# Patient Record
Sex: FEMALE | Race: WHITE | Employment: UNEMPLOYED | URBAN - METROPOLITAN AREA
[De-identification: names, ages, dates, MRNs, and addresses within clinical notes are randomized per-mention and may not be internally consistent; named-entity substitution may affect disease eponyms.]

---

## 2017-02-28 DIAGNOSIS — M79.7 FIBROMYALGIA: ICD-10-CM

## 2017-03-02 RX ORDER — PREGABALIN 75 MG/1
CAPSULE ORAL
Qty: 60 CAP | Refills: 3 | OUTPATIENT
Start: 2017-03-02

## 2017-03-02 RX ORDER — TIZANIDINE 4 MG/1
TABLET ORAL
Qty: 30 TAB | Refills: 2 | Status: SHIPPED | OUTPATIENT
Start: 2017-03-02 | End: 2017-06-02 | Stop reason: SDUPTHER

## 2017-03-03 ENCOUNTER — TELEPHONE (OUTPATIENT)
Dept: FAMILY MEDICINE CLINIC | Age: 62
End: 2017-03-03

## 2017-03-03 RX ORDER — PREGABALIN 75 MG/1
75 CAPSULE ORAL 2 TIMES DAILY
Qty: 60 CAP | Refills: 0 | Status: SHIPPED | OUTPATIENT
Start: 2017-03-03 | End: 2017-03-08 | Stop reason: SDUPTHER

## 2017-03-03 NOTE — TELEPHONE ENCOUNTER
Refilling Lyrica for 1 month. Needs to have an appointment in the next few days but has run out and needs her medication for the weekend.

## 2017-03-08 ENCOUNTER — OFFICE VISIT (OUTPATIENT)
Dept: FAMILY MEDICINE CLINIC | Age: 62
End: 2017-03-08

## 2017-03-08 VITALS
BODY MASS INDEX: 23.7 KG/M2 | HEART RATE: 85 BPM | HEIGHT: 62 IN | SYSTOLIC BLOOD PRESSURE: 129 MMHG | OXYGEN SATURATION: 97 % | DIASTOLIC BLOOD PRESSURE: 75 MMHG | TEMPERATURE: 98 F | RESPIRATION RATE: 18 BRPM | WEIGHT: 128.8 LBS

## 2017-03-08 DIAGNOSIS — M54.50 CHRONIC BILATERAL LOW BACK PAIN WITHOUT SCIATICA: ICD-10-CM

## 2017-03-08 DIAGNOSIS — Z23 ENCOUNTER FOR IMMUNIZATION: ICD-10-CM

## 2017-03-08 DIAGNOSIS — M79.7 FIBROMYALGIA: Primary | ICD-10-CM

## 2017-03-08 DIAGNOSIS — F41.9 ANXIETY: ICD-10-CM

## 2017-03-08 DIAGNOSIS — G89.29 CHRONIC BILATERAL LOW BACK PAIN WITHOUT SCIATICA: ICD-10-CM

## 2017-03-08 DIAGNOSIS — M50.90 CERVICAL NECK PAIN WITH EVIDENCE OF DISC DISEASE: ICD-10-CM

## 2017-03-08 RX ORDER — PREGABALIN 75 MG/1
75 CAPSULE ORAL 2 TIMES DAILY
Qty: 60 CAP | Refills: 2 | Status: SHIPPED | OUTPATIENT
Start: 2017-03-08 | End: 2017-06-28 | Stop reason: SDUPTHER

## 2017-03-08 NOTE — PROGRESS NOTES
.  Chief Complaint   Patient presents with    Fibromyalgia     . [facility]

## 2017-03-08 NOTE — PROGRESS NOTES
GINI  Vargas Marquez is a 64 y.o. female who presents to follow-up on fibromyalgia. Since I saw her last she has seen GI and been diagnosed with H pylori. Had triple therapy and will need to go back to test for clearance. She is following up for 3 month visit for Lyrica. She finds that Lyrica works well for her. It helps her pain which she describes as \"everywhere\". Today complaining of back, shoulders, insteps, thighs. I had referred her to both physical therapy and a counselor at the last visit but things just piled up and she never got around to it. She got a fit bit for Neema from her . Her record so far is 9000 steps but she was laid up for the next 2 days. Her normal is around 2-3000    PMHx:  Past Medical History:   Diagnosis Date    Gastrointestinal disorder     proctitis    Other ill-defined conditions(799.89)     hereniated disc neck & lower spine    Other ill-defined conditions(799.89)     fibromyalgia    Other ill-defined conditions(799.89)     pleurisy       Meds:   Current Outpatient Prescriptions   Medication Sig Dispense Refill    pregabalin (LYRICA) 75 mg capsule Take 1 Cap by mouth two (2) times a day. Max Daily Amount: 150 mg. 60 Cap 2    tiZANidine (ZANAFLEX) 4 mg tablet TAKE 1 TABLET BY MOUTH THREE TIMES DAILY AS NEEDED FOR FIBROMYALGLA SYMPTOMS 30 Tab 2    metoprolol succinate (TOPROL-XL) 25 mg XL tablet Take 1 Tab by mouth nightly. 30 Tab 6    ACETAMINOPHEN/DIPHENHYDRAMINE (TYLENOL PM PO) Take  by mouth.  acetaminophen (TYLENOL EXTRA STRENGTH) 500 mg tablet Take  by mouth every six (6) hours as needed for Pain.  traZODone (DESYREL) 100 mg tablet Take 1 Tab by mouth nightly. 30 Tab 5       Allergies:    Allergies   Allergen Reactions    Ciprofloxacin Other (comments)    Naproxen Unknown (comments)     Bleeding in stomach         Smoker:  History   Smoking Status    Current Every Day Smoker    Packs/day: 1.00    Types: Cigarettes    Last attempt to quit: 9/10/2010   Smokeless Tobacco    Never Used       ETOH:   History   Alcohol Use    1.5 oz/week    3 Glasses of wine per week       FH:   Family History   Problem Relation Age of Onset    Heart Disease Mother     Heart Surgery Mother     Liver Disease Father     Other Sister      neuroma    Other Other      muscular dystrophy    Coronary Artery Disease Maternal Grandfather     Heart Attack Maternal Grandfather        ROS:  As listed in HPI. In addition:  Constitutional:   No headache, fever, fatigue, weight loss or weight gain        Cardiac:    No chest pain      Resp:   No cough or shortness of breath      Neuro   No loss of consciousness, dizziness, seizures      Physical Exam:  Blood pressure 129/75, pulse 85, temperature 98 °F (36.7 °C), resp. rate 18, height 5' 2\" (1.575 m), weight 128 lb 12.8 oz (58.4 kg), SpO2 97 %. GEN: No apparent distress. Alert and oriented and responds to all questions appropriately. NECK:  Supple; no masses; thyroid normal           LUNGS: Respirations unlabored; clear to auscultation bilaterally  CARDIOVASCULAR: Regular rate and rhythm without murmurs   ABDOMEN: Soft; nontender; nondistended; normoactive bowel sounds; no masses or organomegaly  NEUROLOGIC:  No focal neurologic deficits. Strength and sensation grossly intact. Coordination and gait grossly intact. EXT: Well perfused. No edema. SKIN: No obvious rashes. Assessment and Plan     Fibromyalgia  Doing a little better since last time we talked because she is getting up more. As evidence by her Fit Bit she is not really moving around that much, 2-3000 steps is typical for her even on days where she is trying to move around more    Has some anxiety and actually asked me if she was depressed at the last visit. See that note for more social history. Has not gotten involved counselor yet. I strongly encourage the physical therapy. I think that [facility]   While there get involved with a counselor for her fibromyalgia. Refer to [facility] physical therapy for fibromyalgia, chronic low back pain, cervical pain      ICD-10-CM ICD-9-CM    1. Fibromyalgia M79.7 729.1 pregabalin (LYRICA) 75 mg capsule      REFERRAL TO PHYSICAL THERAPY   2. Anxiety F41.9 300.00    3. Chronic bilateral low back pain without sciatica M54.5 724.2 REFERRAL TO PHYSICAL THERAPY    G89.29 338.29    4. Cervical neck pain with evidence of disc disease M50.90 722.91 REFERRAL TO PHYSICAL THERAPY       AVS given.  Pt expressed understanding of instructions

## 2017-03-08 NOTE — PATIENT INSTRUCTIONS
Vaccine Information Statement     Tdap (Tetanus, Diphtheria, Pertussis) Vaccine: What You Need to Know    Many Vaccine Information Statements are available in Azeri and other languages. See www.immunize.org/vis. Hojas de Información Sobre Vacunas están disponibles en español y en muchos otros idiomas. Visite Kobe[employer].si    1. Why get vaccinated? Tetanus, diphtheria, and pertussis are very serious diseases. Tdap vaccine can protect us from these diseases. And, Tdap vaccine given to pregnant women can protect  babies against pertussis. TETANUS (Lockjaw) is rare in the [facility] today. It causes painful muscle tightening and stiffness, usually all over the body.  It can lead to tightening of muscles in the head and neck so you cant open your mouth, swallow, or sometimes even breathe. Tetanus kills about 1 out of 10 people who are infected even after receiving the best medical care. DIPHTHERIA is also rare in the [facility] today. It can cause a thick coating to form in the back of the throat.  It can lead to breathing problems, heart failure, paralysis, and death. PERTUSSIS (Whooping Cough) causes severe coughing spells, which can cause difficulty breathing, vomiting, and disturbed sleep.  It can also lead to weight loss, incontinence, and rib fractures. Up to 2 in 100 adolescents and 5 in 100 adults with pertussis are hospitalized or have complications, which could include pneumonia or death. These diseases are caused by bacteria. Diphtheria and pertussis are spread from person to person through secretions from coughing or sneezing. Tetanus enters the body through cuts, scratches, or wounds. Before vaccines, as many as 200,000 cases of diphtheria, 200,000 cases of pertussis, and hundreds of cases of tetanus, were reported in the [place] each year.  Since vaccination began, reports of cases for tetanus and diphtheria have dropped by about 99% and for pertussis by about 80%. 2. Tdap vaccine    Tdap vaccine can protect adolescents and adults from tetanus, diphtheria, and pertussis. One dose of Tdap is routinely given at age 6 or 15. People who did not get Tdap at that age should get it as soon as possible. Tdap is especially important for health care professionals and anyone having close contact with a baby younger than 12 months. Pregnant women should get a dose of Tdap during every pregnancy, to protect the  from pertussis. Infants are most at risk for severe, life-threatening complications from pertussis. Another vaccine, called Td, protects against tetanus and diphtheria, but not pertussis. A Td booster should be given every 10 years. Tdap may be given as one of these boosters if you have never gotten Tdap before. Tdap may also be given after a severe cut or burn to prevent tetanus infection. Your doctor or the person giving you the vaccine can give you more information. Tdap may safely be given at the same time as other vaccines. 3. Some people should not get this vaccine     A person who has ever had a life-threatening allergic reaction after a previous dose of any diphtheria, tetanus or pertussis containing vaccine, OR has a severe allergy to any part of this vaccine, should not get Tdap vaccine. Tell the person giving the vaccine about any severe allergies.  Anyone who had coma or long repeated seizures within 7 days after a childhood dose of DTP or DTaP, or a previous dose of Tdap, should not get Tdap, unless a cause other than the vaccine was found. They can still get Td.  Talk to your doctor if you:  - have seizures or another nervous system problem,  - had severe pain or swelling after any vaccine containing diphtheria, tetanus or pertussis,   - ever had a condition called Guillain Barré Syndrome (GBS),  - arent feeling well on the day the shot is scheduled.     4. Risks    With any medicine, including vaccines, there is a chance of side effects. These are usually mild and go away on their own. Serious reactions are also possible but are rare. Most people who get Tdap vaccine do not have any problems with it. Mild Problems following Tdap  (Did not interfere with activities)   Pain where the shot was given (about 3 in 4 adolescents or 2 in 3 adults)   Redness or swelling where the shot was given (about 1 person in 5)   Mild fever of at least 100.4°F (up to about 1 in 25 adolescents or 1 in 100 adults)   Headache (about 3 or 4 people in 10)   Tiredness (about 1 person in 3 or 4)   Nausea, vomiting, diarrhea, stomach ache (up to 1 in 4 adolescents or 1 in 10 adults)   Chills,  sore joints (about 1 person in 10)   Body aches (about 1 person in 3 or 4)    Rash, swollen glands (uncommon)    Moderate Problems following Tdap  (Interfered with activities, but did not require medical attention)   Pain where the shot was given (up to 1 in 5 or 6)    Redness or swelling where the shot was given (up to about 1 in 16 adolescents or 1 in 12 adults)   Fever over 102°F (about 1 in 100 adolescents or 1 in 250 adults)   Headache (about 1 in 7 adolescents or 1 in 10 adults)   Nausea, vomiting, diarrhea, stomach ache (up to 1 or 3 people in 100)   Swelling of the entire arm where the shot was given (up to about 1 in 500). Severe Problems following Tdap  (Unable to perform usual activities; required medical attention)   Swelling, severe pain, bleeding, and redness in the arm where the shot was given (rare). Problems that could happen after any vaccine:     People sometimes faint after a medical procedure, including vaccination. Sitting or lying down for about 15 minutes can help prevent fainting, and injuries caused by a fall. Tell your doctor if you feel dizzy, or have vision changes or ringing in the ears.      Some people get severe pain in the shoulder and have difficulty moving the arm where a shot was given. This happens very rarely.  Any medication can cause a severe allergic reaction. Such reactions from a vaccine are very rare, estimated at fewer than 1 in a million doses, and would happen within a few minutes to a few hours after the vaccination. As with any medicine, there is a very remote chance of a vaccine causing a serious injury or death. The safety of vaccines is always being monitored. For more information, visit: www.cdc.gov/vaccinesafety/    5. What if there is a serious problem? What should I look for?  Look for anything that concerns you, such as signs of a severe allergic reaction, very high fever, or unusual behavior.  Signs of a severe allergic reaction can include hives, swelling of the face and throat, difficulty breathing, a fast heartbeat, dizziness, and weakness. These would usually start a few minutes to a few hours after the vaccination. What should I do?  If you think it is a severe allergic reaction or other emergency that cant wait, call 9-1-1 or get the person to the nearest hospital. Otherwise, call your doctor.  Afterward, the reaction should be reported to the Vaccine Adverse Event Reporting System (VAERS). Your doctor might file this report, or you can do it yourself through the VAERS web site at www.vaers. [facility].gov, or by calling 6-107.892.6918. [employer] does not give medical advice. 6. The National Vaccine Injury Compensation Program    The [facility] Vaccine Injury Compensation Program (VICP) is a federal program that was created to compensate people who may have been injured by certain vaccines. Persons who believe they may have been injured by a vaccine can learn about the program and about filing a claim by calling 0-949.679.4505 or visiting the [employer] website at www.[facility].gov/vaccinecompensation. There is a time limit to file a claim for compensation. 7. How can I learn more?  Ask your doctor.  He or she can give you the vaccine package insert or suggest other sources of information.  Call your local or state health department.  Contact the Centers for Disease Control and Prevention (CDC):  - Call 4-492.326.7086 (1-800-CDC-INFO) or  - Visit CDCs website at www.cdc.gov/vaccines      Vaccine Information Statement   Tdap Vaccine  (2/24/2015)  [address]ANA Roberson [id number]    Department of Health and Human Services  Centers for Disease Control and Prevention    Office Use Only

## 2017-03-08 NOTE — PROGRESS NOTES
Garett Salazar is a 64 y.o. female who presents for Tdap immunization. She denies any symptoms , reactions or allergies that would exclude them from being immunized today. Risks and adverse reactions were discussed and the VIS was given to them. All questions were addressed. She was observed for 10 min post injection. There were no reactions observed.     Patrice Noble

## 2017-03-21 ENCOUNTER — OFFICE VISIT (OUTPATIENT)
Dept: CARDIOLOGY CLINIC | Age: 62
End: 2017-03-21

## 2017-03-21 ENCOUNTER — DOCUMENTATION ONLY (OUTPATIENT)
Dept: CARDIOLOGY CLINIC | Age: 62
End: 2017-03-21

## 2017-03-21 VITALS
WEIGHT: 129.9 LBS | BODY MASS INDEX: 23.9 KG/M2 | DIASTOLIC BLOOD PRESSURE: 70 MMHG | RESPIRATION RATE: 16 BRPM | SYSTOLIC BLOOD PRESSURE: 120 MMHG | OXYGEN SATURATION: 97 % | HEIGHT: 62 IN | HEART RATE: 74 BPM

## 2017-03-21 DIAGNOSIS — I83.813 VARICOSE VEINS OF BOTH LOWER EXTREMITIES WITH PAIN: Primary | ICD-10-CM

## 2017-03-21 DIAGNOSIS — I83.893 VARICOSE VEINS OF BOTH LEGS WITH EDEMA: ICD-10-CM

## 2017-03-21 DIAGNOSIS — I87.2 VENOUS INSUFFICIENCY OF BOTH LOWER EXTREMITIES: ICD-10-CM

## 2017-03-21 NOTE — PROGRESS NOTES
Chief Complaint   Patient presents with    Other     vascular consult-3 month-tried to use compression stockings but it hurt more,trying to exercise more, legs still hurt

## 2017-03-21 NOTE — PROGRESS NOTES
3/21/2017 1:56 PM      Subjective:     Shivam Millan is here for f/u visit. Continues to c/o b/l LE discomfort, swelling despite conservative management. Visit Vitals    /70 (BP 1 Location: Right arm, BP Patient Position: Sitting)    Pulse 74    Resp 16    Ht 5' 2\" (1.575 m)    Wt 129 lb 14.4 oz (58.9 kg)    SpO2 97%    BMI 23.76 kg/m2       Current Outpatient Prescriptions   Medication Sig    pregabalin (LYRICA) 75 mg capsule Take 1 Cap by mouth two (2) times a day. Max Daily Amount: 150 mg.  tiZANidine (ZANAFLEX) 4 mg tablet TAKE 1 TABLET BY MOUTH THREE TIMES DAILY AS NEEDED FOR FIBROMYALGLA SYMPTOMS    metoprolol succinate (TOPROL-XL) 25 mg XL tablet Take 1 Tab by mouth nightly.  ACETAMINOPHEN/DIPHENHYDRAMINE (TYLENOL PM PO) Take  by mouth.  acetaminophen (TYLENOL EXTRA STRENGTH) 500 mg tablet Take  by mouth every six (6) hours as needed for Pain.  traZODone (DESYREL) 100 mg tablet Take 1 Tab by mouth nightly. No current facility-administered medications for this visit.           Objective:      Visit Vitals    /70 (BP 1 Location: Right arm, BP Patient Position: Sitting)    Pulse 74    Resp 16    Ht 5' 2\" (1.575 m)    Wt 129 lb 14.4 oz (58.9 kg)    SpO2 97%    BMI 23.76 kg/m2       Data Review:     Reviewed and/or ordered active problem list, medication list tests    Past Medical History:   Diagnosis Date    Gastrointestinal disorder     proctitis    Other ill-defined conditions(799.89)     hereniated disc neck & lower spine    Other ill-defined conditions(799.89)     fibromyalgia    Other ill-defined conditions(799.89)     pleurisy      Past Surgical History:   Procedure Laterality Date    BREAST SURGERY PROCEDURE UNLISTED      breast cancer    HX GYN  [id number],6575    c sections    HX HYSTERECTOMY      HX ORTHOPAEDIC      left foot sx    HX OTHER SURGICAL       Allergies   Allergen Reactions    Ciprofloxacin Other (comments)  Naproxen Unknown (comments)     Bleeding in stomach        Family History   Problem Relation Age of Onset    Heart Disease Mother     Heart Surgery Mother     Liver Disease Father     Other Sister      neuroma    Other Other      muscular dystrophy    Coronary Artery Disease Maternal Grandfather     Heart Attack Maternal Grandfather       Social History     Social History    Marital status:      Spouse name: N/A    Number of children: N/A    Years of education: N/A     Occupational History    Not on file. Social History Main Topics    Smoking status: Current Every Day Smoker     Packs/day: 1.00     Types: Cigarettes     Last attempt to quit: 9/10/2010    Smokeless tobacco: Never Used    Alcohol use 1.5 oz/week     3 Glasses of wine per week    Drug use: No    Sexual activity: Not on file     Other Topics Concern    Not on file     Social History Narrative          Review of Systems     General: Not Present- Anorexia, Chills, Dietary Changes, Fatigue, Fever, Medication Changes, Night Sweats, Weight Gain > 10lbs. and Weight Loss > 10lbs. .  Skin: Not Present- Bruising and Excessive Sweating. HEENT: Not Present- Headache, Visual Loss and Vertigo. Respiratory: Not Present- Cough, Decreased Exercise Tolerance, Difficulty Breathing, Snoring and Wheezing. Cardiovascular: Not Present- Abnormal Blood Pressure, Chest Pain, Difficulty Breathing On Exertion, Fainting / Blacking Out, Irregular Heart Beat, Orthopnea, Palpitations, Paroxysmal Nocturnal Dyspnea, Rapid Heart Rate, Shortness of Breath. Gastrointestinal: Not Present- Black, Tarry Stool, Bloody Stool, Diarrhea, Hematemesis, Rectal Bleeding and Vomiting. Musculoskeletal: Not Present- Muscle Pain and Muscle Weakness. Neurological: Not Present- Dizziness. Psychiatric: Not Present- Depression. Endocrine: Not Present- Cold Intolerance, Heat Intolerance and Thyroid Problems.   Hematology: Not Present- Abnormal Bleeding, Anemia, Blood Clots and Easy Bruising. Physical Exam   The physical exam findings are as follows:     General   Mental Status - Alert. General Appearance - Cooperative and Well groomed. Not in acute distress. Orientation - Oriented to time, Oriented to place and Oriented to person. Build & Nutrition - Well developed. Skin   General: - Normal.      HEENT  Head - Normal.  Eye - Normal.  Mouth & Throat - Normal.      Neck   Carotid Arteries - normal upstroke. No Bruits. Thyroid: Gland - Normal size and consistency. Chest and Lung Exam   Inspection:   Chest Wall: - Normal. Accessory muscles - No use of accessory muscles in breathing. Auscultation:   Breath sounds: - Normal.      Cardiovascular   Inspection: Jugular vein - Bilateral - Inspection Normal.  Palpation/Percussion:   Apical Impulse: - Normal.  Auscultation: Rhythm - Regular. Heart Sounds - S1 WNL and S2 WNL. No S3 or S4. Murmurs & Other Heart Sounds: Auscultation of the heart reveals - No Murmurs. Abdomen   Palpation/Percussion: Palpation and Percussion of the abdomen reveal - No Palpable abdominal masses. Liver: - Normal.  Spleen: - Normal.  Auscultation: Auscultation of the abdomen reveals - Bowel sounds normal.      Neurologic   Mental Status: Affect - normal.  Motor: - Normal. Gait - Normal.      PHYSICIAN TO COMPLETE    Date of Physician Reevaluation:__________12/6/2016_____________  (To review results of trial of conservative therapy-lasting at least 3-6 months):  Patient is symptomatic with varicosities despite compliance with conservative therapy. Has failed conservative treatment. Check all that apply:  [x] Other causes of patients leg(s) symptoms have been ruled out  [x] Completed conservative treatment to include: compression stockings, medication, leg elevation, mild exercise & weight         reduction (as appropriate).  Time length of conservative treatment[de-identified] ______3 months________    Patient is symptomatic with varicosities causing the following: (check all that apply):  [x] Has persistent aching, cramping, burning, pain, itching, and/or swelling during activity or after prolonged standing. [] Significant, recurrent superficial phlebitis  [] Hemorrhage from a ruptured varix  [] Non-healing skin ulceration of the leg  [] Other complications associated:  ___________________      Duplex or Doppler Ultrasound of the venous system demonstrate:  [x] Absence of deep venous thrombosis  [x] Greater and/or lesser saphenous vein or  valvular incompetence/reflux that correlates with        patients symptoms  []   valvular incompetence/reflux that correlates with patients symptoms         Assessment:       ICD-10-CM ICD-9-CM    1. Varicose veins of both lower extremities with pain I83.813 454.8    2. Varicose veins of both legs with edema I83.893 454.8    3. Venous insufficiency of both lower extremities I87.2 459.81        Plan:     1. B/l LE varicose veins, swelling and edema: remains symptomatic despite 3 months trial of compression stocking 20-30 mmHg, thigh high, daily leg elevation, mild exercise. Symptoms worse than before. Recommend RF ablation of right GSV, left GSV and left SSV. Will get preauth. D/w pt.       Belinda Colby MD  3/21/2017  1:56 PM

## 2017-03-27 ENCOUNTER — TELEPHONE (OUTPATIENT)
Dept: FAMILY MEDICINE CLINIC | Age: 62
End: 2017-03-27

## 2017-06-02 DIAGNOSIS — M79.7 FIBROMYALGIA: ICD-10-CM

## 2017-06-05 RX ORDER — TIZANIDINE 4 MG/1
TABLET ORAL
Qty: 30 TAB | Refills: 2 | Status: SHIPPED | OUTPATIENT
Start: 2017-06-05 | End: 2017-09-05 | Stop reason: SDUPTHER

## 2017-06-28 ENCOUNTER — OFFICE VISIT (OUTPATIENT)
Dept: FAMILY MEDICINE CLINIC | Age: 62
End: 2017-06-28

## 2017-06-28 VITALS
SYSTOLIC BLOOD PRESSURE: 116 MMHG | RESPIRATION RATE: 14 BRPM | HEART RATE: 74 BPM | TEMPERATURE: 98 F | WEIGHT: 130 LBS | OXYGEN SATURATION: 96 % | BODY MASS INDEX: 23.92 KG/M2 | DIASTOLIC BLOOD PRESSURE: 73 MMHG | HEIGHT: 62 IN

## 2017-06-28 DIAGNOSIS — G47.00 INSOMNIA, UNSPECIFIED TYPE: Primary | ICD-10-CM

## 2017-06-28 DIAGNOSIS — M79.7 FIBROMYALGIA: ICD-10-CM

## 2017-06-28 RX ORDER — PREGABALIN 75 MG/1
75 CAPSULE ORAL 2 TIMES DAILY
Qty: 60 CAP | Refills: 2 | Status: SHIPPED | OUTPATIENT
Start: 2017-06-28 | End: 2017-09-08 | Stop reason: SDUPTHER

## 2017-06-28 NOTE — PROGRESS NOTES
GINI  Charles Sharpe is a 58 y.o. female who presents to follow-up on fibromyalgia. It has been 3 months since I have seen her. She is following every 3 months for Lyrica refill. She says the Lyrica is working well for her. Helps her pain. On days that she does not take it she really notices a difference. She also has Zanaflex which she only takes at night. Was taking it up to 3 times a day at one point but was getting tired. [facility]. She is going and seeing a therapist once a week. She was also doing physical therapy once or twice a week for about a month but stopped doing this for a number of reasons. 1 of the reasons is that she kind of lost [place] in the physical therapy because she was not seeing much improvement. The other reason is that her daughter had complications related to pregnancy. The baby is fine but daughter ended up back in the hospital with an infection after . This was back in April. Everything is fine now. She has continued this excuse for a month longer than she needed to. [place] noting that she handled this stressful time pretty well from an emotional standpoint    Still using her fit bit, still averaging about 2500 steps. Has done 8000 steps the last few days but is really sore. Achieve this much because she was chasing after the grandkids    PMHx:  Past Medical History:   Diagnosis Date    Gastrointestinal disorder     proctitis    Other ill-defined conditions     hereniated disc neck & lower spine    Other ill-defined conditions     fibromyalgia    Other ill-defined conditions     pleurisy       Meds:   Current Outpatient Prescriptions   Medication Sig Dispense Refill    pregabalin (LYRICA) 75 mg capsule Take 1 Cap by mouth two (2) times a day.  Max Daily Amount: 150 mg. 60 Cap 2    tiZANidine (ZANAFLEX) 4 mg tablet TAKE 1 TABLET BY MOUTH THREE TIMES DAILY AS NEEDED FOR FIBROMYALGIA SYMPTOMS 30 Tab 2    traZODone (DESYREL) 100 mg tablet TAKE 1 TABLET BY MOUTH NIGHTLY 30 Tab 11    metoprolol succinate (TOPROL-XL) 25 mg XL tablet Take 1 Tab by mouth nightly. 30 Tab 6    ACETAMINOPHEN/DIPHENHYDRAMINE (TYLENOL PM PO) Take  by mouth.  acetaminophen (TYLENOL EXTRA STRENGTH) 500 mg tablet Take  by mouth every six (6) hours as needed for Pain. Allergies: Allergies   Allergen Reactions    Ciprofloxacin Other (comments)    Naproxen Unknown (comments)     Bleeding in stomach         Smoker:  History   Smoking Status    Current Every Day Smoker    Packs/day: 1.00    Types: Cigarettes    Last attempt to quit: 9/10/2010   Smokeless Tobacco    Never Used       ETOH:   History   Alcohol Use    1.5 oz/week    3 Glasses of wine per week       FH:   Family History   Problem Relation Age of Onset    Heart Disease Mother     Heart Surgery Mother     Liver Disease Father     Other Sister      neuroma    Other Other      muscular dystrophy    Coronary Artery Disease Maternal Grandfather     Heart Attack Maternal Grandfather        ROS:   As listed in HPI. In addition:  Constitutional:   No headache, fever, fatigue, weight loss or weight gain      Cardiac:    No chest pain      Resp:   No cough or shortness of breath      Neuro   No loss of consciousness, dizziness, seizures      Physical Exam:  Blood pressure 116/73, pulse 74, temperature 98 °F (36.7 °C), resp. rate 14, height 5' 2\" (1.575 m), weight 130 lb (59 kg), SpO2 96 %. GEN: No apparent distress. Alert and oriented and responds to all questions appropriately. NEUROLOGIC:  No focal neurologic deficits. Strength and sensation grossly intact. Coordination and gait grossly intact. EXT: Well perfused. No edema. SKIN: No obvious rashes.          Assessment and Plan     Fibromyalgia  Continue Zanaflex, Lyrica  Get back in with physical therapy, expect recovery to be a slow process  Continue with your psychologist  Spent most of the visit today giving her a pep talk    Trazodone 100 mg working well for sleep    Has plans to get varicose veins stripped soon    She quit smoking 3 weeks ago! Using the patch to quit.  is starting to have some breathing trouble which has worried her. Has a couple health maintenance points. It has been a while since she has had a physical.  I drew labs in October for a sick visit that looked fine. Is going to need cholesterol at some point. If we have time that her follow-up in 3 months we will go ahead and do a physical      ICD-10-CM ICD-9-CM    1. Fibromyalgia M79.7 729.1 pregabalin (LYRICA) 75 mg capsule       AVS given.  Pt expressed understanding of instructions

## 2017-09-05 DIAGNOSIS — M79.7 FIBROMYALGIA: ICD-10-CM

## 2017-09-05 RX ORDER — TIZANIDINE 4 MG/1
TABLET ORAL
Qty: 30 TAB | Refills: 2 | Status: SHIPPED | OUTPATIENT
Start: 2017-09-05 | End: 2017-11-27 | Stop reason: SDUPTHER

## 2017-09-08 ENCOUNTER — OFFICE VISIT (OUTPATIENT)
Dept: FAMILY MEDICINE CLINIC | Age: 62
End: 2017-09-08

## 2017-09-08 VITALS
TEMPERATURE: 98.2 F | HEART RATE: 73 BPM | SYSTOLIC BLOOD PRESSURE: 142 MMHG | BODY MASS INDEX: 24.11 KG/M2 | DIASTOLIC BLOOD PRESSURE: 80 MMHG | RESPIRATION RATE: 12 BRPM | WEIGHT: 131 LBS | OXYGEN SATURATION: 96 % | HEIGHT: 62 IN

## 2017-09-08 DIAGNOSIS — Z23 ENCOUNTER FOR IMMUNIZATION: ICD-10-CM

## 2017-09-08 DIAGNOSIS — G89.29 CHRONIC BILATERAL LOW BACK PAIN WITHOUT SCIATICA: Primary | ICD-10-CM

## 2017-09-08 DIAGNOSIS — M54.50 CHRONIC BILATERAL LOW BACK PAIN WITHOUT SCIATICA: Primary | ICD-10-CM

## 2017-09-08 DIAGNOSIS — F41.9 ANXIETY: ICD-10-CM

## 2017-09-08 DIAGNOSIS — M79.7 FIBROMYALGIA: ICD-10-CM

## 2017-09-08 RX ORDER — PREGABALIN 75 MG/1
75 CAPSULE ORAL 2 TIMES DAILY
Qty: 60 CAP | Refills: 2 | Status: SHIPPED | OUTPATIENT
Start: 2017-09-08 | End: 2017-12-29 | Stop reason: SDUPTHER

## 2017-09-08 NOTE — PROGRESS NOTES
Reg Cesar is a 58 y.o. female who presents for Influenza immunizations. She denies any symptoms , reactions or allergies that would exclude them from being immunized today. Risks and adverse reactions were discussed and the VIS was given to them. All questions were addressed. She was observed for 10 min post injection. There were no reactions observed.     Pacheco Yañez

## 2017-09-08 NOTE — MR AVS SNAPSHOT
Visit Information Date & Time Provider Department Dept. Phone Encounter #  
 9/8/2017  3:00 PM Bry Guerrero MD [address] [facility] [phone] [id number] Upcoming Health Maintenance Date Due Hepatitis C Screening 1955 Pneumococcal 19-64 Medium Risk (1 of 1 - PPSV23) 6/3/1974 PAP AKA CERVICAL CYTOLOGY 6/3/1976 BREAST CANCER SCRN MAMMOGRAM 6/3/2005 ZOSTER VACCINE AGE 60> 4/3/2015 INFLUENZA AGE 9 TO ADULT 8/1/2017 COLONOSCOPY 1/11/2021 DTaP/Tdap/Td series (2 - Td) 3/8/2027 Allergies as of 9/8/2017  Review Complete On: 6/28/2017 By: Bry Guerrero MD  
  
 Severity Noted Reaction Type Reactions Ciprofloxacin  07/23/2010    Other (comments) Naproxen  07/23/2010    Unknown (comments) Bleeding in stomach Current Immunizations  Reviewed on 9/8/2017 Name Date Influenza Vaccine Geralddemetris Gonzalez) 12/8/2014 Influenza Vaccine (Quad) PF 9/8/2017 Influenza Vaccine Split 12/13/2011 Tdap 3/8/2017 Reviewed by Simeon Mccarthy on 9/8/2017 at  3:55 PM  
 Reviewed by Simeon Mccarthy on 9/8/2017 at  3:55 PM  
You Were Diagnosed With   
  
 Codes Comments Chronic bilateral low back pain without sciatica    -  Primary ICD-10-CM: M54.5, G89.29 ICD-9-CM: 724.2, 338.29 Fibromyalgia     ICD-10-CM: M79.7 ICD-9-CM: 729.1 Anxiety     ICD-10-CM: F41.9 ICD-9-CM: 300.00 Encounter for immunization     ICD-10-CM: F93 ICD-9-CM: V03.89 Vitals BP Pulse Temp Resp Height(growth percentile) Weight(growth percentile) 142/80 (BP 1 Location: Left arm, BP Patient Position: Sitting) 73 98.2 °F (36.8 °C) 12 5' 2\" (1.575 m) 131 lb (59.4 kg) SpO2 BMI OB Status Smoking Status 96% 23.96 kg/m2 Hysterectomy Current Every Day Smoker BMI and BSA Data Body Mass Index Body Surface Area  
 23.96 kg/m 2 1.61 m 2 Preferred Pharmacy Pharmacy Name Phone [address] [phone] Your Updated Medication List  
  
   
This list is accurate as of: 9/8/17  3:55 PM.  Always use your most recent med list.  
  
  
  
  
 metoprolol succinate 25 mg XL tablet Commonly known as:  TOPROL-XL Take 1 Tab by mouth nightly. pregabalin 75 mg capsule Commonly known as:  Stanislav Echevaria Take 1 Cap by mouth two (2) times a day. Max Daily Amount: 150 mg.  
  
 tiZANidine 4 mg tablet Commonly known as:  Ashlyn Stairs TAKE 1 TABLET BY MOUTH THREE TIMES DAILY AS NEEDED FOR FIBROMYALGIA SYMPTOMS  
  
 traZODone 100 mg tablet Commonly known as:  DESYREL  
TAKE 1 TABLET BY MOUTH NIGHTLY  
  
 TYLENOL EXTRA STRENGTH 500 mg tablet Generic drug:  acetaminophen Take  by mouth every six (6) hours as needed for Pain. TYLENOL PM PO Take  by mouth. Prescriptions Printed Refills  
 pregabalin (LYRICA) 75 mg capsule 2 Sig: Take 1 Cap by mouth two (2) times a day. Max Daily Amount: 150 mg.  
 Class: Print Route: Oral  
  
We Performed the Following INFLUENZA VIRUS VAC QUAD,SPLIT,PRESV FREE SYRINGE IM [id number] CPT(R)] TX IMMUNIZ ADMIN,1 SINGLE/COMB VAC/TOXOID [id number] CPT(R)] Patient Instructions Vaccine Information Statement Influenza (Flu) Vaccine (Inactivated or Recombinant): What you need to know Many Vaccine Information Statements are available in Tam[place] and other languages. See www.immunize.org/vis Hojas de Información Sobre Vacunas están disponibles en Español y en muchos otros idiomas. Visite www.immunize.org/vis 1. Why get vaccinated? Influenza (flu) is a contagious disease that spreads around the [place] every year, usually between October and May. Flu is caused by influenza viruses, and is spread mainly by coughing, sneezing, and close contact. Anyone can get flu. Flu strikes suddenly and can last several days. Symptoms vary by age, but can include: 
 fever/chills  sore throat  muscle aches  fatigue  cough  headache  runny or stuffy nose Flu can also lead to pneumonia and blood infections, and cause diarrhea and seizures in children. If you have a medical condition, such as heart or lung disease, flu can make it worse. Flu is more dangerous for some people. Infants and young children, people 72years of age and older, pregnant women, and people with certain health conditions or a weakened immune system are at greatest risk. Each year thousands of people in the [facility] die from flu, and many more are hospitalized. Flu vaccine can: 
 keep you from getting flu, 
 make flu less severe if you do get it, and 
 keep you from spreading flu to your family and other people. 2. Inactivated and recombinant flu vaccines A dose of flu vaccine is recommended every flu season. Children 6 months through 6years of age may need two doses during the same flu season. Everyone else needs only one dose each flu season. Some inactivated flu vaccines contain a very small amount of a mercury-based preservative called thimerosal. Studies have not shown thimerosal in vaccines to be harmful, but flu vaccines that do not contain thimerosal are available. There is no live flu virus in flu shots. They cannot cause the flu. There are many flu viruses, and they are always changing. Each year a new flu vaccine is made to protect against three or four viruses that are likely to cause disease in the upcoming flu season. But even when the vaccine doesnt exactly match these viruses, it may still provide some protection Flu vaccine cannot prevent: 
 flu that is caused by a virus not covered by the vaccine, or 
 illnesses that look like flu but are not. It takes about 2 weeks for protection to develop after vaccination, and protection lasts through the flu season. 3. Some people should not get this vaccine Tell the person who is giving you the vaccine:  If you have any severe, life-threatening allergies. If you ever had a life-threatening allergic reaction after a dose of flu vaccine, or have a severe allergy to any part of this vaccine, you may be advised not to get vaccinated. Most, but not all, types of flu vaccine contain a small amount of egg protein.  If you ever had Guillain-Barré Syndrome (also called GBS). Some people with a history of GBS should not get this vaccine. This should be discussed with your doctor.  If you are not feeling well. It is usually okay to get flu vaccine when you have a mild illness, but you might be asked to come back when you feel better. 4. Risks of a vaccine reaction With any medicine, including vaccines, there is a chance of reactions. These are usually mild and go away on their own, but serious reactions are also possible. Most people who get a flu shot do not have any problems with it. Minor problems following a flu shot include:  
 soreness, redness, or swelling where the shot was given  hoarseness  sore, red or itchy eyes  cough  fever  aches  headache  itching  fatigue If these problems occur, they usually begin soon after the shot and last 1 or 2 days. More serious problems following a flu shot can include the following:  There may be a small increased risk of Guillain-Barré Syndrome (GBS) after inactivated flu vaccine. This risk has been estimated at 1 or 2 additional cases per million people vaccinated. This is much lower than the risk of severe complications from flu, which can be prevented by flu vaccine.  Young children who get the flu shot along with pneumococcal vaccine (PCV13) and/or DTaP vaccine at the same time might be slightly more likely to have a seizure caused by fever. Ask your doctor for more information. Tell your doctor if a child who is getting flu vaccine has ever had a seizure. Problems that could happen after any injected vaccine:  People sometimes faint after a medical procedure, including vaccination. Sitting or lying down for about 15 minutes can help prevent fainting, and injuries caused by a fall. Tell your doctor if you feel dizzy, or have vision changes or ringing in the ears.  Some people get severe pain in the shoulder and have difficulty moving the arm where a shot was given. This happens very rarely.  Any medication can cause a severe allergic reaction. Such reactions from a vaccine are very rare, estimated at about 1 in a million doses, and would happen within a few minutes to a few hours after the vaccination. As with any medicine, there is a very remote chance of a vaccine causing a serious injury or death. The safety of vaccines is always being monitored. For more information, visit: www.cdc.gov/vaccinesafety/ 
 
 
6. The National Vaccine Injury [place] 
 
 The [employer] Injury Compensation Program (VICP) is a federal program that was created to compensate people who may have been injured by certain vaccines. Persons who believe they may have been injured by a vaccine can learn about the program and about filing a claim by calling 5-683.516.2633 or visiting the [employer] website at www.[facility].gov/vaccinecompensation. There is a time limit to file a claim for compensation. 7. How can I learn more?  Ask your healthcare provider. He or she can give you the vaccine package insert or suggest other sources of information.  Call your local or state health department.  Contact the Centers for Disease Control and Prevention (CDC): 
- Call 9-863.557.9122 (8-331-XUA-INFO) or 
- Visit CDCs website at www.cdc.gov/flu Vaccine Information Statement Inactivated Influenza Vaccine 8/7/2015 
[address] [id number] Department of [facility] and [email or web address] Centers for Disease Control and Prevention Office Use Only [facility]! Dear Ham Bravo: Thank you for requesting a [employer]op[employer] account. Our records indicate that you have previously registered for a Greenopedia account but its currently inactive. Please call our Greenopedia support line at [phone]. Additional Information If you have questions, please visit the Frequently Asked Questions section of the [employer] website at [email or web address] Remember, MyChart is NOT to be used for urgent needs. For medical emergencies, dial 911. Now available from your iPhone and Android! Please provide this summary of care documentation to your next provider. Your primary care clinician is listed as Suzie Bull. If you have any questions after today's visit, please call [phone].

## 2017-09-08 NOTE — PATIENT INSTRUCTIONS
Vaccine Information Statement    Influenza (Flu) Vaccine (Inactivated or Recombinant): What you need to know    Many Vaccine Information Statements are available in [place] and other languages. See www.immunize.org/vis  Hojas de Información Sobre Vacunas están disponibles en Español y en muchos otros idiomas. Visite www.immunize.org/vis    1. Why get vaccinated? Influenza (flu) is a contagious disease that spreads around the [place] every year, usually between October and May. Flu is caused by influenza viruses, and is spread mainly by coughing, sneezing, and close contact. Anyone can get flu. Flu strikes suddenly and can last several days. Symptoms vary by age, but can include:   fever/chills   sore throat   muscle aches   fatigue   cough   headache    runny or stuffy nose    Flu can also lead to pneumonia and blood infections, and cause diarrhea and seizures in children. If you have a medical condition, such as heart or lung disease, flu can make it worse. Flu is more dangerous for some people. Infants and young children, people 72years of age and older, pregnant women, and people with certain health conditions or a weakened immune system are at greatest risk. Each year thousands of people in the [facility] die from flu, and many more are hospitalized. Flu vaccine can:   keep you from getting flu,   make flu less severe if you do get it, and   keep you from spreading flu to your family and other people. 2. Inactivated and recombinant flu vaccines    A dose of flu vaccine is recommended every flu season. Children 6 months through 6years of age may need two doses during the same flu season. Everyone else needs only one dose each flu season.        Some inactivated flu vaccines contain a very small amount of a mercury-based preservative called thimerosal. Studies have not shown thimerosal in vaccines to be harmful, but flu vaccines that do not contain thimerosal are available. There is no live flu virus in flu shots. They cannot cause the flu. There are many flu viruses, and they are always changing. Each year a new flu vaccine is made to protect against three or four viruses that are likely to cause disease in the upcoming flu season. But even when the vaccine doesnt exactly match these viruses, it may still provide some protection    Flu vaccine cannot prevent:   flu that is caused by a virus not covered by the vaccine, or   illnesses that look like flu but are not. It takes about 2 weeks for protection to develop after vaccination, and protection lasts through the flu season. 3. Some people should not get this vaccine    Tell the person who is giving you the vaccine:     If you have any severe, life-threatening allergies. If you ever had a life-threatening allergic reaction after a dose of flu vaccine, or have a severe allergy to any part of this vaccine, you may be advised not to get vaccinated. Most, but not all, types of flu vaccine contain a small amount of egg protein.  If you ever had Guillain-Barré Syndrome (also called GBS). Some people with a history of GBS should not get this vaccine. This should be discussed with your doctor.  If you are not feeling well. It is usually okay to get flu vaccine when you have a mild illness, but you might be asked to come back when you feel better. 4. Risks of a vaccine reaction    With any medicine, including vaccines, there is a chance of reactions. These are usually mild and go away on their own, but serious reactions are also possible. Most people who get a flu shot do not have any problems with it.      Minor problems following a flu shot include:    soreness, redness, or swelling where the shot was given     hoarseness   sore, red or itchy eyes   cough   fever   aches   headache   itching   fatigue  If these problems occur, they usually begin soon after the shot and last 1 or 2 days. More serious problems following a flu shot can include the following:     There may be a small increased risk of Guillain-Barré Syndrome (GBS) after inactivated flu vaccine. This risk has been estimated at 1 or 2 additional cases per million people vaccinated. This is much lower than the risk of severe complications from flu, which can be prevented by flu vaccine.  Young children who get the flu shot along with pneumococcal vaccine (PCV13) and/or DTaP vaccine at the same time might be slightly more likely to have a seizure caused by fever. Ask your doctor for more information. Tell your doctor if a child who is getting flu vaccine has ever had a seizure. Problems that could happen after any injected vaccine:      People sometimes faint after a medical procedure, including vaccination. Sitting or lying down for about 15 minutes can help prevent fainting, and injuries caused by a fall. Tell your doctor if you feel dizzy, or have vision changes or ringing in the ears.  Some people get severe pain in the shoulder and have difficulty moving the arm where a shot was given. This happens very rarely.  Any medication can cause a severe allergic reaction. Such reactions from a vaccine are very rare, estimated at about 1 in a million doses, and would happen within a few minutes to a few hours after the vaccination. As with any medicine, there is a very remote chance of a vaccine causing a serious injury or death. The safety of vaccines is always being monitored. For more information, visit: www.cdc.gov/vaccinesafety/    5. What if there is a serious reaction? What should I look for?  Look for anything that concerns you, such as signs of a severe allergic reaction, very high fever, or unusual behavior.     Signs of a severe allergic reaction can include hives, swelling of the face and throat, difficulty breathing, a fast heartbeat, dizziness, and weakness - usually within a few minutes to a few hours after the vaccination. What should I do?  If you think it is a severe allergic reaction or other emergency that cant wait, call 9-1-1 and get the person to the nearest hospital. Otherwise, call your doctor.  Reactions should be reported to the Vaccine Adverse Event Reporting System (VAERS). Your doctor should file this report, or you can do it yourself through  the VAERS web site at www.vaers. [facility].gov, or by calling 1-279.718.9538. VAERS does not give medical advice. 6. The National Vaccine Injury Compensation Program    The [facility] Vaccine Injury Compensation Program (VICP) is a federal program that was created to compensate people who may have been injured by certain vaccines. Persons who believe they may have been injured by a vaccine can learn about the program and about filing a claim by calling 6-664.474.7416 or visiting the [employer] website at www.[facility].gov/vaccinecompensation. There is a time limit to file a claim for compensation. 7. How can I learn more?  Ask your healthcare provider. He or she can give you the vaccine package insert or suggest other sources of information.  Call your local or state health department.  Contact the Centers for Disease Control and Prevention (CDC):  - Call 4-786.384.3536 (1-800-CDC-INFO) or  - Visit CDCs website at www.cdc.gov/flu    Vaccine Information Statement   Inactivated Influenza Vaccine   8/7/2015  42 UMaddy Staton Pop [id number]    Department of Health and Human Services  Centers for Disease Control and Prevention    Office Use Only

## 2017-09-08 NOTE — PROGRESS NOTES
.  Chief Complaint   Patient presents with    Medication Evaluation    Immunization/Injection     flu     . Starlette Bolkyraese

## 2017-11-27 DIAGNOSIS — M79.7 FIBROMYALGIA: ICD-10-CM

## 2017-11-27 RX ORDER — TIZANIDINE 4 MG/1
TABLET ORAL
Qty: 30 TAB | Refills: 2 | Status: SHIPPED | OUTPATIENT
Start: 2017-11-27 | End: 2018-03-26 | Stop reason: SDUPTHER

## 2017-12-29 ENCOUNTER — OFFICE VISIT (OUTPATIENT)
Dept: FAMILY MEDICINE CLINIC | Age: 62
End: 2017-12-29

## 2017-12-29 VITALS
SYSTOLIC BLOOD PRESSURE: 112 MMHG | TEMPERATURE: 97.8 F | OXYGEN SATURATION: 98 % | BODY MASS INDEX: 25.17 KG/M2 | RESPIRATION RATE: 16 BRPM | DIASTOLIC BLOOD PRESSURE: 72 MMHG | HEART RATE: 68 BPM | WEIGHT: 136.8 LBS | HEIGHT: 62 IN

## 2017-12-29 DIAGNOSIS — E55.9 VITAMIN D DEFICIENCY: ICD-10-CM

## 2017-12-29 DIAGNOSIS — Z01.89 ENCOUNTER FOR ROUTINE LABORATORY TESTING: ICD-10-CM

## 2017-12-29 DIAGNOSIS — Z11.59 ENCOUNTER FOR HEPATITIS C SCREENING TEST FOR LOW RISK PATIENT: ICD-10-CM

## 2017-12-29 DIAGNOSIS — Z23 ENCOUNTER FOR IMMUNIZATION: ICD-10-CM

## 2017-12-29 DIAGNOSIS — Z13.220 SCREENING FOR LIPID DISORDERS: ICD-10-CM

## 2017-12-29 DIAGNOSIS — M79.7 FIBROMYALGIA: Primary | ICD-10-CM

## 2017-12-29 DIAGNOSIS — Z13.29 SCREENING FOR THYROID DISORDER: ICD-10-CM

## 2017-12-29 RX ORDER — PREGABALIN 75 MG/1
75 CAPSULE ORAL 2 TIMES DAILY
Qty: 60 CAP | Refills: 2 | Status: CANCELLED | OUTPATIENT
Start: 2017-12-29

## 2017-12-29 RX ORDER — PREGABALIN 75 MG/1
75 CAPSULE ORAL 2 TIMES DAILY
Qty: 60 CAP | Refills: 3 | Status: SHIPPED | OUTPATIENT
Start: 2017-12-29 | End: 2018-04-26 | Stop reason: SDUPTHER

## 2017-12-29 NOTE — PROGRESS NOTES
Chief Complaint   Patient presents with    Medication Refill     Visit Vitals    /72 (BP 1 Location: Left arm, BP Patient Position: Sitting)    Pulse 68    Temp 97.8 °F (36.6 °C) (Oral)    Resp 16    Ht 5' 2\" (1.575 m)    Wt 136 lb 12.8 oz (62.1 kg)    SpO2 98%    BMI 25.02 kg/m2     1. Have you been to the ER, urgent care clinic since your last visit? Hospitalized since your last visit? No    2. Have you seen or consulted any other health care providers outside of the [address] [place] [address] since your last visit? Include any pap smears or colon screening. Saniya Kaye Alma is a 58 y.o. female who presents for routine immunizations for pneumovax 23. She denies any symptoms , reactions or allergies that would exclude them from being immunized today. Risks and adverse reactions were discussed and the VIS was given to them. All questions were addressed. She was observed for10 min post injection. There were no reactions observed.     Goldy Boykin LPN

## 2017-12-29 NOTE — PATIENT INSTRUCTIONS
Fibromyalgia: Care Instructions  Your Care Instructions    Fibromyalgia is a painful condition that is not completely understood by medical experts. The cause of fibromyalgia is not known. It can make you feel tired and ache all over. It causes tender spots at specific points of the body that hurt only when you press on them. You may have trouble sleeping, as well as other symptoms. These problems can upset your work and home life. Symptoms tend to come and go, although they may never go away completely. Fibromyalgia does not harm your muscles, joints, or organs. Follow-up care is a key part of your treatment and safety. Be sure to make and go to all appointments, and call your doctor if you are having problems. It's also a good idea to know your test results and keep a list of the medicines you take. How can you care for yourself at home? · Exercise often. Walk, swim, or bike to help with pain and sleep problems and to make you feel better. · Try to get a good night's sleep. Go to bed and get up at the same time each day, whether you feel rested or not. Make sure you have a good mattress and pillow. · Reduce stress. Avoid things that cause you stress, if you can. If not, work at making them less stressful. Learn to use biofeedback, guided imagery, meditation, or other methods to relax. · Make healthy changes. Eat a balanced diet, quit smoking, and limit alcohol and caffeine. · Use a heating pad set on low or take warm baths or showers for pain. Using cold packs for up to 20 minutes at a time can also relieve pain. Put a thin cloth between the cold pack and your skin. A gentle massage might help too. · Be safe with medicines. Take your medicines exactly as prescribed. Call your doctor if you think you are having a problem with your medicine. Your doctor may talk to you about taking antidepressant medicines. These medicines may improve sleep, relieve pain, and in some cases treat depression.   · Learn about fibromyalgia. This makes coping easier. Then, take an active role in your treatment. · Think about joining a support group with others who have fibromyalgia to learn more and get support. When should you call for help? Watch closely for changes in your health, and be sure to contact your doctor if:  ? · You feel sad, helpless, or hopeless; lose interest in things you used to enjoy; or have other symptoms of depression. ? · Your fibromyalgia symptoms get worse. Where can you learn more? Go to http://yadira-shira.info/. Enter V003 in the search box to learn more about \"Fibromyalgia: Care Instructions. \"  Current as of: October 14, 2016  Content Version: 11.4  © 4789-7190 [employer]. Care instructions adapted under license by [employer] (which disclaims liability or warranty for this information). If you have questions about a medical condition or this instruction, always ask your healthcare professional. [address] any warranty or liability for your use of this information. Learning About Benefits From Quitting Smoking  How does quitting smoking make you healthier? If you're thinking about quitting smoking, you may have a few reasons to be smoke-free. Your health may be one of them. · When you quit smoking, you lower your risks for cancer, lung disease, heart attack, stroke, blood vessel disease, and blindness from macular degeneration. · When you're smoke-free, you get sick less often, and you heal faster. You are less likely to get colds, flu, bronchitis, and pneumonia. · As a nonsmoker, you may find that your mood is better and you are less stressed. When and how will you feel healthier? Quitting has real health benefits that start from day 1 of being smoke-free. And the longer you stay smoke-free, the healthier you get and the better you feel.   The first hours  · After just 20 minutes, your blood pressure and heart rate go down. That means there's less stress on your heart and blood vessels. · Within 12 hours, the level of carbon monoxide in your blood drops back to normal. That makes room for more oxygen. With more oxygen in your body, you may notice that you have more energy than when you smoked. After 2 weeks  · Your lungs start to work better. · Your risk of heart attack starts to drop. After 1 month  · When your lungs are clear, you cough less and breathe deeper, so it's easier to be active. · Your sense of taste and smell return. That means you can enjoy food more than you have since you started smoking. Over the years  · After 1 year, your risk of heart disease is half what it would be if you kept smoking. · After 5 years, your risk of stroke starts to shrink. Within a few years after that, it's about the same as if you'd never smoked. · After 10 years, your risk of dying from lung cancer is cut by about half. And your risk for many other types of cancer is lower too. How would quitting help others in your life? When you quit smoking, you improve the health of everyone who now breathes in your smoke. · Their heart, lung, and cancer risks drop, much like yours. · They are sick less. For babies and small children, living smoke-free means they're less likely to have ear infections, pneumonia, and bronchitis. · If you're a woman who is or will be pregnant someday, quitting smoking means a healthier . · Children who are close to you are less likely to become adult smokers. Where can you learn more? Go to http://aydira-shira.info/. Enter [id number] [id number] [id number] in the search box to learn more about \"Learning About Benefits From Quitting Smoking. \"  Current as of: 2017  Content Version: 11.4  © 7297-9323 [employer]. Care instructions adapted under license by [employer] (which disclaims liability or warranty for this information).  If you have questions about a medical condition or this instruction, always ask your healthcare professional. [address] any warranty or liability for your use of this information. Vaccine Information Statement    Pneumococcal Polysaccharide Vaccine: What You Need to Know    Many Vaccine Information Statements are available in [place] and other languages. See www.immunize.org/vis. Hojas de información Sobre Vacunas están disponibles en español y en muchos otros idiomas. Visite KobeScjackie.si. 1. Why get vaccinated? Vaccination can protect older adults (and some children and younger adults) from pneumococcal disease. Pneumococcal disease is caused by bacteria that can spread from person to person through close contact. It can cause ear infections, and it can also lead to more serious infections of the:   Lungs (pneumonia),   Blood (bacteremia), and   Covering of the brain and spinal cord (meningitis). Meningitis can cause deafness and brain damage, and it can be fatal.      Anyone can get pneumococcal disease, but children under 3years of age, people with certain medical conditions, adults over 72years of age, and cigarette smokers are at the highest risk. About 18,000 older adults die each year from pneumococcal disease in the [place]. Treatment of pneumococcal infections with penicillin and other drugs used to be more effective. But some strains of the disease have become resistant to these drugs. This makes prevention of the disease, through vaccination, even more important. 2. Pneumococcal polysaccharide vaccine (PPSV23)    Pneumococcal polysaccharide vaccine (PPSV23) protects against 23 types of pneumococcal bacteria. It will not prevent all pneumococcal disease.     PPSV23 is recommended for:   All adults 72years of age and older,   Anyone 2 through 59years of age with certain long-term health problems,   Anyone 2 through 59years of age with a weakened immune system,   Adults 23 through 59years of age who smoke cigarettes or have asthma. Most people need only one dose of PPSV. A second dose is recommended for certain high-risk groups. People 72 and older should get a dose even if they have gotten one or more doses of the vaccine before they turned 65. Your healthcare provider can give you more information about these recommendations. Most healthy adults develop protection within 2 to 3 weeks of getting the shot. 3. Some people should not get this vaccine     Anyone who has had a life-threatening allergic reaction to PPSV should not get another dose.  Anyone who has a severe allergy to any component of PPSV should not receive it. Tell your provider if you have any severe allergies.  Anyone who is moderately or severely ill when the shot is scheduled may be asked to wait until they recover before getting the vaccine. Someone with a mild illness can usually be vaccinated.  Children less than 3years of age should not receive this vaccine.  There is no evidence that PPSV is harmful to either a pregnant woman or to her fetus. However, as a precaution, women who need the vaccine should be vaccinated before becoming pregnant, if possible. 4. Risks of a vaccine reaction    With any medicine, including vaccines, there is a chance of side effects. These are usually mild and go away on their own, but serious reactions are also possible. About half of people who get PPSV have mild side effects, such as redness or pain where the shot is given, which go away within about two days. Less than 1 out of 100 people develop a fever, muscle aches, or more severe local reactions. Problems that could happen after any vaccine:     People sometimes faint after a medical procedure, including vaccination. Sitting or lying down for about 15 minutes can help prevent fainting, and injuries caused by a fall.  Tell your doctor if you feel dizzy, or have vision changes or ringing in the ears.  Some people get severe pain in the shoulder and have difficulty moving the arm where a shot was given. This happens very rarely.  Any medication can cause a severe allergic reaction. Such reactions from a vaccine are very rare, estimated at about 1 in a million doses, and would happen within a few minutes to a few hours after the vaccination. As with any medicine, there is a very remote chance of a vaccine causing a serious injury or death. The safety of vaccines is always being monitored. For more information, visit: www.cdc.gov/vaccinesafety/     5. What if there is a serious reaction? What should I look for? Look for anything that concerns you, such as signs of a severe allergic reaction, very high fever, or unusual behavior. Signs of a severe allergic reaction can include hives, swelling of the face and throat, difficulty breathing, a fast heartbeat, dizziness, and weakness. These would usually start a few minutes to a few hours after the vaccination. What should I do? If you think it is a severe allergic reaction or other emergency that cant wait, call 9-1-1 or get to the nearest hospital. Otherwise, call your doctor. Afterward, the reaction should be reported to the Vaccine Adverse Event Reporting System (VAERS). Your doctor might file this report, or you can do it yourself through the VAERS web site at www.vaers. hhs.gov, or by calling 4-719.185.3181. VAERS does not give medical advice. 6. How can I learn more?  Ask your doctor. He or she can give you the vaccine package insert or suggest other sources of information.  Call your local or state health department.    Contact the Centers for Disease Control and Prevention (CDC):  - Call 4-238.793.3455 (1-800-CDC-INFO) or  - Visit CDCs website at [employer] 18 04/24/2015    [facility] for Disease Control and Prevention    Office Use Only

## 2017-12-29 NOTE — MR AVS SNAPSHOT
Visit Information Date & Time Provider Department Dept. Phone Encounter #  
 12/29/2017  9:30 AM Brittnee Howard NP Kip Caldwell [facility] 66 [phone] [id number] Follow-up Instructions Return in about 3 months (around 3/29/2018), or if symptoms worsen or fail to improve. Upcoming Health Maintenance Date Due Hepatitis C Screening 1955 Pneumococcal 19-64 Medium Risk (1 of 1 - PPSV23) 6/3/1974 ZOSTER VACCINE AGE 60> 4/3/2015 PAP AKA CERVICAL CYTOLOGY 12/29/2020 COLONOSCOPY 1/11/2021 DTaP/Tdap/Td series (2 - Td) 3/8/2027 Allergies as of 12/29/2017  Review Complete On: 12/29/2017 By: [id number], NP Severity Noted Reaction Type Reactions Ciprofloxacin  07/23/2010    Other (comments) Naproxen  07/23/2010    Unknown (comments) Bleeding in stomach Current Immunizations  Reviewed on 9/8/2017 Name Date Influenza Vaccine [place]) 12/8/2014 Influenza Vaccine (Quad) PF 9/8/2017 Influenza Vaccine Split 12/13/2011 Pneumococcal Polysaccharide (PPSV-23)  Incomplete Tdap 3/8/2017 Not reviewed this visit You Were Diagnosed With   
  
 Codes Comments Fibromyalgia    -  Primary ICD-10-CM: M79.7 ICD-9-CM: 729.1 Vitamin D deficiency     ICD-10-CM: E55.9 ICD-9-CM: 268.9 Encounter for routine laboratory testing     ICD-10-CM: Z00.00 ICD-9-CM: V72.60 Screening for lipid disorders     ICD-10-CM: Z13.220 ICD-9-CM: V77.91 Screening for thyroid disorder     ICD-10-CM: Z13.29 ICD-9-CM: V77.0 Encounter for hepatitis C screening test for low risk patient     ICD-10-CM: Z11.59 
ICD-9-CM: V73.89 Encounter for immunization     ICD-10-CM: Z77 ICD-9-CM: V03.89 Vitals BP Pulse Temp Resp Height(growth percentile) Weight(growth percentile) 112/72 (BP 1 Location: Left arm, BP Patient Position: Sitting) 68 97.8 °F (36.6 °C) (Oral) 16 5' 2\" (1.575 m) 136 lb 12.8 oz (62.1 kg) SpO2 BMI OB Status Smoking Status 98% 25.02 kg/m2 Hysterectomy Current Every Day Smoker Vitals History BMI and BSA Data Body Mass Index Body Surface Area 25.02 kg/m 2 1.65 m 2 Preferred Pharmacy Pharmacy Name Phone [facility], [address] [phone] Your Updated Medication List  
  
   
This list is accurate as of: 12/29/17 10:09 AM.  Always use your most recent med list.  
  
  
  
  
 metoprolol succinate 25 mg XL tablet Commonly known as:  TOPROL-XL Take 1 Tab by mouth nightly. pregabalin 75 mg capsule Commonly known as:  Margarie Nate Take 1 Cap by mouth two (2) times a day. Max Daily Amount: 150 mg. Indications: FIBROMYALGIA  
  
 tiZANidine 4 mg tablet Commonly known as:  Laurence Manual TAKE 1 TABLET BY MOUTH THREE TIMES DAILY AS NEEDED FOR FIBROMYALGIA SYMPTOMS  
  
 traZODone 100 mg tablet Commonly known as:  DESYREL  
TAKE 1 TABLET BY MOUTH NIGHTLY  
  
 TYLENOL EXTRA STRENGTH 500 mg tablet Generic drug:  acetaminophen Take  by mouth every six (6) hours as needed for Pain. TYLENOL PM PO Take  by mouth. Prescriptions Printed Refills  
 pregabalin (LYRICA) 75 mg capsule 3 Sig: Take 1 Cap by mouth two (2) times a day. Max Daily Amount: 150 mg. Indications: FIBROMYALGIA Class: Print Route: Oral  
  
We Performed the Following CBC WITH AUTOMATED DIFF [23808 CPT(R)] HEPATITIS C AB [60717 CPT(R)] LIPID PANEL [61267 CPT(R)] METABOLIC PANEL, COMPREHENSIVE [17047 CPT(R)] PNEUMOCOCCAL POLYSACCHARIDE VACCINE, 23-VALENT, ADULT OR IMMUNOSUPPRESSED PT DOSE, [60565 CPT(R)] NM IMMUNIZ ADMIN,1 SINGLE/COMB VAC/TOXOID [id number] CPT(R)] TSH 3RD GENERATION [72549 CPT(R)] VITAMIN D, 25 HYDROXY [id number] CPT(R)] Follow-up Instructions Return in about 3 months (around 3/29/2018), or if symptoms worsen or fail to improve. Patient Instructions Fibromyalgia: Care Instructions Your Care Instructions Fibromyalgia is a painful condition that is not completely understood by medical experts. The cause of fibromyalgia is not known. It can make you feel tired and ache all over. It causes tender spots at specific points of the body that hurt only when you press on them. You may have trouble sleeping, as well as other symptoms. These problems can upset your work and home life. Symptoms tend to come and go, although they may never go away completely. Fibromyalgia does not harm your muscles, joints, or organs. Follow-up care is a key part of your treatment and safety. Be sure to make and go to all appointments, and call your doctor if you are having problems. It's also a good idea to know your test results and keep a list of the medicines you take. How can you care for yourself at home? · Exercise often. Walk, swim, or bike to help with pain and sleep problems and to make you feel better. · Try to get a good night's sleep. Go to bed and get up at the same time each day, whether you feel rested or not. Make sure you have a good mattress and pillow. · Reduce stress. Avoid things that cause you stress, if you can. If not, work at making them less stressful. Learn to use biofeedback, guided imagery, meditation, or other methods to relax. · Make healthy changes. Eat a balanced diet, quit smoking, and limit alcohol and caffeine. · Use a heating pad set on low or take warm baths or showers for pain. Using cold packs for up to 20 minutes at a time can also relieve pain. Put a thin cloth between the cold pack and your skin. A gentle massage might help too. · Be safe with medicines. Take your medicines exactly as prescribed. Call your doctor if you think you are having a problem with your medicine. Your doctor may talk to you about taking antidepressant medicines.  These medicines may improve sleep, relieve pain, and in some cases treat depression. · Learn about fibromyalgia. This makes coping easier. Then, take an active role in your treatment. · Think about joining a support group with others who have fibromyalgia to learn more and get support. When should you call for help? Watch closely for changes in your health, and be sure to contact your doctor if: 
? · You feel sad, helpless, or hopeless; lose interest in things you used to enjoy; or have other symptoms of depression. ? · Your fibromyalgia symptoms get worse. Where can you learn more? Go to http://yadira-shira.info/. Enter V003 in the search box to learn more about \"Fibromyalgia: Care Instructions. \" Current as of: October 14, 2016 Content Version: 11.4 © 4328-4797 [employer]. Care instructions adapted under license by [employer] (which disclaims liability or warranty for this information). If you have questions about a medical condition or this instruction, always ask your healthcare professional. [place] [address] any warranty or liability for your use of this information. Learning About Benefits From Quitting Smoking How does quitting smoking make you healthier? If you're thinking about quitting smoking, you may have a few reasons to be smoke-free. Your health may be one of them. · When you quit smoking, you lower your risks for cancer, lung disease, heart attack, stroke, blood vessel disease, and blindness from macular degeneration. · When you're smoke-free, you get sick less often, and you heal faster. You are less likely to get colds, flu, bronchitis, and pneumonia. · As a nonsmoker, you may find that your mood is better and you are less stressed. When and how will you feel healthier? Quitting has real health benefits that start from day 1 of being smoke-free. And the longer you stay smoke-free, the healthier you get and the better you feel. The first hours · After just 20 minutes, your blood pressure and heart rate go down. That means there's less stress on your heart and blood vessels. · Within 12 hours, the level of carbon monoxide in your blood drops back to normal. That makes room for more oxygen. With more oxygen in your body, you may notice that you have more energy than when you smoked. After 2 weeks · Your lungs start to work better. · Your risk of heart attack starts to drop. After 1 month · When your lungs are clear, you cough less and breathe deeper, so it's easier to be active. · Your sense of taste and smell return. That means you can enjoy food more than you have since you started smoking. Over the years · After 1 year, your risk of heart disease is half what it would be if you kept smoking. · After 5 years, your risk of stroke starts to shrink. Within a few years after that, it's about the same as if you'd never smoked. · After 10 years, your risk of dying from lung cancer is cut by about half. And your risk for many other types of cancer is lower too. How would quitting help others in your life? When you quit smoking, you improve the health of everyone who now breathes in your smoke. · Their heart, lung, and cancer risks drop, much like yours. · They are sick less. For babies and small children, living smoke-free means they're less likely to have ear infections, pneumonia, and bronchitis. · If you're a woman who is or will be pregnant someday, quitting smoking means a healthier . · Children who are close to you are less likely to become adult smokers. Where can you learn more? Go to http://yadira-shira.info/. Enter [id number] [id number] [id number] in the search box to learn more about \"Learning About Benefits From Quitting Smoking. \" Current as of: 2017 Content Version: 11.4 © 5786-4037 [employer].  Care instructions adapted under license by [employer] (which disclaims liability or warranty for this information). If you have questions about a medical condition or this instruction, always ask your healthcare professional. [address] any warranty or liability for your use of this information. Vaccine Information Statement Pneumococcal Polysaccharide Vaccine: What You Need to Know Many Vaccine Information Statements are available in [place]ian and other languages. See www.immunize.org/vis. Hojas de información Sobre Vacunas están disponibles en español y en muchos otros idiomas. Visite Aayush.si. 1. Why get vaccinated? Vaccination can protect older adults (and some children and younger adults) from pneumococcal disease. Pneumococcal disease is caused by bacteria that can spread from person to person through close contact. It can cause ear infections, and it can also lead to more serious infections of the: 
 Lungs (pneumonia),  Blood (bacteremia), and 
 Covering of the brain and spinal cord (meningitis). Meningitis can cause deafness and brain damage, and it can be fatal.   
 
Anyone can get pneumococcal disease, but children under 3years of age, people with certain medical conditions, adults over 72years of age, and cigarette smokers are at the highest risk. About 18,000 older adults die each year from pneumococcal disease in the [place]. Treatment of pneumococcal infections with penicillin and other drugs used to be more effective. But some strains of the disease have become resistant to these drugs. This makes prevention of the disease, through vaccination, even more important. 2. Pneumococcal polysaccharide vaccine (PPSV23) Pneumococcal polysaccharide vaccine (PPSV23) protects against 23 types of pneumococcal bacteria. It will not prevent all pneumococcal disease. PPSV23 is recommended for:  All adults 72years of age and older,  Anyone 2 through 59years of age with certain long-term health problems, 
  Anyone 2 through 59years of age with a weakened immune system, 
 Adults 23 through 59years of age who smoke cigarettes or have asthma. Most people need only one dose of PPSV. A second dose is recommended for certain high-risk groups. People 72 and older should get a dose even if they have gotten one or more doses of the vaccine before they turned 65. Your healthcare provider can give you more information about these recommendations. Most healthy adults develop protection within 2 to 3 weeks of getting the shot. 3. Some people should not get this vaccine  Anyone who has had a life-threatening allergic reaction to PPSV should not get another dose.  Anyone who has a severe allergy to any component of PPSV should not receive it. Tell your provider if you have any severe allergies.  Anyone who is moderately or severely ill when the shot is scheduled may be asked to wait until they recover before getting the vaccine. Someone with a mild illness can usually be vaccinated.  Children less than 3years of age should not receive this vaccine.  There is no evidence that PPSV is harmful to either a pregnant woman or to her fetus. However, as a precaution, women who need the vaccine should be vaccinated before becoming pregnant, if possible. 4. Risks of a vaccine reaction With any medicine, including vaccines, there is a chance of side effects. These are usually mild and go away on their own, but serious reactions are also possible. About half of people who get PPSV have mild side effects, such as redness or pain where the shot is given, which go away within about two days. Less than 1 out of 100 people develop a fever, muscle aches, or more severe local reactions. Problems that could happen after any vaccine:  People sometimes faint after a medical procedure, including vaccination.  Sitting or lying down for about 15 minutes can help prevent fainting, and injuries caused by a fall. Tell your doctor if you feel dizzy, or have vision changes or ringing in the ears.  Some people get severe pain in the shoulder and have difficulty moving the arm where a shot was given. This happens very rarely.  Any medication can cause a severe allergic reaction. Such reactions from a vaccine are very rare, estimated at about 1 in a million doses, and would happen within a few minutes to a few hours after the vaccination. As with any medicine, there is a very remote chance of a vaccine causing a serious injury or death. The safety of vaccines is always being monitored. For more information, visit: www.cdc.gov/vaccinesafety/  
 
5. What if there is a serious reaction? What should I look for? Look for anything that concerns you, such as signs of a severe allergic reaction, very high fever, or unusual behavior. Signs of a severe allergic reaction can include hives, swelling of the face and throat, difficulty breathing, a fast heartbeat, dizziness, and weakness. These would usually start a few minutes to a few hours after the vaccination. What should I do? If you think it is a severe allergic reaction or other emergency that cant wait, call 9-1-1 or get to the nearest hospital. Otherwise, call your doctor. Afterward, the reaction should be reported to the Vaccine Adverse Event Reporting System (VAERS). Your doctor might file this report, or you can do it yourself through the VAERS web site at www.vaers. hhs.gov, or by calling 4-990.718.5829. VAERS does not give medical advice. 6. How can I learn more?  Ask your doctor. He or she can give you the vaccine package insert or suggest other sources of information.  Call your local or state health department.  Contact the Centers for Disease Control and Prevention (CDC): 
- Call 4-504.459.9821 (1-800-CDC-INFO) or 
- Visit CDCs website at www.cdc.gov/vaccines Vaccine Information Statement PPSV  
04/24/2015 Department of Health and [employer] Centers for Disease Control and Prevention Office Use Only Introducing [facility] SERVICES! Dear Juan Francisco Roche: Thank you for requesting a [employer] account. Our records indicate that you have previously registered for a [employer] account but its currently inactive. Please call our [employer] support line at [phone]. Additional Information If you have questions, please visit the Frequently Asked Questions section of the [employer] website at [email or web address] Remember, [employer] is NOT to be used for urgent needs. For medical emergencies, dial 911. Now available from your iPhone and Android! Please provide this summary of care documentation to your next provider. Your primary care clinician is listed as Earnestine Kc. If you have any questions after today's visit, please call [phone].

## 2017-12-29 NOTE — PROGRESS NOTES
Subjective:     Chief Complaint   Patient presents with    Medication Refill        HPI:  Rosmery Odom is a 58 y.o. female here for routine follow-up on fibromyalgia. Taking lyrica 75mg BID \"for several years\", works well. Also takes Zanaflex \"usually just at night but if I have a bad day sometimes I will take it once during the day\". Says that she was on gabapentin in the past and \",nessed my memory up, I was confused about all kinds of things\". Does daily stretches. Has not been to PT for \"long time, never works out with my schedule\". Followed by GYN for routine women health  Followed by Dr Philippe Neal for Ulcerative Colitis, no flare ups in long time    No hospital, ER or specialist visits since last primary care visit except as noted above. Past Medical History:   Diagnosis Date    Gastrointestinal disorder     proctitis    Other ill-defined conditions(799.89)     hereniated disc neck & lower spine    Other ill-defined conditions(799.89)     fibromyalgia    Other ill-defined conditions(799.89)     pleurisy       Social History   Substance Use Topics    Smoking status: Current Every Day Smoker     Packs/day: 1.00     Types: Cigarettes     Last attempt to quit: 9/10/2010    Smokeless tobacco: Never Used    Alcohol use 1.5 oz/week     3 Glasses of wine per week       Outpatient Prescriptions Marked as Taking for the 12/29/17 encounter (Office Visit) with Trixie Riedel, NP   Medication Sig Dispense Refill    tiZANidine (ZANAFLEX) 4 mg tablet TAKE 1 TABLET BY MOUTH THREE TIMES DAILY AS NEEDED FOR FIBROMYALGIA SYMPTOMS 30 Tab 2    pregabalin (LYRICA) 75 mg capsule Take 1 Cap by mouth two (2) times a day. Max Daily Amount: 150 mg. 60 Cap 2    traZODone (DESYREL) 100 mg tablet TAKE 1 TABLET BY MOUTH NIGHTLY 30 Tab 11    metoprolol succinate (TOPROL-XL) 25 mg XL tablet Take 1 Tab by mouth nightly. 30 Tab 6    ACETAMINOPHEN/DIPHENHYDRAMINE (TYLENOL PM PO) Take  by mouth.       acetaminophen (TYLENOL EXTRA STRENGTH) 500 mg tablet Take  by mouth every six (6) hours as needed for Pain. Allergies   Allergen Reactions    Ciprofloxacin Other (comments)    Naproxen Unknown (comments)     Bleeding in stomach         Health Maintenance reviewed       ROS:  Gen: no fatigue, no fever, no chills, no unexplained weight loss or weight gain  Eyes: no excessive tearing, itching, or discharge  Nose: no rhinorrhea, no sinus pain  Mouth: no oral lesions, no sore throat, no difficulty swallowing  Resp: no shortness of breath, no wheezing, no cough  CV: no chest pain, no orthopnea, no paroxysmal nocturnal dyspnea, no lower extremity edema, no palpitations  Abd: no nausea, no heartburn, no diarrhea, no constipation, no abdominal pain  Neuro: no headaches, no syncope or presyncopal episodes  Endo: no polyuria, no polydipsia. : no hematuria, no dysuria, no frequency, no incontinence  Heme: no lymphadenopathy, no easy bruising or bleeding, no night sweats  MSK: no joint pain or swelling    PE:  Visit Vitals    /72 (BP 1 Location: Left arm, BP Patient Position: Sitting)    Pulse 68    Temp 97.8 °F (36.6 °C) (Oral)    Resp 16    Ht 5' 2\" (1.575 m)    Wt 136 lb 12.8 oz (62.1 kg)    SpO2 98%    BMI 25.02 kg/m2     Gen: alert, oriented, no acute distress  Head: normocephalic, atraumatic  Ears: external auditory canals clear, TMs without erythema or effusion  Eyes: pupils equal round reactive to light, sclera clear, conjunctiva clear  Oral: moist mucus membranes, no oral lesions, no pharyngeal inflammation or exudate  Neck: symmetric normal sized thyroid, no carotid bruits, no jugular vein distention  Resp: no increase work of breathing, lungs clear to ausculation bilaterally, no wheezing, rales or rhonchi  CV: S1, S2 normal.  No murmurs, rubs, or gallops. Abd: soft, not tender, not distended. No hepatosplenomegaly. Normal bowel sounds. No hernias. No abdominal or renal bruits.   Neuro: cranial nerves intact, normal strength and movement in all extremities, reflexes and sensation intact and symmetric. Skin: no lesion or rash  Extremities: no cyanosis or edema      Assessment/Plan:  Differential diagnosis and treatment options reviewed with patient who is in agreement with treatment plan as outlined below. ICD-10-CM ICD-9-CM    1. Fibromyalgia M79.7 729.1 pregabalin (LYRICA) 75 mg capsule   2. Vitamin D deficiency E55.9 268.9 VITAMIN D, 25 HYDROXY   3. Encounter for routine laboratory testing G65.21 T17.23 METABOLIC PANEL, COMPREHENSIVE      LIPID PANEL      TSH 3RD GENERATION      CBC WITH AUTOMATED DIFF      VITAMIN D, 25 HYDROXY   4. Screening for lipid disorders Z13.220 V77.91 LIPID PANEL   5. Screening for thyroid disorder Z13.29 V77.0 TSH 3RD GENERATION   6. Encounter for hepatitis C screening test for low risk patient Z11.59 V73.89 HEPATITIS C AB   7. Encounter for immunization Z23 V03.89 PNEUMOCOCCAL POLYSACCHARIDE VACCINE, 23-VALENT, ADULT OR IMMUNOSUPPRESSED PT DOSE,      SC IMMUNIZ ADMIN,1 SINGLE/COMB VAC/TOXOID     Routine labs today. She is fasting but had coffee with a little sugar. [facility]  reviewded and compliance noted. Refill provided  Pneumonia vaccine given, VIS discussed and copy given in AVS  Discussed BMI and healthy weight. Encouraged patient to work to implement changes including diet high in raw fruits and vegetables, lean protein and good fats. Limit refined, processed carbohydrates and sugar. Encouraged regular exercise. Recommended regular cardiovascular exercise 3-6 times per week for 30-60 minutes daily. I have discussed the diagnosis with the patient and the intended plan as seen in the above orders. The patient has received an after-visit summary and questions were answered concerning future plans. I have discussed medication side effects and warnings with the patient as well. The patient verbalizes understanding and agreement with the plan.     Follow up 3 months or sooner if needed

## 2017-12-30 LAB
25(OH)D3+25(OH)D2 SERPL-MCNC: 19.5 NG/ML (ref 30–100)
ALBUMIN SERPL-MCNC: 4.6 G/DL (ref 3.6–4.8)
ALBUMIN/GLOB SERPL: 1.9 {RATIO} (ref 1.2–2.2)
ALP SERPL-CCNC: 53 IU/L (ref 39–117)
ALT SERPL-CCNC: 15 IU/L (ref 0–32)
AST SERPL-CCNC: 20 IU/L (ref 0–40)
BASOPHILS # BLD AUTO: 0 X10E3/UL (ref 0–0.2)
BASOPHILS NFR BLD AUTO: 1 %
BILIRUB SERPL-MCNC: 0.4 MG/DL (ref 0–1.2)
BUN SERPL-MCNC: 16 MG/DL (ref 8–27)
BUN/CREAT SERPL: 25 (ref 12–28)
CALCIUM SERPL-MCNC: 9.6 MG/DL (ref 8.7–10.3)
CHLORIDE SERPL-SCNC: 102 MMOL/L (ref 96–106)
CHOLEST SERPL-MCNC: 187 MG/DL (ref 100–199)
CO2 SERPL-SCNC: 28 MMOL/L (ref 18–29)
CREAT SERPL-MCNC: 0.63 MG/DL (ref 0.57–1)
EOSINOPHIL # BLD AUTO: 0.1 X10E3/UL (ref 0–0.4)
EOSINOPHIL NFR BLD AUTO: 1 %
ERYTHROCYTE [DISTWIDTH] IN BLOOD BY AUTOMATED COUNT: 13.2 % (ref 12.3–15.4)
GLOBULIN SER CALC-MCNC: 2.4 G/DL (ref 1.5–4.5)
GLUCOSE SERPL-MCNC: 90 MG/DL (ref 65–99)
HCT VFR BLD AUTO: 37.3 % (ref 34–46.6)
HCV AB S/CO SERPL IA: 0.1 S/CO RATIO (ref 0–0.9)
HDLC SERPL-MCNC: 50 MG/DL
HGB BLD-MCNC: 12.5 G/DL (ref 11.1–15.9)
IMM GRANULOCYTES # BLD: 0 X10E3/UL (ref 0–0.1)
IMM GRANULOCYTES NFR BLD: 0 %
INTERPRETATION, 910389: NORMAL
LDLC SERPL CALC-MCNC: 113 MG/DL (ref 0–99)
LYMPHOCYTES # BLD AUTO: 2.2 X10E3/UL (ref 0.7–3.1)
LYMPHOCYTES NFR BLD AUTO: 40 %
MCH RBC QN AUTO: 31.6 PG (ref 26.6–33)
MCHC RBC AUTO-ENTMCNC: 33.5 G/DL (ref 31.5–35.7)
MCV RBC AUTO: 94 FL (ref 79–97)
MONOCYTES # BLD AUTO: 0.5 X10E3/UL (ref 0.1–0.9)
MONOCYTES NFR BLD AUTO: 9 %
NEUTROPHILS # BLD AUTO: 2.7 X10E3/UL (ref 1.4–7)
NEUTROPHILS NFR BLD AUTO: 49 %
PLATELET # BLD AUTO: 162 X10E3/UL (ref 150–379)
POTASSIUM SERPL-SCNC: 4.3 MMOL/L (ref 3.5–5.2)
PROT SERPL-MCNC: 7 G/DL (ref 6–8.5)
RBC # BLD AUTO: 3.95 X10E6/UL (ref 3.77–5.28)
SODIUM SERPL-SCNC: 142 MMOL/L (ref 134–144)
TRIGL SERPL-MCNC: 121 MG/DL (ref 0–149)
TSH SERPL DL<=0.005 MIU/L-ACNC: 1.77 UIU/ML (ref 0.45–4.5)
VLDLC SERPL CALC-MCNC: 24 MG/DL (ref 5–40)
WBC # BLD AUTO: 5.5 X10E3/UL (ref 3.4–10.8)

## 2018-01-02 DIAGNOSIS — E55.9 VITAMIN D DEFICIENCY: Primary | ICD-10-CM

## 2018-01-02 RX ORDER — ERGOCALCIFEROL 1.25 MG/1
50000 CAPSULE ORAL
Qty: 12 CAP | Refills: 0 | Status: SHIPPED | OUTPATIENT
Start: 2018-01-02 | End: 2019-05-13 | Stop reason: ALTCHOICE

## 2018-01-02 NOTE — PROGRESS NOTES
Labs are back. Vitamin d is low. She will need a prescription strength Vitamin D that she will take once per week for 12 weeks. When she completes these 12 weeks, she will then need to take a daily OTC vitamin d supplement of 2000 units per day. Rest of her labs look good, her total cholesterol is normal (improved since last year!) but her LDL is slightly elevated at 113. Continue to work on diet and exercise. Let me know if she has any questions.   Thanks   Avinash SUH

## 2018-02-09 RX ORDER — METOPROLOL SUCCINATE 25 MG/1
TABLET, EXTENDED RELEASE ORAL
Qty: 30 TAB | Refills: 6 | Status: SHIPPED | OUTPATIENT
Start: 2018-02-09 | End: 2019-04-02 | Stop reason: SDUPTHER

## 2018-03-02 NOTE — PROGRESS NOTES
Enid Cisneros is a 37 year old here today for a pregnancy test.  LMP: Patient's last menstrual period was 2017 (exact date).  Wt: 196 lbs 4 oz.    Symptoms include weight gain, breast tenderness, absence of menses, nausea &/or vomiting and fatigue.    Enid informed of positive pregnancy test results. BEVERLY: 18    Educational advice given: nutrition, smoking and drugs & alcohol.    Current medications reviewed: Yes    Previous pregnancy history remarkable for: none.    Plan: schedule appointment with OB Educator and/or OB class, follow-up appointment with JONO Alatorre for pre-amarjit care, take multivitamin or pre-amarjit vitamins and OB Education packet given.    She is to call back if she has any questions or concerns.  She is advised to notify a provider immediately if she experiences any severe cramping or abdominal pain or any vaginal bleeding.  Doretha Corbin, MILESN, RN     HPI  Leny Nelson is a 58 y.o. female who presents for follow-up on fibromyalgia. She has not been doing physical therapy over the summer because she has had a lot of other things going on. Feels like she has been getting up and doing a lot of exercise. Found a lot of benefit of the physical therapy and plans to get back in. Notices the barometric pressure changes and cold seem to bother her back and neck so is thinking about doing physical therapy going into the winter. Lyrica is working well    PMHx:  Past Medical History:   Diagnosis Date    Gastrointestinal disorder     proctitis    Other ill-defined conditions     hereniated disc neck & lower spine    Other ill-defined conditions     fibromyalgia    Other ill-defined conditions     pleurisy       Meds:   Current Outpatient Prescriptions   Medication Sig Dispense Refill    pregabalin (LYRICA) 75 mg capsule Take 1 Cap by mouth two (2) times a day. Max Daily Amount: 150 mg. 60 Cap 2    tiZANidine (ZANAFLEX) 4 mg tablet TAKE 1 TABLET BY MOUTH THREE TIMES DAILY AS NEEDED FOR FIBROMYALGIA SYMPTOMS 30 Tab 2    traZODone (DESYREL) 100 mg tablet TAKE 1 TABLET BY MOUTH NIGHTLY 30 Tab 11    metoprolol succinate (TOPROL-XL) 25 mg XL tablet Take 1 Tab by mouth nightly. 30 Tab 6    ACETAMINOPHEN/DIPHENHYDRAMINE (TYLENOL PM PO) Take  by mouth.  acetaminophen (TYLENOL EXTRA STRENGTH) 500 mg tablet Take  by mouth every six (6) hours as needed for Pain. Allergies:    Allergies   Allergen Reactions    Ciprofloxacin Other (comments)    Naproxen Unknown (comments)     Bleeding in stomach         Smoker:  History   Smoking Status    Current Every Day Smoker    Packs/day: 1.00    Types: Cigarettes    Last attempt to quit: 9/10/2010   Smokeless Tobacco    Never Used       ETOH:   History   Alcohol Use    1.5 oz/week    3 Glasses of wine per week       FH:   Family History   Problem Relation Age of Onset    Heart Disease Mother     Heart Surgery Mother     Liver Disease Father     Other Sister      neuroma    Other Other      muscular dystrophy    Coronary Artery Disease Maternal Grandfather     Heart Attack Maternal Grandfather        ROS:   As listed in HPI. In addition:  Constitutional:   No headache, fever, fatigue, weight loss or weight gain      Cardiac:    No chest pain      Resp:   No cough or shortness of breath      Neuro   No loss of consciousness, dizziness, seizures      Physical Exam:  Blood pressure 142/80, pulse 73, temperature 98.2 °F (36.8 °C), resp. rate 12, height 5' 2\" (1.575 m), weight 131 lb (59.4 kg), SpO2 96 %. GEN: No apparent distress. Alert and oriented and responds to all questions appropriately. NEUROLOGIC:  No focal neurologic deficits. Strength and sensation grossly intact. Coordination and gait grossly intact. EXT: Well perfused. No edema. SKIN: No obvious rashes. Assessment and Plan     Fibromyalgia  Having good days and bad days, today's a good day. Still having some low energy and achiness. Lyrica works well for her, takes 75 mg twice daily. Has been taking this for at least 6 years. We talked about adding Cymbalta today, she would like to think about it. Has been on Prozac in the past and tolerated this well when she had a death in the family. Follow-up 3 months on Lyrica  Reestablish physical therapy  Follow-up sooner if you would like to start Cymbalta    Has a number of health maintenance, gynecologist for women's health. Make appointment to see them. Wants to shelve the discussion of other health maintenance for now. Will plan to draw labs at 3 month follow-up      ICD-10-CM ICD-9-CM    1. Chronic bilateral low back pain without sciatica M54.5 724.2     G89.29 338.29    2. Fibromyalgia M79.7 729.1 pregabalin (LYRICA) 75 mg capsule   3. Anxiety F41.9 300.00    4.  Encounter for immunization Z23 V03.89 INFLUENZA VIRUS VAC QUAD,SPLIT,PRESV FREE SYRINGE IM      CO IMMUNIZ ADMIN,1 SINGLE/COMB VAC/TOXOID       AVS given.  Pt expressed understanding of instructions

## 2018-03-26 DIAGNOSIS — M79.7 FIBROMYALGIA: ICD-10-CM

## 2018-03-27 RX ORDER — TIZANIDINE 4 MG/1
TABLET ORAL
Qty: 30 TAB | Refills: 2 | Status: SHIPPED | OUTPATIENT
Start: 2018-03-27 | End: 2018-06-11 | Stop reason: SDUPTHER

## 2018-04-26 DIAGNOSIS — M79.7 FIBROMYALGIA: ICD-10-CM

## 2018-04-26 NOTE — TELEPHONE ENCOUNTER
Patient calling in refill. She says she needs it by Monday.  She has an appt on Monday with Dr Jeevan Jalloh at 1:20pm

## 2018-04-27 RX ORDER — PREGABALIN 75 MG/1
75 CAPSULE ORAL 2 TIMES DAILY
Qty: 60 CAP | Refills: 3 | Status: SHIPPED | OUTPATIENT
Start: 2018-04-27 | End: 2018-04-30 | Stop reason: SDUPTHER

## 2018-04-30 ENCOUNTER — OFFICE VISIT (OUTPATIENT)
Dept: FAMILY MEDICINE CLINIC | Age: 63
End: 2018-04-30

## 2018-04-30 VITALS
WEIGHT: 142 LBS | RESPIRATION RATE: 12 BRPM | OXYGEN SATURATION: 96 % | SYSTOLIC BLOOD PRESSURE: 129 MMHG | BODY MASS INDEX: 26.13 KG/M2 | HEART RATE: 79 BPM | HEIGHT: 62 IN | TEMPERATURE: 98.4 F | DIASTOLIC BLOOD PRESSURE: 78 MMHG

## 2018-04-30 DIAGNOSIS — Z12.39 BREAST CANCER SCREENING, HIGH RISK PATIENT: ICD-10-CM

## 2018-04-30 DIAGNOSIS — D22.9 CHANGING NEVUS: ICD-10-CM

## 2018-04-30 DIAGNOSIS — M24.852 SNAPPING HIP SYNDROME, LEFT: ICD-10-CM

## 2018-04-30 DIAGNOSIS — M79.7 FIBROMYALGIA: Primary | ICD-10-CM

## 2018-04-30 RX ORDER — PREGABALIN 75 MG/1
75 CAPSULE ORAL 2 TIMES DAILY
Qty: 60 CAP | Refills: 3 | Status: SHIPPED | OUTPATIENT
Start: 2018-04-30 | End: 2018-10-24 | Stop reason: SDUPTHER

## 2018-04-30 NOTE — PATIENT INSTRUCTIONS
Snapping Hip Syndrome: Exercises  Your Care Instructions  Here are some examples of typical rehabilitation exercises for your condition. Start each exercise slowly. Ease off the exercise if you start to have pain. Your doctor or physical therapist will tell you when you can start these exercises and which ones will work best for you. How to do the exercises  Iliotibial band stretch    1. Lean sideways against a wall. If you are not steady on your feet, hold on to a chair or counter. 2. Stand on the leg with the affected hip, with that leg close to the wall. Then cross your other leg in front of it. 3. Let your affected hip drop out to the side of your body and against the wall. Then lean away from your affected hip until you feel a stretch. 4. Hold the stretch for 15 to 30 seconds. 5. Repeat 2 to 4 times. Hip flexor stretch (kneeling)    1. Kneel on your affected leg, and bend your good leg out in front of you, with that foot flat on the floor. If you feel discomfort in the front of your knee, place a towel under your knee. 2. Keeping your back straight, slowly push your hips forward until you feel a stretch in the upper thigh of your back leg and hip. 3. Hold the stretch for at least 15 to 30 seconds. 4. Repeat 2 to 4 times. Piriformis stretch    1. Lie on your back with both knees bent and your feet flat on the floor. 2. Put the ankle of your affected leg on your opposite thigh near your knee. 3. Use your hands to gently lift the knee of your good leg off the floor. Gently pull that knee toward your chest until you feel a stretch in the buttock and hip of your affected leg. 4. Hold the stretch for at least 15 to 30 seconds. 5. Repeat 2 to 4 times. Hamstring stretch (lying down)    1. Lie flat on your back with your legs straight. If you feel discomfort in your back, place a small towel roll under your lower back.   2. Holding the back of your affected leg for support, lift that leg straight up and toward your body until you feel a stretch at the back of your thigh. 3. Hold the stretch for at least 30 seconds. 4. Repeat 2 to 4 times. Bridging    1. Lie on your back with both knees bent. Your knees should be bent about 90 degrees. 2. Then push your feet into the floor, squeeze your buttocks, and lift your hips off the floor until your shoulders, hips, and knees are all in a straight line. 3. Hold for about 6 seconds as you continue to breathe normally, and then slowly lower your hips back down to the floor and rest for up to 10 seconds. 4. Repeat 8 to 12 times. Clamshell    1. Lie on your side, with your affected leg on top and your head propped on a pillow. Keep your feet and knees together and your knees bent. 2. Raise your top knee, but keep your feet together. Do not let your hips roll back. Your legs should open up like a clamshell. 3. Hold for 6 seconds. 4. Slowly lower your knee back down. Rest for 10 seconds. 5. Repeat 8 to 12 times. Alternate arm and leg (bird dog) exercise    Do this exercise slowly. Try to keep your body straight at all times. 1. Start on the floor, on your hands and knees. 2. Tighten your belly muscles by pulling your belly button in toward your spine. Be sure you continue to breathe normally and do not hold your breath. 3. Raise one leg off the floor, and hold it straight out behind you. Be careful not to let your hip drop down, because that will twist your trunk. 4. Hold for about 6 seconds, then lower your leg and switch to your other leg. 5. Repeat 8 to 12 times on each leg. 6. When you can do this exercise with ease and no pain, repeat steps 1 through 5 using your arms instead of your legs. Raise one arm off the floor, holding your arm straight out in front of you. Be careful not to let your shoulder drop down, because that will twist your trunk. Then switch to your other arm.   7. When holding your arm straight out becomes easier, try raising your opposite leg at the same time, and repeat steps 1 through 5. Lower abdominal strengthening    1. Lie on your back with your knees bent and your feet flat on the floor. 2. Tighten your belly muscles by pulling your belly button in toward your spine. 3. Lift one foot off the floor and bring your knee toward your chest, so that your knee is straight above your hip and your leg is bent like the letter \"L. \"  4. Lift the other knee up to the same position. 5. Lower one leg at a time to the starting position. 6. Keep alternating legs until you have lifted each leg 8 to 12 times. 7. Be sure to keep your belly muscles tight and your back still as you are moving your legs. Be sure to breathe normally. Follow-up care is a key part of your treatment and safety. Be sure to make and go to all appointments, and call your doctor if you are having problems. It's also a good idea to know your test results and keep a list of the medicines you take. Where can you learn more? Go to http://yadira-shira.info/. Enter V111 in the search box to learn more about \"Snapping Hip Syndrome: Exercises. \"  Current as of: March 21, 2017  Content Version: 11.4  © 7224-7498 Healthwise, Incorporated. Care instructions adapted under license by [employer] (which disclaims liability or warranty for this information). If you have questions about a medical condition or this instruction, always ask your healthcare professional. [address] any warranty or liability for your use of this information.

## 2018-04-30 NOTE — MR AVS SNAPSHOT
[facility] 
 
 
 [address] [place] [address] 
[phone] Patient: Author Nunez MRN: [id number] WPO:[birth year] Visit Information Date & Time Provider Department Dept. Phone Encounter #  
 4/30/2018  1:20 PM Pineda Woodward MD [facility] [phone] [id number] Upcoming Health Maintenance Date Due  
 BREAST CANCER SCRN MAMMOGRAM 6/3/2005 ZOSTER VACCINE AGE 60> 4/3/2015 Influenza Age 5 to Adult 8/1/2018 PAP AKA CERVICAL CYTOLOGY 12/29/2020 COLONOSCOPY 1/11/2021 DTaP/Tdap/Td series (2 - Td) 3/8/2027 Allergies as of 4/30/2018  Review Complete On: 4/30/2018 By: Pineda Woodward MD  
  
 Severity Noted Reaction Type Reactions Ciprofloxacin  07/23/2010    Other (comments) Naproxen  07/23/2010    Unknown (comments) Bleeding in stomach Current Immunizations  Reviewed on 9/8/2017 Name Date Influenza Vaccine Alyne Sashally) 12/8/2014 Influenza Vaccine (Quad) PF 9/8/2017 Influenza Vaccine Split 12/13/2011 Pneumococcal Polysaccharide (PPSV-23) 12/29/2017 Tdap 3/8/2017 Not reviewed this visit You Were Diagnosed With   
  
 Codes Comments Fibromyalgia    -  Primary ICD-10-CM: M79.7 ICD-9-CM: 729.1 Snapping hip syndrome, left     ICD-10-CM: O74.620 ICD-9-CM: 719.65 Changing nevus     ICD-10-CM: D22.9 ICD-9-CM: 216.9 Breast cancer screening, high risk patient     ICD-10-CM: Z12.31 
ICD-9-CM: V76.11 Vitals BP Pulse Temp Resp Height(growth percentile) Weight(growth percentile) 129/78 (BP 1 Location: Left arm, BP Patient Position: Sitting) 79 98.4 °F (36.9 °C) 12 5' 2\" (1.575 m) 142 lb (64.4 kg) SpO2 BMI OB Status Smoking Status 96% 25.97 kg/m2 Hysterectomy Current Every Day Smoker BMI and BSA Data Body Mass Index Body Surface Area  
 25.97 kg/m 2 1.68 m 2 Preferred Pharmacy Pharmacy Name Phone [facility] [address] [phone] Your Updated Medication List  
  
   
This list is accurate as of 4/30/18  1:55 PM.  Always use your most recent med list.  
  
  
  
  
 ergocalciferol 50,000 unit capsule Commonly known as:  ERGOCALCIFEROL Take 1 Cap by mouth every seven (7) days. Indications: Vitamin D Deficiency  
  
 metoprolol succinate 25 mg XL tablet Commonly known as:  TOPROL-XL  
TAKE 1 TABLET BY MOUTH NIGHTLY.  
  
 pregabalin 75 mg capsule Commonly known as:  Evins Bita Take 1 Cap by mouth two (2) times a day. Max Daily Amount: 150 mg. Indications: FIBROMYALGIA  
  
 tiZANidine 4 mg tablet Commonly known as:  Cindi Nirav TAKE 1 TABLET BY MOUTH THREE TIMES DAILY AS NEEDED FOR FIBROMYALGIA SYMPTOMS  
  
 traZODone 100 mg tablet Commonly known as:  DESYREL  
TAKE 1 TABLET BY MOUTH NIGHTLY  
  
 TYLENOL EXTRA STRENGTH 500 mg tablet Generic drug:  acetaminophen Take  by mouth every six (6) hours as needed for Pain. TYLENOL PM PO Take  by mouth. Prescriptions Printed Refills  
 pregabalin (LYRICA) 75 mg capsule 3 Sig: Take 1 Cap by mouth two (2) times a day. Max Daily Amount: 150 mg. Indications: FIBROMYALGIA Class: Print Route: Oral  
  
To-Do List   
 04/30/2018 Imaging:  [facility] BI SCREENING INCL CAD Patient Instructions Snapping Hip Syndrome: Exercises Your Care Instructions Here are some examples of typical rehabilitation exercises for your condition. Start each exercise slowly. Ease off the exercise if you start to have pain. Your doctor or physical therapist will tell you when you can start these exercises and which ones will work best for you. How to do the exercises Iliotibial band stretch 1. Lean sideways against a wall. If you are not steady on your feet, hold on to a chair or counter.  
2. Stand on the leg with the affected hip, with that leg close to the wall. Then cross your other leg in front of it. 3. Let your affected hip drop out to the side of your body and against the wall. Then lean away from your affected hip until you feel a stretch. 4. Hold the stretch for 15 to 30 seconds. 5. Repeat 2 to 4 times. Hip flexor stretch (kneeling) 1. Kneel on your affected leg, and bend your good leg out in front of you, with that foot flat on the floor. If you feel discomfort in the front of your knee, place a towel under your knee. 2. Keeping your back straight, slowly push your hips forward until you feel a stretch in the upper thigh of your back leg and hip. 3. Hold the stretch for at least 15 to 30 seconds. 4. Repeat 2 to 4 times. Piriformis stretch 1. Lie on your back with both knees bent and your feet flat on the floor. 2. Put the ankle of your affected leg on your opposite thigh near your knee. 3. Use your hands to gently lift the knee of your good leg off the floor. Gently pull that knee toward your chest until you feel a stretch in the buttock and hip of your affected leg. 4. Hold the stretch for at least 15 to 30 seconds. 5. Repeat 2 to 4 times. Hamstring stretch (lying down) 1. Lie flat on your back with your legs straight. If you feel discomfort in your back, place a small towel roll under your lower back. 2. Holding the back of your affected leg for support, lift that leg straight up and toward your body until you feel a stretch at the back of your thigh. 3. Hold the stretch for at least 30 seconds. 4. Repeat 2 to 4 times. Bridging 1. Lie on your back with both knees bent. Your knees should be bent about 90 degrees. 2. Then push your feet into the floor, squeeze your buttocks, and lift your hips off the floor until your shoulders, hips, and knees are all in a straight line. 3. Hold for about 6 seconds as you continue to breathe normally, and then slowly lower your hips back down to the floor and rest for up to 10 seconds. 4. Repeat 8 to 12 times. Clamshell 1. Lie on your side, with your affected leg on top and your head propped on a pillow. Keep your feet and knees together and your knees bent. 2. Raise your top knee, but keep your feet together. Do not let your hips roll back. Your legs should open up like a clamshell. 3. Hold for 6 seconds. 4. Slowly lower your knee back down. Rest for 10 seconds. 5. Repeat 8 to 12 times. Alternate arm and leg (bird dog) exercise Do this exercise slowly. Try to keep your body straight at all times. 1. Start on the floor, on your hands and knees. 2. Tighten your belly muscles by pulling your belly button in toward your spine. Be sure you continue to breathe normally and do not hold your breath. 3. Raise one leg off the floor, and hold it straight out behind you. Be careful not to let your hip drop down, because that will twist your trunk. 4. Hold for about 6 seconds, then lower your leg and switch to your other leg. 5. Repeat 8 to 12 times on each leg. 6. When you can do this exercise with ease and no pain, repeat steps 1 through 5 using your arms instead of your legs. Raise one arm off the floor, holding your arm straight out in front of you. Be careful not to let your shoulder drop down, because that will twist your trunk. Then switch to your other arm. 7. When holding your arm straight out becomes easier, try raising your opposite leg at the same time, and repeat steps 1 through 5. Lower abdominal strengthening 1. Lie on your back with your knees bent and your feet flat on the floor. 2. Tighten your belly muscles by pulling your belly button in toward your spine. 3. Lift one foot off the floor and bring your knee toward your chest, so that your knee is straight above your hip and your leg is bent like the letter \"L. \" 
4. Lift the other knee up to the same position. 5. Lower one leg at a time to the starting position. 6. Keep alternating legs until you have lifted each leg 8 to 12 times. 7. Be sure to keep your belly muscles tight and your back still as you are moving your legs. Be sure to breathe normally. Follow-up care is a key part of your treatment and safety. Be sure to make and go to all appointments, and call your doctor if you are having problems. It's also a good idea to know your test results and keep a list of the medicines you take. Where can you learn more? Go to http://yadira-shira.info/. Enter V111 in the search box to learn more about \"Snapping Hip Syndrome: Exercises. \" Current as of: March 21, 2017 Content Version: 11.4 © 4757-9793 Healthwise, Incorporated. Care instructions adapted under license by [employer] (which disclaims liability or warranty for this information). If you have questions about a medical condition or this instruction, always ask your healthcare professional. [place] [address] any warranty or liability for your use of this information. Introducing [facility]! [facility] introduces Mega[employer] patient portal. Now you can access parts of your medical record, email your doctor's office, and request medication refills online. 1. In your internet browser, go to [email or web address] 2. Click on the First Time User? Click Here link in the Sign In box. You will see the New Member Sign Up page. 3. Enter your [employer] Access Code exactly as it appears below. You will not need to use this code after youve completed the sign-up process. If you do not sign up before the expiration date, you must request a new code. · Mega[employer] Access Code: [email or web address] Expires: 7/29/2018  1:50 PM 
 
4. Enter the last four digits of your Social Security Number (xxxx) and Date of Birth (mm/dd/yyyy) as indicated and click Submit. You will be taken to the next sign-up page. 5. Create a [facility] ID. This will be your [facility] login ID and cannot be changed, so think of one that is secure and easy to remember. 6. Create a [facility] password. You can change your password at any time. 7. Enter your Password Reset Question and Answer. This can be used at a later time if you forget your password. 8. Enter your e-mail address. You will receive e-mail notification when new information is available in [address] 9. Click Sign Up. You can now view and download portions of your medical record. 10. Click the Download Summary menu link to download a portable copy of your medical information. If you have questions, please visit the Frequently Asked Questions section of the [facility] website. Remember, [facility] is NOT to be used for urgent needs. For medical emergencies, dial 911. Now available from your iPhone and Android! Please provide this summary of care documentation to your next provider. Your primary care clinician is listed as Aliyah Bernstein. If you have any questions after today's visit, please call [phone].

## 2018-04-30 NOTE — PROGRESS NOTES
HPI  Henry Stroud is a 58 y.o. female who presents follow-up on fibromyalgia. She has been taking Lyrica with good relief. She does admit that she occasionally feels fatigued but feels like she has a good level of activity. Feels like her pain is manageable. we have done physical therapy for her in the past but she stopped going because she had a lot of other things going on and was able to keep active. She found a lot of benefit to physical therapy. I saw her last she was having \"good days and bad days\". now she seems to be having mostly good days. Has been on Prozac in the past and tolerated it well. We have talked about Cymbalta in the past and she would like to think about it. She does not feel the need for Cymbalta today. PMHx:  Past Medical History:   Diagnosis Date    Gastrointestinal disorder     proctitis    Other ill-defined conditions(799.89)     hereniated disc neck & lower spine    Other ill-defined conditions(799.89)     fibromyalgia    Other ill-defined conditions(799.89)     pleurisy       Meds:   Current Outpatient Prescriptions   Medication Sig Dispense Refill    pregabalin (LYRICA) 75 mg capsule Take 1 Cap by mouth two (2) times a day. Max Daily Amount: 150 mg. Indications: FIBROMYALGIA 60 Cap 3    tiZANidine (ZANAFLEX) 4 mg tablet TAKE 1 TABLET BY MOUTH THREE TIMES DAILY AS NEEDED FOR FIBROMYALGIA SYMPTOMS 30 Tab 2    metoprolol succinate (TOPROL-XL) 25 mg XL tablet TAKE 1 TABLET BY MOUTH NIGHTLY. 30 Tab 6    traZODone (DESYREL) 100 mg tablet TAKE 1 TABLET BY MOUTH NIGHTLY 30 Tab 11    ACETAMINOPHEN/DIPHENHYDRAMINE (TYLENOL PM PO) Take  by mouth.  acetaminophen (TYLENOL EXTRA STRENGTH) 500 mg tablet Take  by mouth every six (6) hours as needed for Pain.  ergocalciferol (ERGOCALCIFEROL) 50,000 unit capsule Take 1 Cap by mouth every seven (7) days. Indications: Vitamin D Deficiency 12 Cap 0       Allergies:    Allergies   Allergen Reactions    Ciprofloxacin Other (comments)    Naproxen Unknown (comments)     Bleeding in stomach         Smoker:  History   Smoking Status    Current Every Day Smoker    Packs/day: 1.00    Types: Cigarettes    Last attempt to quit: 9/10/2010   Smokeless Tobacco    Never Used       ETOH:   History   Alcohol Use    1.5 oz/week    3 Glasses of wine per week       FH:   Family History   Problem Relation Age of Onset    Heart Disease Mother     Heart Surgery Mother     Liver Disease Father     Other Sister      neuroma    Other Other      muscular dystrophy    Coronary Artery Disease Maternal Grandfather     Heart Attack Maternal Grandfather        ROS:   As listed in HPI. In addition:  Constitutional:   No headache, fever, fatigue, weight loss or weight gain      Cardiac:    No chest pain      Resp:   No cough or shortness of breath      Neuro   No loss of consciousness, dizziness, seizures      Physical Exam:  Blood pressure 129/78, pulse 79, temperature 98.4 °F (36.9 °C), resp. rate 12, height 5' 2\" (1.575 m), weight 142 lb (64.4 kg), SpO2 96 %. GEN: No apparent distress. Alert and oriented and responds to all questions appropriately. NEUROLOGIC:  No focal neurologic deficits. Strength and sensation grossly intact. Coordination and gait grossly intact. EXT: Well perfused. No edema. SKIN:  see picture below. She has a question about this sore spot on her nose. It is where her glasses rub. She has not noticed it before. Noticed because it was bleeding yesterday. She does not know how long it has been there. Asked to keep an eye on this. She has some actinic keratosis on her chest.  She has a nevus on her thigh that is also pictured. She has had it since she was 6years old but thinks it might be changing recently                   Assessment and Plan     Fibromyalgia  Continue Lyrica. Appears to be working well for her  Consider adding Cymbalta.   She does not feel the need for this medication at the moment  Predominate symptoms now appears to be fatigue    Snapping hip  She noticed that when she plants her left hip and externally rotate said she will get a snapping/popping sensation. Has been happening multiple times per day. Educated her on underlying cause and motions that will exacerbate this. Avoid these motions and do the exercises I provided. Might benefit from physical therapy. She is not thrilled about going back to physical therapy just the moment    Changing nevus, actinic keratosis  Refer to dermatology    She has not reestablish with her gynecologist as we discussed. We will go ahead and refer for mammogram to check this box    Gets labs in December      ICD-10-CM ICD-9-CM    1. Fibromyalgia M79.7 729.1 pregabalin (LYRICA) 75 mg capsule   2. 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 Changing nevus D22.9 216.9    4. Breast cancer screening, high risk patient Z12.31 V76.11 [facility] BI SCREENING INCL CAD       AVS given.  Pt expressed understanding of instructions

## 2018-05-09 ENCOUNTER — HOSPITAL ENCOUNTER (OUTPATIENT)
Dept: MAMMOGRAPHY | Age: 63
Discharge: HOME | End: 2018-05-09
Attending: FAMILY MEDICINE
Payer: COMMERCIAL

## 2018-05-09 DIAGNOSIS — Z12.39 BREAST CANCER SCREENING, HIGH RISK PATIENT: ICD-10-CM

## 2018-05-09 PROCEDURE — 77067 SCR MAMMO BI INCL CAD: CPT

## 2018-05-15 DIAGNOSIS — G47.00 INSOMNIA, UNSPECIFIED TYPE: ICD-10-CM

## 2018-05-15 DIAGNOSIS — M79.7 FIBROMYALGIA: ICD-10-CM

## 2018-05-15 RX ORDER — TRAZODONE HYDROCHLORIDE 100 MG/1
TABLET ORAL
Qty: 30 TAB | Refills: 11 | Status: SHIPPED | OUTPATIENT
Start: 2018-05-15 | End: 2018-08-31 | Stop reason: SDUPTHER

## 2018-06-11 DIAGNOSIS — M79.7 FIBROMYALGIA: ICD-10-CM

## 2018-06-12 RX ORDER — TIZANIDINE 4 MG/1
TABLET ORAL
Qty: 30 TAB | Refills: 2 | Status: SHIPPED | OUTPATIENT
Start: 2018-06-12 | End: 2018-08-13 | Stop reason: SDUPTHER

## 2018-07-26 ENCOUNTER — OFFICE VISIT (OUTPATIENT)
Dept: FAMILY MEDICINE CLINIC | Age: 63
End: 2018-07-26

## 2018-07-26 VITALS
OXYGEN SATURATION: 95 % | RESPIRATION RATE: 17 BRPM | TEMPERATURE: 98.4 F | DIASTOLIC BLOOD PRESSURE: 68 MMHG | WEIGHT: 139 LBS | HEART RATE: 75 BPM | HEIGHT: 62 IN | BODY MASS INDEX: 25.58 KG/M2 | SYSTOLIC BLOOD PRESSURE: 101 MMHG

## 2018-07-26 DIAGNOSIS — L03.031 PARONYCHIA OF GREAT TOE, RIGHT: Primary | ICD-10-CM

## 2018-07-26 RX ORDER — SULFAMETHOXAZOLE AND TRIMETHOPRIM 800; 160 MG/1; MG/1
1 TABLET ORAL 2 TIMES DAILY
Qty: 20 TAB | Refills: 0 | Status: SHIPPED | OUTPATIENT
Start: 2018-07-26 | End: 2018-08-21 | Stop reason: SDUPTHER

## 2018-07-26 NOTE — PROGRESS NOTES
Chief Complaint   Patient presents with    Nail Problem     possible infected ingrown toenail     Health Maintenance reviewed

## 2018-07-26 NOTE — PROGRESS NOTES
Subjective:   Joselo Mendez is a 61 y.o. female who complains of right first toe infection. REdness and drainage from under nail. No fever. Has soaked it a couple of times. Past Medical History:   Diagnosis Date    Breast cancer [facility]) 2002    right breast    Gastrointestinal disorder     proctitis    Other ill-defined conditions(799.89)     hereniated disc neck & lower spine    Other ill-defined conditions(799.89)     fibromyalgia    Other ill-defined conditions(799.89)     pleurisy     Social History   Substance Use Topics    Smoking status: Current Every Day Smoker     Packs/day: 1.00     Types: Cigarettes     Last attempt to quit: 9/10/2010    Smokeless tobacco: Never Used    Alcohol use 1.5 oz/week     3 Glasses of wine per week     Outpatient Prescriptions Marked as Taking for the 7/26/18 encounter (Office Visit) with Gabriel Dao MD   Medication Sig Dispense Refill    trimethoprim-sulfamethoxazole (BACTRIM DS, SEPTRA DS) 160-800 mg per tablet Take 1 Tab by mouth two (2) times a day for 10 days. 20 Tab 0    tiZANidine (ZANAFLEX) 4 mg tablet TAKE 1 TABLET BY MOUTH THREE TIMES DAILY AS NEEDED FOR FIBROMYALGIA SYMPTOMS 30 Tab 2    traZODone (DESYREL) 100 mg tablet TAKE 1 TABLET BY MOUTH NIGHTLY 30 Tab 11    pregabalin (LYRICA) 75 mg capsule Take 1 Cap by mouth two (2) times a day. Max Daily Amount: 150 mg. Indications: FIBROMYALGIA 60 Cap 3    metoprolol succinate (TOPROL-XL) 25 mg XL tablet TAKE 1 TABLET BY MOUTH NIGHTLY. 30 Tab 6    ergocalciferol (ERGOCALCIFEROL) 50,000 unit capsule Take 1 Cap by mouth every seven (7) days. Indications: Vitamin D Deficiency 12 Cap 0    ACETAMINOPHEN/DIPHENHYDRAMINE (TYLENOL PM PO) Take  by mouth.  acetaminophen (TYLENOL EXTRA STRENGTH) 500 mg tablet Take  by mouth every six (6) hours as needed for Pain.        Allergies   Allergen Reactions    Ciprofloxacin Other (comments)    Naproxen Unknown (comments)     Bleeding in stomach          Review of Systems  A comprehensive review of systems was negative except for that written in the HPI. Objective:     Visit Vitals    /68 (BP 1 Location: Left arm, BP Patient Position: Sitting)    Pulse 75    Temp 98.4 °F (36.9 °C) (Oral)    Resp 17    Ht 5' 2\" (1.575 m)    Wt 139 lb (63 kg)    LMP 05/09/2005    SpO2 95%    BMI 25.42 kg/m2     General:   alert, cooperative   Eyes: conjunctivae/scleras clear. PERRL, EOM's intact                   Heart: S1 and S2 normal,no murmurs noted    Lungs: Coarse to auscultation bilaterally, no increased work of breathing       Extremities: Right 1st toe with medial redness, purulent debris              Assessment/Plan:     1. Paronychia of great toe, right          Orders Placed This Encounter    trimethoprim-sulfamethoxazole (BACTRIM DS, SEPTRA DS) 160-800 mg per tablet     Sig: Take 1 Tab by mouth two (2) times a day for 10 days. Dispense:  20 Tab     Refill:  0         Verbal and written instructions (see AVS) provided. Patient expresses understanding of diagnosis and treatment plan.

## 2018-07-26 NOTE — PATIENT INSTRUCTIONS
Cellulitis: Care Instructions  Your Care Instructions    Cellulitis is a skin infection caused by bacteria, most often strep or staph. It often occurs after a break in the skin from a scrape, cut, bite, or puncture, or after a rash. Cellulitis may be treated without doing tests to find out what caused it. But your doctor may do tests, if needed, to look for a specific bacteria, like methicillin-resistant Staphylococcus aureus (MRSA). The doctor has checked you carefully, but problems can develop later. If you notice any problems or new symptoms, get medical treatment right away. Follow-up care is a key part of your treatment and safety. Be sure to make and go to all appointments, and call your doctor if you are having problems. It's also a good idea to know your test results and keep a list of the medicines you take. How can you care for yourself at home? · Take your antibiotics as directed. Do not stop taking them just because you feel better. You need to take the full course of antibiotics. · Prop up the infected area on pillows to reduce pain and swelling. Try to keep the area above the level of your heart as often as you can. · If your doctor told you how to care for your wound, follow your doctor's instructions. If you did not get instructions, follow this general advice:  ¨ Wash the wound with clean water 2 times a day. Don't use hydrogen peroxide or alcohol, which can slow healing. ¨ You may cover the wound with a thin layer of petroleum jelly, such as Vaseline, and a nonstick bandage. ¨ Apply more petroleum jelly and replace the bandage as needed. · Be safe with medicines. Take pain medicines exactly as directed. ¨ If the doctor gave you a prescription medicine for pain, take it as prescribed. ¨ If you are not taking a prescription pain medicine, ask your doctor if you can take an over-the-counter medicine.   To prevent cellulitis in the future  · Try to prevent cuts, scrapes, or other injuries to your skin. Cellulitis most often occurs where there is a break in the skin. · If you get a scrape, cut, mild burn, or bite, wash the wound with clean water as soon as you can to help avoid infection. Don't use hydrogen peroxide or alcohol, which can slow healing. · If you have swelling in your legs (edema), support stockings and good skin care may help prevent leg sores and cellulitis. · Take care of your feet, especially if you have diabetes or other conditions that increase the risk of infection. Wear shoes and socks. Do not go barefoot. If you have athlete's foot or other skin problems on your feet, talk to your doctor about how to treat them. When should you call for help? Call your doctor now or seek immediate medical care if:    · You have signs that your infection is getting worse, such as:  ¨ Increased pain, swelling, warmth, or redness. ¨ Red streaks leading from the area. ¨ Pus draining from the area. ¨ A fever.     · You get a rash.    Watch closely for changes in your health, and be sure to contact your doctor if:    · You do not get better as expected. Where can you learn more? Go to http://yadira-shira.info/. Jamie Collins in the search box to learn more about \"Cellulitis: Care Instructions. \"  Current as of: May 10, 2017  Content Version: 11.7  © 6512-8316 Healthwise, Incorporated. Care instructions adapted under license by [employer] (which disclaims liability or warranty for this information). If you have questions about a medical condition or this instruction, always ask your healthcare professional. [place] [address] any warranty or liability for your use of this information.

## 2018-07-26 NOTE — MR AVS SNAPSHOT
[facility] 
 
 
 [address] [place] [address] 
[phone] Patient: Koko Chacon MRN: [id number] ZPA:[birth year] Visit Information Date & Time Provider Department Dept. Phone Encounter #  
 7/26/2018  2:15 PM Mukul Pedersen [facility] [phone] [id number] Upcoming Health Maintenance Date Due ZOSTER VACCINE AGE 60> 4/3/2015 Influenza Age 5 to Adult 8/1/2018 BREAST CANCER SCRN MAMMOGRAM 5/9/2020 PAP AKA CERVICAL CYTOLOGY 12/29/2020 COLONOSCOPY 1/11/2021 DTaP/Tdap/Td series (2 - Td) 3/8/2027 Allergies as of 7/26/2018  Review Complete On: 7/26/2018 By: Jimmy Alberto Severity Noted Reaction Type Reactions Ciprofloxacin  07/23/2010    Other (comments) Naproxen  07/23/2010    Unknown (comments) Bleeding in stomach Current Immunizations  Reviewed on 9/8/2017 Name Date Influenza Vaccine Manuelita Forge) 12/8/2014 Influenza Vaccine (Quad) PF 9/8/2017 Influenza Vaccine Split 12/13/2011 Pneumococcal Polysaccharide (PPSV-23) 12/29/2017 Tdap 3/8/2017 Not reviewed this visit You Were Diagnosed With   
  
 Codes Comments Paronychia of great toe, right    -  Primary ICD-10-CM: L03.031 
ICD-9-CM: 681.11 Vitals BP Pulse Temp Resp Height(growth percentile) Weight(growth percentile) 101/68 (BP 1 Location: Left arm, BP Patient Position: Sitting) 75 98.4 °F (36.9 °C) (Oral) 17 5' 2\" (1.575 m) 139 lb (63 kg) LMP SpO2 BMI OB Status Smoking Status 05/09/2005 95% 25.42 kg/m2 Hysterectomy Current Every Day Smoker BMI and BSA Data Body Mass Index Body Surface Area  
 25.42 kg/m 2 1.66 m 2 Preferred Pharmacy Pharmacy Name Phone [facility] [address] [phone] Your Updated Medication List  
  
   
This list is accurate as of 7/26/18  3:01 PM.  Always use your most recent med list.  
  
  
  
  
 ergocalciferol 50,000 unit capsule Commonly known as:  ERGOCALCIFEROL Take 1 Cap by mouth every seven (7) days. Indications: Vitamin D Deficiency  
  
 metoprolol succinate 25 mg XL tablet Commonly known as:  TOPROL-XL  
TAKE 1 TABLET BY MOUTH NIGHTLY.  
  
 pregabalin 75 mg capsule Commonly known as:  Chacorta Kinney Take 1 Cap by mouth two (2) times a day. Max Daily Amount: 150 mg. Indications: FIBROMYALGIA  
  
 tiZANidine 4 mg tablet Commonly known as:  Anjelica Fulling TAKE 1 TABLET BY MOUTH THREE TIMES DAILY AS NEEDED FOR FIBROMYALGIA SYMPTOMS  
  
 traZODone 100 mg tablet Commonly known as:  DESYREL  
TAKE 1 TABLET BY MOUTH NIGHTLY  
  
 trimethoprim-sulfamethoxazole 160-800 mg per tablet Commonly known as:  BACTRIM DS, SEPTRA DS Take 1 Tab by mouth two (2) times a day for 10 days. TYLENOL EXTRA STRENGTH 500 mg tablet Generic drug:  acetaminophen Take  by mouth every six (6) hours as needed for Pain. TYLENOL PM PO Take  by mouth. Prescriptions Sent to Pharmacy Refills  
 trimethoprim-sulfamethoxazole (BACTRIM DS, SEPTRA DS) 160-800 mg per tablet 0 Sig: Take 1 Tab by mouth two (2) times a day for 10 days. Class: Normal  
 Pharmacy: [address] #: [phone] Route: Oral  
  
Patient Instructions Cellulitis: Care Instructions Your Care Instructions Cellulitis is a skin infection caused by bacteria, most often strep or staph. It often occurs after a break in the skin from a scrape, cut, bite, or puncture, or after a rash. Cellulitis may be treated without doing tests to find out what caused it. But your doctor may do tests, if needed, to look for a specific bacteria, like methicillin-resistant Staphylococcus aureus (MRSA). The doctor has checked you carefully, but problems can develop later.  If you notice any problems or new symptoms, get medical treatment right away. Follow-up care is a key part of your treatment and safety. Be sure to make and go to all appointments, and call your doctor if you are having problems. It's also a good idea to know your test results and keep a list of the medicines you take. How can you care for yourself at home? · Take your antibiotics as directed. Do not stop taking them just because you feel better. You need to take the full course of antibiotics. · Prop up the infected area on pillows to reduce pain and swelling. Try to keep the area above the level of your heart as often as you can. · If your doctor told you how to care for your wound, follow your doctor's instructions. If you did not get instructions, follow this general advice: ¨ Wash the wound with clean water 2 times a day. Don't use hydrogen peroxide or alcohol, which can slow healing. ¨ You may cover the wound with a thin layer of petroleum jelly, such as Vaseline, and a nonstick bandage. ¨ Apply more petroleum jelly and replace the bandage as needed. · Be safe with medicines. Take pain medicines exactly as directed. ¨ If the doctor gave you a prescription medicine for pain, take it as prescribed. ¨ If you are not taking a prescription pain medicine, ask your doctor if you can take an over-the-counter medicine. To prevent cellulitis in the future · Try to prevent cuts, scrapes, or other injuries to your skin. Cellulitis most often occurs where there is a break in the skin. · If you get a scrape, cut, mild burn, or bite, wash the wound with clean water as soon as you can to help avoid infection. Don't use hydrogen peroxide or alcohol, which can slow healing. · If you have swelling in your legs (edema), support stockings and good skin care may help prevent leg sores and cellulitis.  
· Take care of your feet, especially if you have diabetes or other conditions that increase the risk of infection. Wear shoes and socks. Do not go barefoot. If you have athlete's foot or other skin problems on your feet, talk to your doctor about how to treat them. When should you call for help? Call your doctor now or seek immediate medical care if: 
  · You have signs that your infection is getting worse, such as: 
¨ Increased pain, swelling, warmth, or redness. ¨ Red streaks leading from the area. ¨ Pus draining from the area. ¨ A fever.  
  · You get a rash.  
 Watch closely for changes in your health, and be sure to contact your doctor if: 
  · You do not get better as expected. Where can you learn more? Go to http://yadira-shira.info/. Parvez Ramos in the search box to learn more about \"Cellulitis: Care Instructions. \" Current as of: May 10, 2017 Content Version: 11.7 © 9745-6198 PatientKeeper. Care instructions adapted under license by [employer] (which disclaims liability or warranty for this information). If you have questions about a medical condition or this instruction, always ask your healthcare professional. [place] [address] any warranty or liability for your use of this information. Introducing [facility]! [employer] introduces SlimTrader patient portal. Now you can access parts of your medical record, email your doctor's office, and request medication refills online. 1. In your internet browser, go to [email or web address]). [email or web address] (W[employer]) 2. Click on the First Time User? Click Here link in the Sign In box. You will see the New Member Sign Up page. 3. Enter your SlimTrader Access Code exactly as it appears below. You will not need to use this code after youve completed the sign-up process. If you do not sign up before the expiration date, you must request a new code. · Slim[employer]ader Access Code: [email or web address] Expires: 7/29/2018  1:50 PM 
 
 4. Enter the last four digits of your Social Security Number (xxxx) and Date of Birth (mm/dd/yyyy) as indicated and click Submit. You will be taken to the next sign-up page. 5. Create a Quandoo ID. This will be your Quandoo login ID and cannot be changed, so think of one that is secure and easy to remember. 6. Create a Quandoo password. You can change your password at any time. 7. Enter your Password Reset Question and Answer. This can be used at a later time if you forget your password. 8. Enter your e-mail address. You will receive e-mail notification when new information is available in [address] 9. Click Sign Up. You can now view and download portions of your medical record. 10. Click the Download Summary menu link to download a portable copy of your medical information. If you have questions, please visit the Frequently Asked Questions section of the Quandoo website. Remember, Quandoo is NOT to be used for urgent needs. For medical emergencies, dial 911. Now available from your iPhone and Android! Please provide this summary of care documentation to your next provider. Your primary care clinician is listed as Jorge L Carr. If you have any questions after today's visit, please call [phone].

## 2018-07-26 NOTE — PROGRESS NOTES
3/21/2017     «PatientPrimaryInsurancePayerPlan»  «PatientPrimaryInsuranceAddressFormatted»    Attention: Provider Services    Re: Predetermination of Medical Necessity and Plan Benefits    Patient Name   Shaji Silva  Date of Birth   1955    Subscriber Name  «PatientTroySubscriber»  Date of Birth   Click here to enter a date. Relationship to Patient  «PatientJoaquinranceSubscriberRelatio»  Identification Number  «EvonneMemberID»  Group Number   «PatientJoaquinranceGroupID»          To Whom It May Concern:    Shaji Silva is a 64 y.o. female, who presented to my office for evaluation, diagnosis, and treatment. Shaji Silva reports that the symptoms she is experiencing are limiting her ability to perform the activities of daily living. The patient presented with   Aching/pain   [] One leg [x] Both legs  Heaviness     [] One leg [x] Both legs  Tiredness/fatigue   [] One leg [] Both legs  Itching/burning   [] One leg [] Both legs  Swollen ankles   [] One leg [x] Both legs  Leg cramp    [] One leg [x] Both legs  Restless legs   [] One leg [x] Both legs         Throbbing   [] One leg [x]Bothlegs. The patient states that she has suffered from these symptoms for long time, and, although conservative therapy does improve the symptoms intermittently, they do not alleviate them to the point that she is impaired in the daily activities of living. During prolonged periods of standing, the patient must sit or take a break due to aching, cramping, burning, itching, or swelling in the lower extremities. To compensate for this functional impairment, the patient has tried conservative therapies; however, these conservative measures have failed to resolve these symptoms.   The conservative treatment measures include the following:     Compression Stocking Therapy  o Period of Time:   - []   Three Weeks  - [x]  Three Months  - []   Six Months         o Strength of Compression:   - []  10 - 20 mmHg  - [x]  20 - 30 mmHg  - [] 30 - 40 mmHg   Use of Over-The-Counter Analgesics     Exercise  o Type: walking   o Frequency: daily     Elevation of Lower Extremity Above Heart Level  o Frequency: daily    A bilateral Duplex Ultrasound examination was performed, and the report is attached for review. The patients history reveals no presence of lymphedema, arterial insufficiency, or deep vein thrombosis; nor is the patient within six months post-partem. In conclusion, the summary of my findings and recommended, staged treatment include the following:    Diagnosis  Patient Active Problem List   Diagnosis Code    Back pain M54.9    Cervical neck pain with evidence of disc disease M50.90    Fibromyalgia M79.7    Ulcerative colitis ([facility] [address] K51.90    Anxiety F41.9    Nicotine abuse Z72.0    Palpitations R00.2    Lower extremity edema R60.0    Venous insufficiency of both lower extremities I87.2    Varicose veins of both lower extremities with pain I83.813    Varicose veins of both legs with edema I83.893       Recommended Treatment    Radiofrequency ablation of bilateral GSV and left SSV. The procedure(s) will be performed in my office using local tumescent anesthesia. Enclosed you will find the supporting documentation of my findings, which include the following:     History and Physical Examination   Doppler Examination   Lower Extremity Patient Photographs    The patient has requested that we verify coverage for the recommended treatment under their insurance plan and, understandably, she is anxious to move forward in finding relief. I appreciate your review and response at your earliest convenience. Should you have any questions, please feel free to contact me at [phone].     Sincerely,    Devan Cancino MD    [facility]  Enclosure Pt rcvd in room 9, aox3, wheelchair bound, hx of polio. Pt presents to the Pt rcvd in room 9, aox3, wheelchair bound, hx of polio. Pt presents to the ED for 4 days of diarrhea, fever and weakness, pt reported that he noticed black stool as well. Pt denies abd pain, chest pain, SOB at this time. MD lee done, 20G placed on LFA, labs sent, nAD, will monitor. Pt rcvd in room 9, aox3, wheelchair bound, hx of polio. Pt presents to the ED for 4 days of diarrhea, fever and weakness, pt reported that he noticed black stool as well. Pt denies abd pain, chest pain, SOB at this time. MD lee done, 20G placed on LFA, labs sent, nAD, will monitor.  Addendum: Pt refused to change into a gown, MD Woodson aware, skin intact, both feet noted with swelling.

## 2018-08-13 DIAGNOSIS — M79.7 FIBROMYALGIA: ICD-10-CM

## 2018-08-14 RX ORDER — TIZANIDINE 4 MG/1
TABLET ORAL
Qty: 90 TAB | Refills: 11 | Status: SHIPPED | OUTPATIENT
Start: 2018-08-14 | End: 2019-09-03 | Stop reason: SDUPTHER

## 2018-08-21 ENCOUNTER — TELEPHONE (OUTPATIENT)
Dept: FAMILY MEDICINE CLINIC | Age: 63
End: 2018-08-21

## 2018-08-21 RX ORDER — SULFAMETHOXAZOLE AND TRIMETHOPRIM 800; 160 MG/1; MG/1
1 TABLET ORAL 2 TIMES DAILY
Qty: 20 TAB | Refills: 0 | Status: SHIPPED | OUTPATIENT
Start: 2018-08-21 | End: 2018-08-31

## 2018-08-21 NOTE — TELEPHONE ENCOUNTER
Patient notified Bactrim sent to [facility]. She will follow up after wedding.

## 2018-08-21 NOTE — TELEPHONE ENCOUNTER
Patient states she stumped toe a week ago. She states it is red, swollen and painful. Patient is requesting antibiotic. Patients daughter is getting  this weekend and she doesn't have time to come in for appointment. Please advise.

## 2018-08-30 ENCOUNTER — TELEPHONE (OUTPATIENT)
Dept: FAMILY MEDICINE CLINIC | Age: 63
End: 2018-08-30

## 2018-08-30 NOTE — TELEPHONE ENCOUNTER
Called pt. She wants to be seen regarding the infection in her toe. She also wanted a physical. Informed her that we can see her for the toe but that she would need a separate appt for the physical. Pt will come in with her mom for her appt with Dr. Syd Mallory and will put pt on the schedule for appt after mom's.

## 2018-08-31 ENCOUNTER — OFFICE VISIT (OUTPATIENT)
Dept: FAMILY MEDICINE CLINIC | Age: 63
End: 2018-08-31

## 2018-08-31 DIAGNOSIS — M79.7 FIBROMYALGIA: ICD-10-CM

## 2018-08-31 DIAGNOSIS — Z00.00 ANNUAL PHYSICAL EXAM: ICD-10-CM

## 2018-08-31 DIAGNOSIS — G47.00 INSOMNIA, UNSPECIFIED TYPE: ICD-10-CM

## 2018-08-31 DIAGNOSIS — S99.921A TOE INJURY, RIGHT, INITIAL ENCOUNTER: Primary | ICD-10-CM

## 2018-08-31 RX ORDER — TRAZODONE HYDROCHLORIDE 50 MG/1
25 TABLET ORAL
Qty: 30 TAB | Refills: 1 | Status: SHIPPED | OUTPATIENT
Start: 2018-08-31 | End: 2018-12-27 | Stop reason: SDUPTHER

## 2018-08-31 NOTE — PROGRESS NOTES
Subjective:     Job Pimentel is a 61 y.o. female who presents today with the following:  Chief Complaint   Patient presents with    Toe Injury     Patient with 1 month history of R great toe injury and reinflammation. Has taken 2 courses of antibiotics (bactrim) that improved inflammation, but feels that it has reoccurred several times. Had a repeat course of anbitiocs recently, is thinking she may need to go see a podiatrist.      Memory Loss   Patient states he has been struggling with memory loss for quite some time, for at least the last 7-8 months. Feels she has difficulty with remembering names, completing tasks. She finds that she is forgetting more and more, including long standing plans. Takes a number of different medications to help her sleep at night, continues to drink alcohol occasionally at night, and then drinks monster energy drinks during the day to help her stay awake. ROS:  Gen: denies fever, chills, fatigue, weight loss, weight gain  HEENT:denies blurry vision, nasal congestion, sore throat  Resp: denies dypsnea, cough, wheezing  CV: denies chest pain, palpitations, lower extremity edema  Abd: denies nausea, vomiting, diarrhea, constipation  Neuro: denies numbness/tingling  Endo: denies polyuria, polydipsia, heat/cold intolerance    Allergies   Allergen Reactions    Ciprofloxacin Other (comments)    Naproxen Unknown (comments)     Bleeding in stomach           Current Outpatient Prescriptions:     traZODone (DESYREL) 50 mg tablet, Take 0.5 Tabs by mouth nightly., Disp: 30 Tab, Rfl: 1    tiZANidine (ZANAFLEX) 4 mg tablet, TAKE 1 TABLET BY MOUTH THREE TIMES DAILY AS NEEDED FOR FIBROMYALGIA SYMPTOMS, Disp: 90 Tab, Rfl: 11    pregabalin (LYRICA) 75 mg capsule, Take 1 Cap by mouth two (2) times a day. Max Daily Amount: 150 mg.  Indications: FIBROMYALGIA, Disp: 60 Cap, Rfl: 3    metoprolol succinate (TOPROL-XL) 25 mg XL tablet, TAKE 1 TABLET BY MOUTH NIGHTLY., Disp: 30 Tab, Rfl: 6    ergocalciferol (ERGOCALCIFEROL) 50,000 unit capsule, Take 1 Cap by mouth every seven (7) days.  Indications: Vitamin D Deficiency, Disp: 12 Cap, Rfl: 0    ACETAMINOPHEN/DIPHENHYDRAMINE (TYLENOL PM PO), Take  by mouth., Disp: , Rfl:     acetaminophen (TYLENOL EXTRA STRENGTH) 500 mg tablet, Take  by mouth every six (6) hours as needed for Pain., Disp: , Rfl:     Past Medical History:   Diagnosis Date    Breast cancer ([facility] [address] 2002    right breast    Gastrointestinal disorder     proctitis    Other ill-defined conditions(799.89)     hereniated disc neck & lower spine    Other ill-defined conditions(799.89)     fibromyalgia    Other ill-defined conditions(799.89)     pleurisy       Past Surgical History:   Procedure Laterality Date    BREAST SURGERY PROCEDURE UNLISTED      breast cancer    HX BREAST LUMPECTOMY Right 2002    cancer    HX GYN  2696,1563,1837    c sections    HX HYSTERECTOMY      HX ORTHOPAEDIC      left foot sx    HX OTHER SURGICAL         History   Smoking Status    Current Every Day Smoker    Packs/day: 1.00    Types: Cigarettes    Last attempt to quit: 9/10/2010   Smokeless Tobacco    Never Used       Social History     Social History    Marital status:      Spouse name: N/A    Number of children: N/A    Years of education: N/A     Social History Main Topics    Smoking status: Current Every Day Smoker     Packs/day: 1.00     Types: Cigarettes     Last attempt to quit: 9/10/2010    Smokeless tobacco: Never Used    Alcohol use 1.5 oz/week     3 Glasses of wine per week    Drug use: No    Sexual activity: Not Asked     Other Topics Concern    None     Social History Narrative       Family History   Problem Relation Age of Onset    Heart Disease Mother     Heart Surgery Mother     Liver Disease Father     Other Sister      neuroma    Other Other      muscular dystrophy    Coronary Artery Disease Maternal Grandfather     Heart Attack Maternal Grandfather     Breast Cancer Paternal Aunt          Objective:   Gen: alert, oriented, no acute distress  Head: normocephalic, atraumatic  Eyes:sclera clear, conjunctiva clear  Oral: moist mucus membranes, no oral lesions, no pharyngeal exudate, no pharyngeal erythema  Neck: symmetric normal sized thyroid, no carotid bruits, no JVD  Resp: Normal work of breathing, lungs CTAB, no w/r/r  CV: S1, S2 normal.  No murmurs, rubs, or gallops. Abd:  Normal bowel sounds. Soft, not tender, not distended. Skin: no rash             Extremities: R great toe slightly erythematous with minor amount of swelling on lateral aspect of toe. Results for orders placed or performed in visit on 08/31/18   CBC WITH AUTOMATED DIFF   Result Value Ref Range    WBC 5.5 3.4 - 10.8 x10E3/uL    RBC 3.84 3.77 - 5.28 x10E6/uL    HGB 12.8 11.1 - 15.9 g/dL    HCT 38.4 34.0 - 46.6 %     (H) 79 - 97 fL    MCH 33.3 (H) 26.6 - 33.0 pg    MCHC 33.3 31.5 - 35.7 g/dL    RDW 14.5 12.3 - 15.4 %    PLATELET 044 185 - 146 x10E3/uL    NEUTROPHILS 53 Not Estab. %    Lymphocytes 38 Not Estab. %    MONOCYTES 7 Not Estab. %    EOSINOPHILS 1 Not Estab. %    BASOPHILS 1 Not Estab. %    ABS. NEUTROPHILS 2.9 1.4 - 7.0 x10E3/uL    Abs Lymphocytes 2.1 0.7 - 3.1 x10E3/uL    ABS. MONOCYTES 0.4 0.1 - 0.9 x10E3/uL    ABS. EOSINOPHILS 0.1 0.0 - 0.4 x10E3/uL    ABS. BASOPHILS 0.1 0.0 - 0.2 x10E3/uL    IMMATURE GRANULOCYTES 0 Not Estab. %    ABS. IMM.  GRANS. 0.0 0.0 - 0.1 x10E3/uL    Narrative    Performed at:  [address]  [id number]  : Jian Garcia MD, Phone:  [phone]   METABOLIC PANEL, COMPREHENSIVE   Result Value Ref Range    Glucose 89 65 - 99 mg/dL    BUN 18 8 - 27 mg/dL    Creatinine 0.90 0.57 - 1.00 mg/dL    GFR est non-AA 68 >59 mL/min/1.73    GFR est AA 79 >59 mL/min/1.73    BUN/Creatinine ratio 20 12 - 28    Sodium 138 134 - 144 mmol/L    Potassium 4.7 3.5 - 5.2 mmol/L    Chloride 98 96 - 106 mmol/L CO2 23 20 - 29 mmol/L    Calcium 9.5 8.7 - 10.3 mg/dL    Protein, total 6.8 6.0 - 8.5 g/dL    Albumin 4.6 3.6 - 4.8 g/dL    GLOBULIN, TOTAL 2.2 1.5 - 4.5 g/dL    A-G Ratio 2.1 1.2 - 2.2    Bilirubin, total 0.3 0.0 - 1.2 mg/dL    Alk. phosphatase 65 39 - 117 IU/L    AST (SGOT) 29 0 - 40 IU/L    ALT (SGPT) 16 0 - 32 IU/L    Narrative    Performed at:  [address]  [id number]  : Vivian Adams MD, Phone:  [phone]   HEMOGLOBIN A1C WITH EAG   Result Value Ref Range    Hemoglobin A1c 5.3 4.8 - 5.6 %    Estimated average glucose 105 mg/dL    Narrative    Performed at:  [address]  [id number]  : Vivian Adams MD, Phone:  [phone]   TSH 3RD GENERATION   Result Value Ref Range    TSH 0.895 0.450 - 4.500 uIU/mL    Narrative    Performed at:  [address]  [id number]  : Vivian Adams MD, Phone:  [phone]   LIPID PANEL   Result Value Ref Range    Cholesterol, total 192 100 - 199 mg/dL    Triglyceride 93 0 - 149 mg/dL    HDL Cholesterol 77 >39 mg/dL    VLDL, calculated 19 5 - 40 mg/dL    LDL, calculated 96 0 - 99 mg/dL    Narrative    Performed at:  [address]  [id number]  : Vivian Adams MD, Phone:  [phone]   CVD REPORT   Result Value Ref Range    INTERPRETATION Note     Narrative    Performed at:  [address] [place] [address]  [id number]  : Mechele Ganser MD, Phone:  [phone]       Assessment/ Plan:   Diagnoses and all orders for this visit:    1. Toe injury, right, initial encounter  -     CBC WITH AUTOMATED DIFF  -     METABOLIC PANEL, COMPREHENSIVE  -     HEMOGLOBIN A1C WITH EAG  -     TSH 3RD GENERATION  -     LIPID PANEL  -     REFERRAL TO PODIATRY    Gave patient referral to podiatry.     2. Fibromyalgia  -     traZODone (DESYREL) 50 mg tablet; Take 0.5 Tabs by mouth nightly. -     CBC WITH AUTOMATED DIFF  -     METABOLIC PANEL, COMPREHENSIVE  -     HEMOGLOBIN A1C WITH EAG  -     TSH 3RD GENERATION  -     LIPID PANEL    3. Insomnia, unspecified type  -     traZODone (DESYREL) 50 mg tablet; Take 0.5 Tabs by mouth nightly. -     CBC WITH AUTOMATED DIFF  -     METABOLIC PANEL, COMPREHENSIVE  -     HEMOGLOBIN A1C WITH EAG  -     TSH 3RD GENERATION  -     LIPID PANEL    Discussed with patient that she has a lot of medication on board to help her sleep, and that alcohol is not going to help with this. She also takes Lyrica for fibromyalgia. Advised her that she can try tapering off Trazodone and see if that helps her feel more alert during the day. Ideally, she would not have to take both energy drinks to stay awake and medication to help her sleep. Will also check labs and follow up with results at annual exam which is scheduled for next week. 4. Annual physical exam  -     CBC WITH AUTOMATED DIFF  -     METABOLIC PANEL, COMPREHENSIVE  -     HEMOGLOBIN A1C WITH EAG  -     TSH 3RD GENERATION  -     LIPID PANEL    Other orders  -     CVD REPORT         Verbal and written instructions (see AVS) provided.  Patient expresses understanding of diagnosis and treatment plan. Claudine Chen.  Guy Do MD

## 2018-08-31 NOTE — MR AVS SNAPSHOT
[address] [facility] 
 
 
 [address] [id number] [facility] 
[phone] Patient: Kaleb Donato MRN: [id number] Visit Information Date & Time Provider Department Dept. Phone Encounter #  
 8/31/2018 11:30 AM Mukul Valdes [facility] [phone] [id number] Upcoming Health Maintenance Date Due ZOSTER VACCINE AGE 60> 4/3/2015 Influenza Age 5 to Adult 8/1/2018 BREAST CANCER SCRN MAMMOGRAM 5/9/2020 PAP AKA CERVICAL CYTOLOGY 12/29/2020 COLONOSCOPY 1/11/2021 DTaP/Tdap/Td series (2 - Td) 3/8/2027 Allergies as of 8/31/2018  Review Complete On: 8/31/2018 By: Chela Dowell LPN Severity Noted Reaction Type Reactions Ciprofloxacin  07/23/2010    Other (comments) Naproxen  07/23/2010    Unknown (comments) Bleeding in stomach Current Immunizations  Reviewed on 9/8/2017 Name Date Influenza Vaccine Arletha Ingrid) 12/8/2014 Influenza Vaccine (Quad) PF 9/8/2017 Influenza Vaccine Split 12/13/2011 Pneumococcal Polysaccharide (PPSV-23) 12/29/2017 Tdap 3/8/2017 Not reviewed this visit You Were Diagnosed With   
  
 Codes Comments Toe injury, right, initial encounter    -  Primary ICD-10-CM: J61.599O ICD-9-CM: 959.7 Fibromyalgia     ICD-10-CM: M79.7 ICD-9-CM: 729.1 Insomnia, unspecified type     ICD-10-CM: G47.00 ICD-9-CM: 780.52 Annual physical exam     ICD-10-CM: Z00.00 ICD-9-CM: V70.0 Vitals LMP OB Status Smoking Status 05/09/2005 Hysterectomy Current Every Day Smoker Preferred Pharmacy Pharmacy Name [facility] [address] [phone] Your Updated Medication List  
  
   
This list is accurate as of 8/31/18 12:13 PM.  Always use your most recent med list.  
  
  
  
  
 ergocalciferol 50,000 unit capsule Commonly known as:  ERGOCALCIFEROL Take 1 Cap by mouth every seven (7) days. Indications: Vitamin D Deficiency  
  
 metoprolol succinate 25 mg XL tablet Commonly known as:  TOPROL-XL  
TAKE 1 TABLET BY MOUTH NIGHTLY.  
  
 pregabalin 75 mg capsule Commonly known as:  Briana Fink Take 1 Cap by mouth two (2) times a day. Max Daily Amount: 150 mg. Indications: FIBROMYALGIA  
  
 tiZANidine 4 mg tablet Commonly known as:  Colusa Sleek TAKE 1 TABLET BY MOUTH THREE TIMES DAILY AS NEEDED FOR FIBROMYALGIA SYMPTOMS  
  
 traZODone 50 mg tablet Commonly known as:  Junella Nieto Take 0.5 Tabs by mouth nightly. trimethoprim-sulfamethoxazole 160-800 mg per tablet Commonly known as:  BACTRIM DS, SEPTRA DS Take 1 Tab by mouth two (2) times a day for 10 days. TYLENOL EXTRA STRENGTH 500 mg tablet Generic drug:  acetaminophen Take  by mouth every six (6) hours as needed for Pain. TYLENOL PM PO Take  by mouth. Prescriptions Sent to Pharmacy Refills  
 traZODone (DESYREL) 50 mg tablet 1 Sig: Take 0.5 Tabs by mouth nightly. Class: Normal  
 Pharmacy: [address] Ph #: [phone] Route: Oral  
  
We Performed the Following CBC WITH AUTOMATED DIFF [51583 CPT(R)] HEMOGLOBIN A1C WITH EAG [12672 CPT(R)] LIPID PANEL [71292 CPT(R)] METABOLIC PANEL, COMPREHENSIVE [30741 CPT(R)] REFERRAL TO PODIATRY [REF90 Custom] [facility] 3RD GENERATION [82940 CPT(R)] Referral Information Referral ID Referred By Referred To  
  
 [id number] DANNY, [address] [place] [address], P.C[facility]   
   [address] [place] Visits Status Start Date End Date 1 New Request 8/31/18 8/31/19 If your referral has a status of pending review or denied, additional information will be sent to support the outcome of this decision. Introducing [facility]! Bunny Turner introduces [employer] patient portal. Now you can access parts of your medical record, email your doctor's office, and request medication refills online. 1. In your internet browser, go to [email or web address]. [employer] 2. Click on the First Time User? Click Here link in the Sign In box. You will see the New Member Sign Up page. 3. Enter your [employer] Access Code exactly as it appears below. You will not need to use this code after youve completed the sign-up process. If you do not sign up before the expiration date, you must request a new code. · [employer] Access Code: [email or web address] Expires: 11/29/2018 12:13 PM 
 
4. Enter the last four digits of your Social Security Number (xxxx) and Date of Birth (mm/dd/yyyy) as indicated and click Submit. You will be taken to the next sign-up page. 5. Create a [employer] ID. This will be your [employer] login ID and cannot be changed, so think of one that is secure and easy to remember. 6. Create a [employer] password. You can change your password at any time. 7. Enter your Password Reset Question and Answer. This can be used at a later time if you forget your password. 8. Enter your e-mail address. You will receive e-mail notification when new information is available in [address] 9. Click Sign Up. You can now view and download portions of your medical record. 10. Click the Download Summary menu link to download a portable copy of your medical information. If you have questions, please visit the Frequently Asked Questions section of the [employer] website. Remember, [employer] is NOT to be used for urgent needs. For medical emergencies, dial 911. Now available from your iPhone and Android! Please provide this summary of care documentation to your next provider. Your primary care clinician is listed as Derrick Gould. If you have any questions after today's visit, please call [phone].

## 2018-08-31 NOTE — PROGRESS NOTES
Chief Complaint   Patient presents with    Toe Injury     1. Have you been to the ER, urgent care clinic since your last visit? Hospitalized since your last visit? No    2. Have you seen or consulted any other health care providers outside of the [address] [place] [address] since your last visit? Include any pap smears or colon screening.  No

## 2018-09-01 LAB
ALBUMIN SERPL-MCNC: 4.6 G/DL (ref 3.6–4.8)
ALBUMIN/GLOB SERPL: 2.1 {RATIO} (ref 1.2–2.2)
ALP SERPL-CCNC: 65 IU/L (ref 39–117)
ALT SERPL-CCNC: 16 IU/L (ref 0–32)
AST SERPL-CCNC: 29 IU/L (ref 0–40)
BASOPHILS # BLD AUTO: 0.1 X10E3/UL (ref 0–0.2)
BASOPHILS NFR BLD AUTO: 1 %
BILIRUB SERPL-MCNC: 0.3 MG/DL (ref 0–1.2)
BUN SERPL-MCNC: 18 MG/DL (ref 8–27)
BUN/CREAT SERPL: 20 (ref 12–28)
CALCIUM SERPL-MCNC: 9.5 MG/DL (ref 8.7–10.3)
CHLORIDE SERPL-SCNC: 98 MMOL/L (ref 96–106)
CHOLEST SERPL-MCNC: 192 MG/DL (ref 100–199)
CO2 SERPL-SCNC: 23 MMOL/L (ref 20–29)
CREAT SERPL-MCNC: 0.9 MG/DL (ref 0.57–1)
EOSINOPHIL # BLD AUTO: 0.1 X10E3/UL (ref 0–0.4)
EOSINOPHIL NFR BLD AUTO: 1 %
ERYTHROCYTE [DISTWIDTH] IN BLOOD BY AUTOMATED COUNT: 14.5 % (ref 12.3–15.4)
EST. AVERAGE GLUCOSE BLD GHB EST-MCNC: 105 MG/DL
GLOBULIN SER CALC-MCNC: 2.2 G/DL (ref 1.5–4.5)
GLUCOSE SERPL-MCNC: 89 MG/DL (ref 65–99)
HBA1C MFR BLD: 5.3 % (ref 4.8–5.6)
HCT VFR BLD AUTO: 38.4 % (ref 34–46.6)
HDLC SERPL-MCNC: 77 MG/DL
HGB BLD-MCNC: 12.8 G/DL (ref 11.1–15.9)
IMM GRANULOCYTES # BLD: 0 X10E3/UL (ref 0–0.1)
IMM GRANULOCYTES NFR BLD: 0 %
INTERPRETATION, 910389: NORMAL
LDLC SERPL CALC-MCNC: 96 MG/DL (ref 0–99)
LYMPHOCYTES # BLD AUTO: 2.1 X10E3/UL (ref 0.7–3.1)
LYMPHOCYTES NFR BLD AUTO: 38 %
MCH RBC QN AUTO: 33.3 PG (ref 26.6–33)
MCHC RBC AUTO-ENTMCNC: 33.3 G/DL (ref 31.5–35.7)
MCV RBC AUTO: 100 FL (ref 79–97)
MONOCYTES # BLD AUTO: 0.4 X10E3/UL (ref 0.1–0.9)
MONOCYTES NFR BLD AUTO: 7 %
NEUTROPHILS # BLD AUTO: 2.9 X10E3/UL (ref 1.4–7)
NEUTROPHILS NFR BLD AUTO: 53 %
PLATELET # BLD AUTO: 213 X10E3/UL (ref 150–379)
POTASSIUM SERPL-SCNC: 4.7 MMOL/L (ref 3.5–5.2)
PROT SERPL-MCNC: 6.8 G/DL (ref 6–8.5)
RBC # BLD AUTO: 3.84 X10E6/UL (ref 3.77–5.28)
SODIUM SERPL-SCNC: 138 MMOL/L (ref 134–144)
TRIGL SERPL-MCNC: 93 MG/DL (ref 0–149)
TSH SERPL DL<=0.005 MIU/L-ACNC: 0.9 UIU/ML (ref 0.45–4.5)
VLDLC SERPL CALC-MCNC: 19 MG/DL (ref 5–40)
WBC # BLD AUTO: 5.5 X10E3/UL (ref 3.4–10.8)

## 2018-09-06 ENCOUNTER — OFFICE VISIT (OUTPATIENT)
Dept: FAMILY MEDICINE CLINIC | Age: 63
End: 2018-09-06

## 2018-09-06 VITALS
DIASTOLIC BLOOD PRESSURE: 82 MMHG | BODY MASS INDEX: 25.21 KG/M2 | RESPIRATION RATE: 14 BRPM | TEMPERATURE: 98.7 F | HEIGHT: 62 IN | OXYGEN SATURATION: 96 % | HEART RATE: 79 BPM | WEIGHT: 137 LBS | SYSTOLIC BLOOD PRESSURE: 139 MMHG

## 2018-09-06 DIAGNOSIS — M79.7 FIBROMYALGIA: ICD-10-CM

## 2018-09-06 DIAGNOSIS — K51.919 ULCERATIVE COLITIS WITH COMPLICATION, UNSPECIFIED LOCATION (HCC): ICD-10-CM

## 2018-09-06 DIAGNOSIS — Z00.00 ANNUAL PHYSICAL EXAM: Primary | ICD-10-CM

## 2018-09-06 DIAGNOSIS — G47.00 INSOMNIA, UNSPECIFIED TYPE: ICD-10-CM

## 2018-09-06 DIAGNOSIS — Z23 ENCOUNTER FOR IMMUNIZATION: ICD-10-CM

## 2018-09-06 NOTE — PROGRESS NOTES
Reid Garvey 61 y.o. Reid Garvey female presents for annual exam.    No LMP recorded. Patient is postmenopausal.    Screening:   Colonoscopy: 2011  Mammogram: 2018    Chronic issues:    Lethargy/Memory issues: We decreased dose of Trazodone at visit last week in an effort to help patient be more alert during the day. Patient states she feels much more alert during the day taking 1/2 the dose she was taking. She has also changed the time of day when she takes Lyrica and is only taking it in the AM now. She feels overall she is doing well. Ulcerative Colitis  Sees maureen Nieves for follow up. Will call for appointment. Immunizations:   Flu: due  TdaP: 2017    Diet & Exercise:  Diet: eats a balanced diet, sometimes does not eat  Exercise: Allergies   Allergen Reactions    Ciprofloxacin Other (comments)    Naproxen Unknown (comments)     Bleeding in stomach         Current Outpatient Prescriptions:     traZODone (DESYREL) 50 mg tablet, Take 0.5 Tabs by mouth nightly., Disp: 30 Tab, Rfl: 1    tiZANidine (ZANAFLEX) 4 mg tablet, TAKE 1 TABLET BY MOUTH THREE TIMES DAILY AS NEEDED FOR FIBROMYALGIA SYMPTOMS, Disp: 90 Tab, Rfl: 11    pregabalin (LYRICA) 75 mg capsule, Take 1 Cap by mouth two (2) times a day. Max Daily Amount: 150 mg. Indications: FIBROMYALGIA, Disp: 60 Cap, Rfl: 3    metoprolol succinate (TOPROL-XL) 25 mg XL tablet, TAKE 1 TABLET BY MOUTH NIGHTLY., Disp: 30 Tab, Rfl: 6    ergocalciferol (ERGOCALCIFEROL) 50,000 unit capsule, Take 1 Cap by mouth every seven (7) days.  Indications: Vitamin D Deficiency, Disp: 12 Cap, Rfl: 0    ACETAMINOPHEN/DIPHENHYDRAMINE (TYLENOL PM PO), Take  by mouth., Disp: , Rfl:     acetaminophen (TYLENOL EXTRA STRENGTH) 500 mg tablet, Take  by mouth every six (6) hours as needed for Pain., Disp: , Rfl:       ROS  Gen: denies fever, chills, fatigue, weight loss, weight gain  Eyes: denies blurry vision  Nose: denies nasal congestion  Mouth: denies sore throat  Resp: denies dypsnea, cough, wheezing  CV: denies chest pain, palpitations, lower extremity edema  Abd: denies nausea, vomiting, diarrhea, constipation  Neuro: denies numbness/tingling  Endo: denies polyuria, polydipsia, heat/cold intolerance    Visit Vitals    /82 (BP 1 Location: Left arm, BP Patient Position: Sitting)    Pulse 79    Temp 98.7 °F (37.1 °C)    Resp 14    Ht 5' 2\" (1.575 m)    Wt 137 lb (62.1 kg)    LMP 05/09/2005    SpO2 96%    BMI 25.06 kg/m2     Gen: alert, oriented, no acute distress  Head: NCAT  Eyes: PERRLA, sclera clear, conjunctiva clear  Nose: normal turbinates, no rhinorrhea, no sinus tenderness  Oral: moist mucus membranes, no oral lesions, no pharyngeal inflammation or exudate  Neck: supple, midline trachea, no retractions. Thyroid WNL. Resp: Lungs CTAB, no wheezing, rales or rhonchi  CV: Normal rhythm, normal S1/S2, no m/r/g. No LE edema  Abd: soft, not tender, not distended. No hepatosplenomegaly. Normal bowel sounds. MSK: normal strength and movement in all extremities, reflexes age appropriate  Skin: no lesion or rash      Diagnoses and all orders for this visit:    1. Annual physical exam  -labs reviewed with patient, all normal  Healthy balanced diet low in carbohydrates, saturated fats and red meats and rich in fiber, protein, fruits and vegetables recommended. Recommended 30 minutes cardiovascular exercise such as brisk walking/running at leas 3-5x a week. Encouraged her to further cut back on alcohol    2. Insomnia, unspecified type  -continue reduced dose of trazodone    3. Fibromyalgia  -pt stopped taking evening dose of Lyrica d/t interactions with medication and wine; advised her to try and cut out wine    4. Ulcerative colitis with complication, unspecified location [facility])  -advised patient she needs to follow up with GI and have colonoscopy. She will call GI office to schedule        Health Maintenance reviewed - flu shot given.     Healthy balanced diet low in carbohydrates, saturated fats and red meats and rich in fiber, protein, fruits and vegetables recommended. Recommended 30 minutes cardiovascular exercise such as brisk walking/running at least 3-5x a week. Verbal and written instructions (see AVS) provided.  Patient expresses understanding of diagnosis and treatment plan.     Josué Abbasi MD

## 2018-09-06 NOTE — PROGRESS NOTES
[facility] Immunization/s administered 9/6/2018 by Rafy Lora with patients consent. Patient tolerated procedure well. No reactions noted. Vis given.

## 2018-10-24 DIAGNOSIS — M79.7 FIBROMYALGIA: ICD-10-CM

## 2018-10-25 ENCOUNTER — DOCUMENTATION ONLY (OUTPATIENT)
Dept: FAMILY MEDICINE CLINIC | Age: 63
End: 2018-10-25

## 2018-10-25 RX ORDER — PREGABALIN 75 MG/1
CAPSULE ORAL
Qty: 60 CAP | Refills: 3 | Status: SHIPPED | OUTPATIENT
Start: 2018-10-25 | End: 2019-05-05 | Stop reason: SDUPTHER

## 2018-10-29 ENCOUNTER — TELEPHONE (OUTPATIENT)
Dept: FAMILY MEDICINE CLINIC | Age: 63
End: 2018-10-29

## 2018-10-29 NOTE — TELEPHONE ENCOUNTER
Notified by pharmacy that Lyrica is no longer on patient's formulary. Has not tried Cymbalta. We will need to discuss this.   Patient to either pay for the Lyrica and/or come in to discuss alternatives

## 2018-11-01 ENCOUNTER — DOCUMENTATION ONLY (OUTPATIENT)
Dept: FAMILY MEDICINE CLINIC | Age: 63
End: 2018-11-01

## 2018-11-01 NOTE — PROGRESS NOTES
Key: [id number] - PA Case ID: [id number] - Rx #: [id number]   Outcome  Approved today  Questionnaire submitted. PA Case [id number] Status: Approved. Authorization and Notifications Completed. Drug  Lyrica 75MG OR [facility]  [address] called,spoke with Gracy Hernandez stated we do not have a script on profile,please submit if this is preferred pharmacy. Please follow up. Thanks

## 2018-11-01 NOTE — PROGRESS NOTES
Called Ms. Natalio Delatorre and she left the Rx with [facility] and they are requesting a prior auth.  It isn't KWP at this point

## 2018-11-01 NOTE — PROGRESS NOTES
Let Ms Scottie Auguste know the Rx went through for 80 dollar co-pay.  She voiced understanding and said she could pay for it

## 2018-12-27 DIAGNOSIS — G47.00 INSOMNIA, UNSPECIFIED TYPE: ICD-10-CM

## 2018-12-27 DIAGNOSIS — M79.7 FIBROMYALGIA: ICD-10-CM

## 2018-12-27 RX ORDER — TRAZODONE HYDROCHLORIDE 50 MG/1
TABLET ORAL
Qty: 30 TAB | Refills: 1 | Status: SHIPPED | OUTPATIENT
Start: 2018-12-27 | End: 2019-04-25 | Stop reason: SDUPTHER

## 2019-02-28 ENCOUNTER — HOSPITAL ENCOUNTER (OUTPATIENT)
Dept: LAB | Age: 64
Discharge: HOME | End: 2019-02-28

## 2019-02-28 ENCOUNTER — OFFICE VISIT (OUTPATIENT)
Dept: DERMATOLOGY | Facility: AMBULATORY SURGERY CENTER | Age: 64
End: 2019-02-28

## 2019-02-28 VITALS
OXYGEN SATURATION: 96 % | RESPIRATION RATE: 16 BRPM | TEMPERATURE: 98.4 F | BODY MASS INDEX: 26.13 KG/M2 | SYSTOLIC BLOOD PRESSURE: 150 MMHG | WEIGHT: 142 LBS | HEART RATE: 88 BPM | HEIGHT: 62 IN | DIASTOLIC BLOOD PRESSURE: 78 MMHG

## 2019-02-28 DIAGNOSIS — L81.4 LENTIGINES: ICD-10-CM

## 2019-02-28 DIAGNOSIS — D48.5 NEOPLASM OF UNCERTAIN BEHAVIOR OF SKIN OF CHEEK: Primary | ICD-10-CM

## 2019-02-28 DIAGNOSIS — L11.1 GROVER'S DISEASE: ICD-10-CM

## 2019-02-28 DIAGNOSIS — L82.1 SEBORRHEIC KERATOSES: ICD-10-CM

## 2019-02-28 DIAGNOSIS — L57.0 ACTINIC KERATOSES: ICD-10-CM

## 2019-02-28 DIAGNOSIS — D18.01 CHERRY ANGIOMA: ICD-10-CM

## 2019-02-28 DIAGNOSIS — Z80.8 FAMILY HISTORY OF MALIGNANT MELANOMA: ICD-10-CM

## 2019-02-28 DIAGNOSIS — D48.7 NEOPLASM OF UNCERTAIN BEHAVIOR OF EYE: ICD-10-CM

## 2019-02-28 DIAGNOSIS — D22.9 MULTIPLE BENIGN NEVI: ICD-10-CM

## 2019-02-28 DIAGNOSIS — L81.3 CAFÉ AU LAIT SPOT: ICD-10-CM

## 2019-02-28 LAB — SPECIMEN SENT TO LAB,INSX: NORMAL

## 2019-02-28 NOTE — PATIENT INSTRUCTIONS
Chief Complaint   Patient presents with    Skin Exam     Areas of concern: spots on back, bilateral thighs, Right lower eyelid     Self Skin Exam and Sunscreens    Early detection and treatment is essential in the treatment of all forms of skin cancer. If caught early, all forms of skin cancer are curable. In addition to your regular visits, you should perform a monthly skin examination. Over time, you become familiar with what is normally found on your skin and can identify new or suspicious spots. One of the screening tools you can use to assess your skin is to follow the ABCDEs:    A= Asymmetry (One half is unlike the other half)     B= Border (An irregular, scalloped or poorly defined edge)    C= Color (Is varied from one area to another, has shades of tan, brown/ black, white, red or blue)    D= Diameter (Spots larger than 6mm or a pencil eraser)    E= Evolving (New spots or one that is changing in size, shape, or color)    A follow- up interval will be customized based on your history of skin cancer or level of skin damage and risk factors. In any case, if you notice a suspicious or new spot, an appointment should be arranged between regular visits. Everyone should use sunscreen and sun-safe practices, which is especially important for those with a personal or family history of skin cancer. Suggestions for this include:    1. Use daily moisturizers containing SPF 30 or higher. 2. Wear long sleeve clothing with UPF ratings and a broad-brimmed hat. 3. Apply sunscreen with SPF 30 or higher to all sun exposed areas if you are going to be in the sun. A broad spectrum UVA/ UVB sunscreen is best.  Dont forget to REAPPLY every two hours or more often if swimming or sweating! 4. Avoid outside activities during peak sun hours, especially in the summer (10am- 2pm). 5. DO NOT use tanning beds.     Using sunscreen and sun-safe practices can help reduce the likelihood of developing skin cancer or additional skin cancers in those previously diagnosed.
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
## 2019-03-12 LAB
DX ICD CODE: NORMAL
PATH REPORT.FINAL DX SPEC: NORMAL
PATH REPORT.GROSS SPEC: NORMAL
PATH REPORT.MICROSCOPIC SPEC OTHER STN: NORMAL
PATH REPORT.RELEVANT HX SPEC: NORMAL
PATH REPORT.SITE OF ORIGIN SPEC: NORMAL
PATHOLOGIST NAME: NORMAL
PAYMENT PROCEDURE: NORMAL

## 2019-03-12 NOTE — PROGRESS NOTES
I spoke with the patient and she is doing well. She is aware of the diagnosis of AK and lentigo. I suggest cryotherapy to ensure resolution. She will return next month while bringing her mother to her appt. For this treatment.

## 2019-04-02 RX ORDER — METOPROLOL SUCCINATE 25 MG/1
TABLET, EXTENDED RELEASE ORAL
Qty: 30 TAB | Refills: 6 | OUTPATIENT
Start: 2019-04-02

## 2019-04-02 RX ORDER — METOPROLOL SUCCINATE 25 MG/1
TABLET, EXTENDED RELEASE ORAL
Qty: 24 TAB | Refills: 0 | Status: SHIPPED | OUTPATIENT
Start: 2019-04-02 | End: 2019-05-28 | Stop reason: SDUPTHER

## 2019-04-02 NOTE — TELEPHONE ENCOUNTER
Last visit 3/21/17-needs an appt      Called pt and left message to call me back. Pt returned my call,verified pt with two pt identifiers, advised her she is overdue for her appt. Advised I would have our  contact and set up the appt and then I can send in enough medication to last until her appt. Pt verbalized understanding. Message   Received: Today   Message Contents   Jayson GARZA LPN   Caller: Unspecified (Today,  9:31 AM)             Pt is scheduled for 4/23        Noted. Sent to NP for approval and to send to pharmacy.

## 2019-04-25 DIAGNOSIS — G47.00 INSOMNIA, UNSPECIFIED TYPE: ICD-10-CM

## 2019-04-25 DIAGNOSIS — M79.7 FIBROMYALGIA: ICD-10-CM

## 2019-04-26 RX ORDER — TRAZODONE HYDROCHLORIDE 50 MG/1
TABLET ORAL
Qty: 30 TAB | Refills: 1 | Status: SHIPPED | OUTPATIENT
Start: 2019-04-26 | End: 2019-09-03 | Stop reason: SDUPTHER

## 2019-05-05 DIAGNOSIS — M79.7 FIBROMYALGIA: ICD-10-CM

## 2019-05-06 ENCOUNTER — DOCUMENTATION ONLY (OUTPATIENT)
Dept: FAMILY MEDICINE CLINIC | Age: 64
End: 2019-05-06

## 2019-05-06 RX ORDER — PREGABALIN 75 MG/1
CAPSULE ORAL
Qty: 60 CAP | Refills: 0 | Status: SHIPPED | OUTPATIENT
Start: 2019-05-06 | End: 2019-07-08 | Stop reason: SDUPTHER

## 2019-05-06 NOTE — PROGRESS NOTES
LYRICA 75 mg capsule          Sig: TAKE ONE CAPSULE BY MOUTH TWICE A DAY     [facility] per Dr. Dede [place]. Trudy Bamberger

## 2019-05-13 ENCOUNTER — OFFICE VISIT (OUTPATIENT)
Dept: CARDIOLOGY CLINIC | Age: 64
End: 2019-05-13

## 2019-05-13 VITALS
OXYGEN SATURATION: 98 % | DIASTOLIC BLOOD PRESSURE: 78 MMHG | BODY MASS INDEX: 26.61 KG/M2 | RESPIRATION RATE: 16 BRPM | WEIGHT: 144.6 LBS | SYSTOLIC BLOOD PRESSURE: 140 MMHG | HEIGHT: 62 IN | HEART RATE: 75 BPM

## 2019-05-13 DIAGNOSIS — I83.893 VARICOSE VEINS OF BOTH LEGS WITH EDEMA: ICD-10-CM

## 2019-05-13 DIAGNOSIS — R53.83 FATIGUE, UNSPECIFIED TYPE: ICD-10-CM

## 2019-05-13 DIAGNOSIS — I87.2 VENOUS INSUFFICIENCY OF BOTH LOWER EXTREMITIES: ICD-10-CM

## 2019-05-13 DIAGNOSIS — R07.89 CHEST TIGHTNESS OR PRESSURE: Primary | ICD-10-CM

## 2019-05-13 DIAGNOSIS — Z72.0 NICOTINE ABUSE: ICD-10-CM

## 2019-05-13 RX ORDER — CEPHALEXIN 500 MG/1
CAPSULE ORAL
COMMUNITY
End: 2019-05-13 | Stop reason: ALTCHOICE

## 2019-05-13 RX ORDER — AMOXICILLIN 875 MG/1
TABLET, FILM COATED ORAL
COMMUNITY
End: 2019-05-13 | Stop reason: ALTCHOICE

## 2019-05-13 RX ORDER — DOXYCYCLINE 100 MG/1
CAPSULE ORAL
COMMUNITY
End: 2020-05-27 | Stop reason: ALTCHOICE

## 2019-05-13 RX ORDER — BENZONATATE 200 MG/1
CAPSULE ORAL
COMMUNITY
End: 2019-05-13 | Stop reason: ALTCHOICE

## 2019-05-13 RX ORDER — SULFAMETHOXAZOLE AND TRIMETHOPRIM 800; 160 MG/1; MG/1
TABLET ORAL
COMMUNITY
End: 2019-05-13 | Stop reason: ALTCHOICE

## 2019-05-13 RX ORDER — IPRATROPIUM BROMIDE AND ALBUTEROL SULFATE 2.5; .5 MG/3ML; MG/3ML
SOLUTION RESPIRATORY (INHALATION)
COMMUNITY
End: 2021-08-23

## 2019-05-13 RX ORDER — ALBUTEROL SULFATE 90 UG/1
AEROSOL, METERED RESPIRATORY (INHALATION)
COMMUNITY
End: 2021-08-23

## 2019-05-13 RX ORDER — PREDNISONE 20 MG/1
TABLET ORAL
COMMUNITY
End: 2019-05-13 | Stop reason: ALTCHOICE

## 2019-05-13 RX ORDER — CODEINE PHOSPHATE AND GUAIFENESIN 10; 100 MG/5ML; MG/5ML
SOLUTION ORAL
COMMUNITY
End: 2020-05-27 | Stop reason: ALTCHOICE

## 2019-05-13 RX ORDER — PREDNISONE 50 MG/1
TABLET ORAL
COMMUNITY
End: 2019-05-13 | Stop reason: ALTCHOICE

## 2019-05-13 RX ORDER — MELATONIN 5 MG
10 CAPSULE ORAL
COMMUNITY

## 2019-05-13 NOTE — PROGRESS NOTES
Layla Chavira DNP, ANP-BC  Subjective/HPI:     Abiel Hagen is a 61 y.o. female is here for routine follow-up and symptom based appointment. 1.  Patient reports rare intermitten dyspnea on exertion, 1 pack a day smoker. She reports with the recent passing of her mother, increasing stress she has noticed left anterior chest pressure that can occur with or without exertional activities. 2.  Patient reports bilateral lower extremity swelling right greater than left, previous PAD work-up negative, has venous reflux diagnosis. 3.  Patient reporting increasing levels of fatigue and generalized weakness. Present for several months, she is using 3 different types of sleep aids to help her stay asleep and fall asleep. She reports she will wake herself up from snoring.     PCP Provider  Catrachito Ayala MD  Past Medical History:   Diagnosis Date    Breast cancer [facility]) 2002    right breast    Family history of skin cancer     Sister - melanoma on shoulder - removed    Gastrointestinal disorder     proctitis    Other ill-defined conditions(799.89)     hereniated disc neck & lower spine    Other ill-defined conditions(799.89)     fibromyalgia    Other ill-defined conditions(799.89)     pleurisy    Sun-damaged skin     Sunburn, blistering     Tanning bed exposure       Past Surgical History:   Procedure Laterality Date    BREAST SURGERY PROCEDURE UNLISTED      breast cancer    HX BREAST LUMPECTOMY Right 2002    cancer    HX GYN  [id number]    c sections    HX HYSTERECTOMY      HX ORTHOPAEDIC      left foot sx    HX OTHER SURGICAL       Allergies   Allergen Reactions    Ciprofloxacin Other (comments)    Naproxen Unknown (comments)     Bleeding in stomach      Nsaids (Non-Steroidal Anti-Inflammatory Drug) Other (comments)      Family History   Problem Relation Age of Onset    Heart Disease Mother     Heart Surgery Mother     Liver Disease Father     Other Sister         neuroma    Other Other muscular dystrophy    Coronary Artery Disease Maternal Grandfather     Heart Attack Maternal Grandfather     Breast Cancer Paternal Aunt       Current Outpatient Medications   Medication Sig    melatonin 5 mg cap capsule Take 5 mg by mouth nightly.  LYRICA 75 mg capsule TAKE ONE CAPSULE BY MOUTH TWICE A DAY (Patient taking differently: TAKE ONE CAPSULE BY MOUTH DAILY)    traZODone (DESYREL) 50 mg tablet TAKE ONE-HALF (1/2) TABLET BY MOUTH NIGHTLY.  metoprolol succinate (TOPROL-XL) 25 mg XL tablet TAKE 1 TABLET BY MOUTH NIGHTLY. (Patient taking differently: Take 12.5 mg by mouth daily. TAKE 1 TABLET BY MOUTH NIGHTLY.)    tiZANidine (ZANAFLEX) 4 mg tablet TAKE 1 TABLET BY MOUTH THREE TIMES DAILY AS NEEDED FOR FIBROMYALGIA SYMPTOMS    ACETAMINOPHEN/DIPHENHYDRAMINE (TYLENOL PM PO) Take  by mouth nightly.  acetaminophen (TYLENOL EXTRA STRENGTH) 500 mg tablet Take  by mouth every six (6) hours as needed for Pain.  albuterol (PROVENTIL HFA, VENTOLIN HFA, PROAIR HFA) 90 mcg/actuation inhaler Ventolin HFA 90 mcg/actuation aerosol inhaler    albuterol-ipratropium (DUO-NEB) 2.5 mg-0.5 mg/3 ml nebu ipratropium-albuterol 0.5 mg-3 mg(2.5 mg base)/3 mL nebulization soln   Give 3 ml neb treatment now    metoprolol lea/hydrochlorothiaz (DUTOPROL PO) metoprolol lea-hydrochlorothiaz    guaiFENesin-codeine (ROBITUSSIN AC) 100-10 mg/5 mL solution codeine 10 mg-guaifenesin 100 mg/5 mL oral liquid    doxycycline (VIBRAMYCIN) 100 mg capsule doxycycline hyclate 100 mg capsule     No current facility-administered medications for this visit.        Vitals:    05/13/19 1123 05/13/19 1141   BP: 130/70 140/78   Pulse: 75    Resp: 16    SpO2: 98%    Weight: 144 lb 9.6 oz (65.6 kg)    Height: 5' 2\" (1.575 m)      Social History     Socioeconomic History    Marital status:      Spouse name: Not on file    Number of children: Not on file    Years of education: Not on file    Highest education level: Not on file Occupational History    Not on file   Social Needs    Financial resource strain: Not on file    Food insecurity:     Worry: Not on file     Inability: Not on file    Transportation needs:     Medical: Not on file     Non-medical: Not on file   Tobacco Use    Smoking status: Current Every Day Smoker     Packs/day: 1.00     Types: Cigarettes     Last attempt to quit: 9/10/2010     Years since quittin.6    Smokeless tobacco: Never Used   Substance and Sexual Activity    Alcohol use: Yes     Alcohol/week: 1.5 oz     Types: 3 Glasses of wine per week    Drug use: No    Sexual activity: Not on file   Lifestyle    Physical activity:     Days per week: Not on file     Minutes per session: Not on file    Stress: Not on file   Relationships    Social connections:     Talks on phone: Not on file     Gets together: Not on file     Attends [employer] service: Not on file     Active member of club or organization: Not on file     Attends meetings of clubs or organizations: Not on file     Relationship status: Not on file    Intimate partner violence:     Fear of current or ex partner: Not on file     Emotionally abused: Not on file     Physically abused: Not on file     Forced sexual activity: Not on file   Other Topics Concern    Not on file   Social History Narrative    Not on file       I have reviewed the nurses notes, vitals, problem list, allergy list, medical history, family, social history and medications. Review of Symptoms:    General: Pt denies excessive weight gain or loss. Pt is able to conduct ADL's  HEENT: Denies blurred vision, headaches, epistaxis and difficulty swallowing. Respiratory: Denies shortness of breath, + rare intermittent HEALY, denies wheezing or stridor.   Cardiovascular: Denies precordial pain, palpitations, edema or PND  Gastrointestinal: Denies poor appetite, indigestion, abdominal pain or blood in stool  Musculoskeletal: Denies pain or swelling from muscles or joints  Neurologic: Denies tremor, paresthesias, or sensory motor disturbance  Skin: Denies rash, itching or texture change. Physical Exam:      General: Well developed, in no acute distress, cooperative and alert  HEENT: No carotid bruits, no JVD, trach is midline. Neck Supple, PEERL, EOM intact. Heart:  Normal S1/S2 negative S3 or S4. Regular, no murmur, gallop or rub.   Respiratory: Clear bilaterally x 4, no wheezing or rales  Abdomen:   Soft, non-tender, no masses, bowel sounds are active.   Extremities:  No edema, normal cap refill, no cyanosis, atraumatic. Neuro: A&Ox3, speech clear, gait stable. Skin: Skin color is normal. No rashes or lesions.  Non diaphoretic  Vascular: 2+ pulses symmetric in all extremities    Cardiographics    ECG: Sinus rhythm  Results for orders placed or performed during the hospital encounter of 04/25/12   EKG, 12 LEAD, INITIAL   Result Value Ref Range    Ventricular Rate 82 BPM    Atrial Rate 82 BPM    P-R Interval 122 ms    QRS Duration 94 ms    Q-T Interval 344 ms    QTC Calculation (Bezet) 401 ms    Calculated P Axis 67 degrees    Calculated R Axis 64 degrees    Calculated T Axis 35 degrees    Diagnosis       Normal sinus rhythm  Possible Left atrial enlargement  No previous ECGs available  Confirmed by [facility] ([id number]) on 4/26/2012 10:48:57 AM         Cardiology Labs:  Lab Results   Component Value Date/Time    Cholesterol, total 192 08/31/2018 12:15 PM    HDL Cholesterol 77 08/31/2018 12:15 PM    LDL, calculated 96 08/31/2018 12:15 PM    Triglyceride 93 08/31/2018 12:15 PM       Lab Results   Component Value Date/Time    Sodium 138 08/31/2018 12:15 PM    Potassium 4.7 08/31/2018 12:15 PM    Chloride 98 08/31/2018 12:15 PM    CO2 23 08/31/2018 12:15 PM    Anion gap 6 04/25/2012 04:45 PM    Glucose 89 08/31/2018 12:15 PM    BUN 18 08/31/2018 12:15 PM    Creatinine 0.90 08/31/2018 12:15 PM    BUN/Creatinine ratio 20 08/31/2018 12:15 PM    GFR est AA 79 08/31/2018 12:15 PM    GFR est non-AA 68 08/31/2018 12:15 PM    Calcium 9.5 08/31/2018 12:15 PM    Bilirubin, total 0.3 08/31/2018 12:15 PM    AST (SGOT) 29 08/31/2018 12:15 PM    Alk. phosphatase 65 08/31/2018 12:15 PM    Protein, total 6.8 08/31/2018 12:15 PM    Albumin 4.6 08/31/2018 12:15 PM    Globulin 3.4 04/25/2012 04:45 PM    A-G Ratio 2.1 08/31/2018 12:15 PM    ALT (SGPT) 16 08/31/2018 12:15 PM           Assessment:     Assessment:     Diagnoses and all orders for this visit:    1. Chest tightness or pressure  -     ECHO STRESS; Future  -     METABOLIC PANEL, COMPREHENSIVE  -     CBC WITH AUTOMATED DIFF  -     LIPID PANEL  -     TSH 3RD GENERATION  -     T4 (THYROXINE)  -     VITAMIN B12 & FOLATE  -     VITAMIN D, 25 HYDROXY; Future    2. Venous insufficiency of both lower extremities  -     AMB POC EKG ROUTINE W/ 12 LEADS, INTER & REP    3. Varicose veins of both legs with edema    4. Nicotine abuse  -     ECHO STRESS; Future    5. Fatigue, unspecified type  -     ECHO STRESS; Future  -     METABOLIC PANEL, COMPREHENSIVE  -     CBC WITH AUTOMATED DIFF  -     LIPID PANEL  -     TSH 3RD GENERATION  -     T4 (THYROXINE)  -     VITAMIN B12 & FOLATE  -     VITAMIN D, 25 HYDROXY; Future  -     REFERRAL TO SLEEP STUDIES        ICD-10-CM ICD-9-CM    1. Chest tightness or pressure R07.89 786.59 ECHO STRESS      METABOLIC PANEL, COMPREHENSIVE      CBC WITH AUTOMATED DIFF      LIPID PANEL      TSH 3RD GENERATION      T4 (THYROXINE)      VITAMIN B12 & FOLATE      VITAMIN D, 25 HYDROXY   2. Venous insufficiency of both lower extremities I87.2 459.81 AMB POC EKG ROUTINE W/ 12 LEADS, INTER & REP   3. Varicose veins of both legs with edema I83.893 454.8    4. Nicotine abuse Z72.0 305.1 ECHO STRESS   5.  Fatigue, unspecified type R53.83 780.79 ECHO STRESS      METABOLIC PANEL, COMPREHENSIVE      CBC WITH AUTOMATED DIFF      LIPID PANEL      TSH 3RD GENERATION      T4 (THYROXINE)      VITAMIN B12 & FOLATE      VITAMIN D, 25 HYDROXY REFERRAL TO SLEEP STUDIES     Orders Placed This Encounter    METABOLIC PANEL, COMPREHENSIVE    CBC WITH AUTOMATED DIFF    LIPID PANEL    TSH 3RD GENERATION    T4 (THYROXINE)    VITAMIN B12 & FOLATE    VITAMIN D, 25 HYDROXY     Standing Status:   Future     Standing Expiration Date:   5/13/2020    REFERRAL TO SLEEP STUDIES     Referral Priority:   Routine     Referral Type:   Consultation     Referral Reason:   Specialty Services Required     Referred to Provider:   Ana Maria Mitchell MD     Requested Specialty:   Sleep Medicine     Number of Visits Requested:   1    AMB POC EKG ROUTINE W/ 12 LEADS, INTER & REP     Order Specific Question:   Reason for Exam:     Answer:   Routine    DISCONTD: trimethoprim-sulfamethoxazole (BACTRIM DS, SEPTRA DS) 160-800 mg per tablet     Sig: sulfamethoxazole 800 mg-trimethoprim 160 mg tablet    DISCONTD: benzonatate (TESSALON) 200 mg capsule     Sig: benzonatate 200 mg capsule    albuterol (PROVENTIL HFA, VENTOLIN HFA, PROAIR HFA) 90 mcg/actuation inhaler     Sig: Ventolin HFA 90 mcg/actuation aerosol inhaler    albuterol-ipratropium (DUO-NEB) 2.5 mg-0.5 mg/3 ml nebu     Sig: ipratropium-albuterol 0.5 mg-3 mg(2.5 mg base)/3 mL nebulization soln   Give 3 ml neb treatment now    metoprolol lea/hydrochlorothiaz (DUTOPROL PO)     Sig: metoprolol lea-hydrochlorothiaz    DISCONTD: cephALEXin (KEFLEX) 500 mg capsule     Sig: cephalexin 500 mg capsule    DISCONTD: predniSONE (DELTASONE) 20 mg tablet     Sig: prednisone 20 mg tablet    DISCONTD: predniSONE (DELTASONE) 50 mg tablet     Sig: prednisone 50 mg tablet    guaiFENesin-codeine (ROBITUSSIN AC) 100-10 mg/5 mL solution     Sig: codeine 10 mg-guaifenesin 100 mg/5 mL oral liquid    DISCONTD: amoxicillin (AMOXIL) 875 mg tablet     Sig: amoxicillin 875 mg tablet    doxycycline (VIBRAMYCIN) 100 mg capsule     Sig: doxycycline hyclate 100 mg capsule    melatonin 5 mg cap capsule     Sig: Take 5 mg by mouth nightly. Plan:     1. Left anterior chest discomfort: Cardiac risk factors include postmenopausal female with family history of heart disease, elevated blood pressure and smoker. Will evaluate with stress echocardiogram.  2.  Tobacco abuse: Counseled patient greater than 5 minutes in tobacco cessation, agreed upon trialing 2 mg nicotine lozenge. 3.  Fatigue: Multifactorial, will check sleep study, battery of labs and advised to follow-up with primary care for further evaluation. We will follow-up with patient once labs and stress testing complete    Martha Corona NP    This note was created using voice recognition software. Despite editing, there may be syntax errors. Patient seen and examined by me with nurse practitioner. Freeman Maxwell personally performed all components of the history, physical, and medical decision making and agree with the assessment and plan with minor modifications as noted. Will address it LE venous insufficiency after taking care of above. Previously did not get preauth from insurance.      Reji Munoz MD

## 2019-05-13 NOTE — PROGRESS NOTES
1. Have you been to the ER, urgent care clinic since your last visit? Hospitalized since your last visit? Yes When: [facility] for a cold on 9/2018    2. Have you seen or consulted any other health care providers outside of the [address] [place] [address] since your last visit? Include any pap smears or colon screening.  No    Chief Complaint   Patient presents with    Other     poor circulation to both legs; follow up; last seen 2017    Shortness of Breath     patient reports on and off chest tightness x 2 mos; denies sob

## 2019-05-14 LAB
ALBUMIN SERPL-MCNC: 4.7 G/DL (ref 3.6–4.8)
ALBUMIN/GLOB SERPL: 2.2 {RATIO} (ref 1.2–2.2)
ALP SERPL-CCNC: 61 IU/L (ref 39–117)
ALT SERPL-CCNC: 15 IU/L (ref 0–32)
AST SERPL-CCNC: 25 IU/L (ref 0–40)
BASOPHILS # BLD AUTO: 0 X10E3/UL (ref 0–0.2)
BASOPHILS NFR BLD AUTO: 0 %
BILIRUB SERPL-MCNC: 0.5 MG/DL (ref 0–1.2)
BUN SERPL-MCNC: 14 MG/DL (ref 8–27)
BUN/CREAT SERPL: 25 (ref 12–28)
CALCIUM SERPL-MCNC: 9.6 MG/DL (ref 8.7–10.3)
CHLORIDE SERPL-SCNC: 103 MMOL/L (ref 96–106)
CHOLEST SERPL-MCNC: 207 MG/DL (ref 100–199)
CO2 SERPL-SCNC: 25 MMOL/L (ref 20–29)
CREAT SERPL-MCNC: 0.55 MG/DL (ref 0.57–1)
EOSINOPHIL # BLD AUTO: 0 X10E3/UL (ref 0–0.4)
EOSINOPHIL NFR BLD AUTO: 0 %
ERYTHROCYTE [DISTWIDTH] IN BLOOD BY AUTOMATED COUNT: 13.2 % (ref 12.3–15.4)
FOLATE SERPL-MCNC: 9.5 NG/ML
GLOBULIN SER CALC-MCNC: 2.1 G/DL (ref 1.5–4.5)
GLUCOSE SERPL-MCNC: 90 MG/DL (ref 65–99)
HCT VFR BLD AUTO: 38.7 % (ref 34–46.6)
HDLC SERPL-MCNC: 81 MG/DL
HGB BLD-MCNC: 12.9 G/DL (ref 11.1–15.9)
IMM GRANULOCYTES # BLD AUTO: 0 X10E3/UL (ref 0–0.1)
IMM GRANULOCYTES NFR BLD AUTO: 0 %
INTERPRETATION, 910389: NORMAL
LDLC SERPL CALC-MCNC: 107 MG/DL (ref 0–99)
LYMPHOCYTES # BLD AUTO: 2.5 X10E3/UL (ref 0.7–3.1)
LYMPHOCYTES NFR BLD AUTO: 35 %
MCH RBC QN AUTO: 32.5 PG (ref 26.6–33)
MCHC RBC AUTO-ENTMCNC: 33.3 G/DL (ref 31.5–35.7)
MCV RBC AUTO: 98 FL (ref 79–97)
MONOCYTES # BLD AUTO: 0.4 X10E3/UL (ref 0.1–0.9)
MONOCYTES NFR BLD AUTO: 5 %
NEUTROPHILS # BLD AUTO: 4.2 X10E3/UL (ref 1.4–7)
NEUTROPHILS NFR BLD AUTO: 60 %
PLATELET # BLD AUTO: 179 X10E3/UL (ref 150–379)
POTASSIUM SERPL-SCNC: 4.1 MMOL/L (ref 3.5–5.2)
PROT SERPL-MCNC: 6.8 G/DL (ref 6–8.5)
RBC # BLD AUTO: 3.97 X10E6/UL (ref 3.77–5.28)
SODIUM SERPL-SCNC: 142 MMOL/L (ref 134–144)
T4 SERPL-MCNC: 6.3 UG/DL (ref 4.5–12)
TRIGL SERPL-MCNC: 93 MG/DL (ref 0–149)
TSH SERPL DL<=0.005 MIU/L-ACNC: 0.76 UIU/ML (ref 0.45–4.5)
VIT B12 SERPL-MCNC: 436 PG/ML (ref 232–1245)
VLDLC SERPL CALC-MCNC: 19 MG/DL (ref 5–40)
WBC # BLD AUTO: 7.1 X10E3/UL (ref 3.4–10.8)

## 2019-05-14 NOTE — PROGRESS NOTES
[place]: Please call patient lab work looks good other than very mildly elevated cholesterol, most likely was not fasting as it was collected and 2:00 in the afternoon. Her thyroid and electrolytes look good, still waiting for her vitamin D level her vitamin D B12 is in the low normal range, she would benefit from an over-the-counter B12 supplement to help increase her energy.

## 2019-05-16 ENCOUNTER — TELEPHONE (OUTPATIENT)
Dept: CARDIOLOGY CLINIC | Age: 64
End: 2019-05-16

## 2019-05-16 NOTE — TELEPHONE ENCOUNTER
----- Message from Layla Chavira NP sent at 5/14/2019  9:09 AM EDT -----  [place]: Please call patient lab work looks good other than very mildly elevated cholesterol, most likely was not fasting as it was collected and 2:00 in the afternoon. Her thyroid and electrolytes look good, still waiting for her vitamin D level her vitamin D B12 is in the low normal range, she would benefit from an over-the-counter B12 supplement to help increase her energy.

## 2019-05-20 ENCOUNTER — TELEPHONE (OUTPATIENT)
Dept: FAMILY MEDICINE CLINIC | Age: 64
End: 2019-05-20

## 2019-05-20 DIAGNOSIS — R53.83 FATIGUE, UNSPECIFIED TYPE: ICD-10-CM

## 2019-05-20 DIAGNOSIS — R07.89 CHEST TIGHTNESS OR PRESSURE: ICD-10-CM

## 2019-05-20 NOTE — TELEPHONE ENCOUNTER
From [place]:    Pt requesting a sooner appointment than what is available due to swollen uvula in throat. Also, pt stated she would like to be seen between 05/20/19- 05/22/19. Best contact:[phone].

## 2019-05-28 ENCOUNTER — TELEPHONE (OUTPATIENT)
Dept: CARDIOLOGY CLINIC | Age: 64
End: 2019-05-28

## 2019-05-28 NOTE — TELEPHONE ENCOUNTER
metoprolol succinate (TOPROL-XL) 25 mg XL tablet, pt is requesting to have this medication sent to [facility] pharmacy in [place] on [address]      thanks

## 2019-05-29 ENCOUNTER — OFFICE VISIT (OUTPATIENT)
Dept: FAMILY MEDICINE CLINIC | Age: 64
End: 2019-05-29

## 2019-05-29 VITALS
TEMPERATURE: 98.7 F | DIASTOLIC BLOOD PRESSURE: 83 MMHG | SYSTOLIC BLOOD PRESSURE: 130 MMHG | OXYGEN SATURATION: 95 % | HEART RATE: 87 BPM | BODY MASS INDEX: 26.68 KG/M2 | HEIGHT: 62 IN | RESPIRATION RATE: 17 BRPM | WEIGHT: 145 LBS

## 2019-05-29 DIAGNOSIS — R10.9 LEFT FLANK PAIN: ICD-10-CM

## 2019-05-29 DIAGNOSIS — R31.9 HEMATURIA, UNSPECIFIED TYPE: Primary | ICD-10-CM

## 2019-05-29 LAB
BILIRUB UR QL STRIP: NEGATIVE
GLUCOSE UR-MCNC: NEGATIVE MG/DL
KETONES P FAST UR STRIP-MCNC: NEGATIVE MG/DL
PH UR STRIP: 7.5 [PH] (ref 4.6–8)
PROT UR QL STRIP: NEGATIVE
SP GR UR STRIP: 1.01 (ref 1–1.03)
UA UROBILINOGEN AMB POC: NORMAL (ref 0.2–1)
URINALYSIS CLARITY POC: CLEAR
URINALYSIS COLOR POC: YELLOW
URINE BLOOD POC: NORMAL
URINE LEUKOCYTES POC: NORMAL
URINE NITRITES POC: NEGATIVE

## 2019-05-29 RX ORDER — METOPROLOL SUCCINATE 25 MG/1
TABLET, EXTENDED RELEASE ORAL
Qty: 30 TAB | Refills: 3 | Status: SHIPPED | OUTPATIENT
Start: 2019-05-29 | End: 2019-07-30 | Stop reason: SDUPTHER

## 2019-05-29 NOTE — PROGRESS NOTES
Chief Complaint   Patient presents with    Blood in Urine     follow-up    Flank Pain    Shortness of Breath     Health Maintenance reviewed     1. Have you been to the ER, urgent care clinic since your last visit? Hospitalized since your last visit? No    2. Have you seen or consulted any other health care providers outside of the [address] [place] [address] since your last visit? Include any pap smears or colon screening.  No

## 2019-05-29 NOTE — PROGRESS NOTES
Chief Complaint   Patient presents with    Blood in Urine     follow-up    Flank Pain    Shortness of Breath     Pt was seen at [facility] one week ago due to sore throat, was given an abx and prednisone. Pt was advised to take allegra D, but stopped after 2 doses due to rapid heartbeat. Pt reports that on a different visit she was seen at [facility] for side pain, was told that she had blood in her urine, but culture was negative. Pt also had an echo stress test yesterday. Pt was started on B12 by her cardiologist.     Subjective: (As above and below)     Chief Complaint   Patient presents with    Blood in Urine     follow-up    Flank Pain    Shortness of Breath     she is a 61y.o. year old female who presents for evaluation. Reviewed PmHx, RxHx, FmHx, SocHx, AllgHx and updated in chart. Review of Systems - negative except as listed above    Objective:     Vitals:    05/29/19 1101   BP: 130/83   Pulse: 87   Resp: 17   Temp: 98.7 °F (37.1 °C)   TempSrc: Oral   SpO2: 95%   Weight: 145 lb (65.8 kg)   Height: 5' 2\" (1.575 m)     Physical Examination: General appearance - alert, well appearing, and in no distress  Mental status - normal mood, behavior, speech, dress, motor activity, and thought processes  Mouth - mucous membranes moist, pharynx normal without lesions  Lymphatics - no palpable lymphadenopathy, no hepatosplenomegaly  Chest - clear to auscultation, no wheezes, rales or rhonchi, symmetric air entry  Abdomen - tenderness noted left flank, no CVA tenderness  Musculoskeletal - no joint tenderness, deformity or swelling  Extremities - peripheral pulses normal, no pedal edema, no clubbing or cyanosis    Assessment/ Plan:   1. Hematuria, unspecified type  -check CT  - AMB POC URINALYSIS DIP STICK AUTO W/O MICRO  - CT ABD PELV WO CONT; Future    2.  Left flank pain  - CT ABD PELV WO CONT; Future       I have discussed the diagnosis with the patient and the intended plan as seen in the above orders. The patient has received an after-visit summary and questions were answered concerning future plans.      Medication Side Effects and Warnings were discussed with patient: yes  Patient Labs were reviewed: yes  Patient Past Records were reviewed:  yes    Mickey Laura M.D.

## 2019-06-13 ENCOUNTER — HOSPITAL ENCOUNTER (OUTPATIENT)
Dept: CT IMAGING | Age: 64
Discharge: HOME | End: 2019-06-13
Attending: FAMILY MEDICINE
Payer: COMMERCIAL

## 2019-06-13 DIAGNOSIS — R31.9 HEMATURIA, UNSPECIFIED TYPE: ICD-10-CM

## 2019-06-13 DIAGNOSIS — R10.9 LEFT FLANK PAIN: ICD-10-CM

## 2019-06-13 PROCEDURE — 74176 CT ABD & PELVIS W/O CONTRAST: CPT

## 2019-07-08 DIAGNOSIS — M79.7 FIBROMYALGIA: ICD-10-CM

## 2019-07-08 RX ORDER — PREGABALIN 75 MG/1
CAPSULE ORAL
Qty: 60 CAP | Refills: 3 | Status: SHIPPED | OUTPATIENT
Start: 2019-07-08 | End: 2020-02-19 | Stop reason: SDUPTHER

## 2019-07-08 NOTE — TELEPHONE ENCOUNTER
Patient calling in for refill to got to [facility].  She says she only has one for tonight left

## 2019-07-30 RX ORDER — METOPROLOL SUCCINATE 25 MG/1
TABLET, EXTENDED RELEASE ORAL
Qty: 30 TAB | Refills: 3 | Status: SHIPPED | OUTPATIENT
Start: 2019-07-30 | End: 2019-12-04 | Stop reason: SDUPTHER

## 2019-09-03 DIAGNOSIS — M79.7 FIBROMYALGIA: ICD-10-CM

## 2019-09-03 DIAGNOSIS — G47.00 INSOMNIA, UNSPECIFIED TYPE: ICD-10-CM

## 2019-09-03 RX ORDER — TRAZODONE HYDROCHLORIDE 50 MG/1
TABLET ORAL
Qty: 30 TAB | Refills: 1 | Status: SHIPPED | OUTPATIENT
Start: 2019-09-03 | End: 2019-12-30

## 2019-09-03 RX ORDER — TIZANIDINE 4 MG/1
TABLET ORAL
Qty: 90 TAB | Refills: 11 | Status: SHIPPED | OUTPATIENT
Start: 2019-09-03 | End: 2020-05-29 | Stop reason: SDUPTHER

## 2019-12-04 RX ORDER — METOPROLOL SUCCINATE 25 MG/1
TABLET, EXTENDED RELEASE ORAL
Qty: 90 TAB | Refills: 1 | Status: SHIPPED | OUTPATIENT
Start: 2019-12-04 | End: 2020-02-19 | Stop reason: SDUPTHER

## 2019-12-28 DIAGNOSIS — M79.7 FIBROMYALGIA: ICD-10-CM

## 2019-12-28 DIAGNOSIS — G47.00 INSOMNIA, UNSPECIFIED TYPE: ICD-10-CM

## 2019-12-30 RX ORDER — TRAZODONE HYDROCHLORIDE 50 MG/1
TABLET ORAL
Qty: 30 TAB | Refills: 1 | Status: SHIPPED | OUTPATIENT
Start: 2019-12-30 | End: 2020-02-19 | Stop reason: SDUPTHER

## 2020-02-18 ENCOUNTER — OFFICE VISIT (OUTPATIENT)
Dept: FAMILY MEDICINE CLINIC | Age: 65
End: 2020-02-18

## 2020-02-18 ENCOUNTER — TELEPHONE (OUTPATIENT)
Dept: FAMILY MEDICINE CLINIC | Age: 65
End: 2020-02-18

## 2020-02-18 VITALS
TEMPERATURE: 99.8 F | OXYGEN SATURATION: 96 % | SYSTOLIC BLOOD PRESSURE: 132 MMHG | DIASTOLIC BLOOD PRESSURE: 7 MMHG | HEART RATE: 102 BPM | RESPIRATION RATE: 16 BRPM | WEIGHT: 144 LBS | BODY MASS INDEX: 26.5 KG/M2 | HEIGHT: 62 IN

## 2020-02-18 DIAGNOSIS — K51.919 ULCERATIVE COLITIS WITH COMPLICATION, UNSPECIFIED LOCATION (HCC): Primary | ICD-10-CM

## 2020-02-18 RX ORDER — PREDNISONE 20 MG/1
40 TABLET ORAL
Qty: 20 TAB | Refills: 0 | Status: SHIPPED | OUTPATIENT
Start: 2020-02-18 | End: 2020-02-18 | Stop reason: SDUPTHER

## 2020-02-18 RX ORDER — PREDNISONE 20 MG/1
40 TABLET ORAL
Qty: 20 TAB | Refills: 0 | Status: SHIPPED | OUTPATIENT
Start: 2020-02-18 | End: 2020-02-28

## 2020-02-18 NOTE — PROGRESS NOTES
Chief Complaint   Patient presents with    Inflammatory Bowel Disease     UC Flare-up     Fibromyalgia     Pt reports that she stopped smoking several months ago, has been having a UC flare for several weeks. Pt is followed by Dr. Shannan Chicas. Pt reports that she is having bleeding with bowel movements, hands are cold. Pt reports that she has abdominal bloating and pain. Subjective: (As above and below)     Chief Complaint   Patient presents with    Inflammatory Bowel Disease     UC Flare-up     Fibromyalgia     she is a 59y.o. year old female who presents for evaluation. Reviewed PmHx, RxHx, FmHx, SocHx, AllgHx and updated in chart. Review of Systems - negative except as listed above    Objective:     Vitals:    02/18/20 1112   BP: (!) 132/7   Pulse: (!) 102   Resp: 16   Temp: 99.8 °F (37.7 °C)   TempSrc: Oral   SpO2: 96%   Weight: 144 lb (65.3 kg)   Height: 5' 2\" (1.575 m)     Physical Examination: General appearance - alert, well appearing, and in no distress  Mental status - normal mood, behavior, speech, dress, motor activity, and thought processes  Mouth - mucous membranes moist, pharynx normal without lesions  Chest - clear to auscultation, no wheezes, rales or rhonchi, symmetric air entry  Heart - normal rate, regular rhythm, normal S1, S2, no murmurs, rubs, clicks or gallops  Abdomen - tenderness noted diffusely, hyperactive bowel sounds  Musculoskeletal - no joint tenderness, deformity or swelling    Assessment/ Plan:   1. Ulcerative colitis with complication, unspecified [facility] ([facility]  -start on medication as written, check labs, follow up with GI as soon as possible  - METABOLIC PANEL, COMPREHENSIVE  - CBC WITH AUTOMATED DIFF  - predniSONE (DELTASONE) 20 mg tablet; Take 40 mg by mouth daily (with breakfast) for 10 days. Dispense: 20 Tab; Refill: 0  - REFERRAL TO GASTROENTEROLOGY       I have discussed the diagnosis with the patient and the intended plan as seen in the above orders. The patient has received an after-visit summary and questions were answered concerning future plans.      Medication Side Effects and Warnings were discussed with patient: yes  Patient Labs were reviewed: yes  Patient Past Records were reviewed:  yes    Kath Ramirez M.D.

## 2020-02-18 NOTE — PROGRESS NOTES
Chief Complaint   Patient presents with    Inflammatory Bowel Disease    Fibromyalgia       1. Have you been to the ER, urgent care clinic since your last visit? Hospitalized since your last visit? No    2. Have you seen or consulted any other health care providers outside of the [facility] since your last visit? Include any pap smears or colon screening.  No    Health Maintenance Due   Topic Date Due    Shingrix Vaccine Age 49> (1 of 2) 06/03/2005    Breast Cancer Screen Mammogram  05/09/2019

## 2020-02-18 NOTE — TELEPHONE ENCOUNTER
Message from Chesapeakerachelle Prescott   Received: Today   Message Contents   A[place], [facility]   Phone Number: [phone]             Caller's first and last name: N/a   Reason for call: Doctor request   Callback required yes/no and why: Yes   Best contact number(s): [phone]   Details to clarify the request: Pt needs prescription that was just sent to [facility] to be sent to [facility].  The pt was not aware that the prescription was going to [facility]. Please send to [facility]

## 2020-02-19 DIAGNOSIS — G47.00 INSOMNIA, UNSPECIFIED TYPE: ICD-10-CM

## 2020-02-19 DIAGNOSIS — M79.7 FIBROMYALGIA: ICD-10-CM

## 2020-02-19 LAB
ALBUMIN SERPL-MCNC: 4.1 G/DL (ref 3.8–4.8)
ALBUMIN/GLOB SERPL: 1.5 {RATIO} (ref 1.2–2.2)
ALP SERPL-CCNC: 73 IU/L (ref 39–117)
ALT SERPL-CCNC: 11 IU/L (ref 0–32)
AST SERPL-CCNC: 19 IU/L (ref 0–40)
BASOPHILS # BLD AUTO: 0.1 X10E3/UL (ref 0–0.2)
BASOPHILS NFR BLD AUTO: 1 %
BILIRUB SERPL-MCNC: 0.3 MG/DL (ref 0–1.2)
BUN SERPL-MCNC: 13 MG/DL (ref 8–27)
BUN/CREAT SERPL: 21 (ref 12–28)
CALCIUM SERPL-MCNC: 8.9 MG/DL (ref 8.7–10.3)
CHLORIDE SERPL-SCNC: 100 MMOL/L (ref 96–106)
CO2 SERPL-SCNC: 20 MMOL/L (ref 20–29)
CREAT SERPL-MCNC: 0.61 MG/DL (ref 0.57–1)
EOSINOPHIL # BLD AUTO: 0.2 X10E3/UL (ref 0–0.4)
EOSINOPHIL NFR BLD AUTO: 1 %
ERYTHROCYTE [DISTWIDTH] IN BLOOD BY AUTOMATED COUNT: 12.5 % (ref 11.7–15.4)
GLOBULIN SER CALC-MCNC: 2.7 G/DL (ref 1.5–4.5)
GLUCOSE SERPL-MCNC: 85 MG/DL (ref 65–99)
HCT VFR BLD AUTO: 38.1 % (ref 34–46.6)
HGB BLD-MCNC: 13 G/DL (ref 11.1–15.9)
IMM GRANULOCYTES # BLD AUTO: 0.1 X10E3/UL (ref 0–0.1)
IMM GRANULOCYTES NFR BLD AUTO: 1 %
LYMPHOCYTES # BLD AUTO: 1.8 X10E3/UL (ref 0.7–3.1)
LYMPHOCYTES NFR BLD AUTO: 15 %
MCH RBC QN AUTO: 32.7 PG (ref 26.6–33)
MCHC RBC AUTO-ENTMCNC: 34.1 G/DL (ref 31.5–35.7)
MCV RBC AUTO: 96 FL (ref 79–97)
MONOCYTES # BLD AUTO: 1.3 X10E3/UL (ref 0.1–0.9)
MONOCYTES NFR BLD AUTO: 11 %
NEUTROPHILS # BLD AUTO: 8.5 X10E3/UL (ref 1.4–7)
NEUTROPHILS NFR BLD AUTO: 71 %
PLATELET # BLD AUTO: 272 X10E3/UL (ref 150–450)
POTASSIUM SERPL-SCNC: 4.5 MMOL/L (ref 3.5–5.2)
PROT SERPL-MCNC: 6.8 G/DL (ref 6–8.5)
RBC # BLD AUTO: 3.97 X10E6/UL (ref 3.77–5.28)
SODIUM SERPL-SCNC: 140 MMOL/L (ref 134–144)
WBC # BLD AUTO: 11.9 X10E3/UL (ref 3.4–10.8)

## 2020-02-19 RX ORDER — TRAZODONE HYDROCHLORIDE 50 MG/1
50 TABLET ORAL
Qty: 90 TAB | Refills: 3 | Status: SHIPPED | OUTPATIENT
Start: 2020-02-19 | End: 2021-02-08

## 2020-02-19 RX ORDER — PREGABALIN 75 MG/1
CAPSULE ORAL
Qty: 180 CAP | Refills: 1 | Status: SHIPPED | OUTPATIENT
Start: 2020-02-19 | End: 2020-08-18 | Stop reason: SDUPTHER

## 2020-02-19 RX ORDER — METOPROLOL SUCCINATE 25 MG/1
25 TABLET, EXTENDED RELEASE ORAL DAILY
Qty: 90 TAB | Refills: 3 | Status: SHIPPED | OUTPATIENT
Start: 2020-02-19 | End: 2020-10-28

## 2020-02-19 NOTE — PROGRESS NOTES
White blood cell count is slightly elevated consistent with current ulcerative colitis flare. Please take medications as written and follow-up with GI as discussed. All other labs are within normal limits.    Please inform

## 2020-05-27 ENCOUNTER — VIRTUAL VISIT (OUTPATIENT)
Dept: FAMILY MEDICINE CLINIC | Age: 65
End: 2020-05-27

## 2020-05-27 VITALS — BODY MASS INDEX: 26.5 KG/M2 | WEIGHT: 144 LBS | HEIGHT: 62 IN

## 2020-05-27 DIAGNOSIS — M24.852 SNAPPING HIP SYNDROME, LEFT: ICD-10-CM

## 2020-05-27 DIAGNOSIS — M25.552 LEFT HIP PAIN: Primary | ICD-10-CM

## 2020-05-27 RX ORDER — BACLOFEN 10 MG/1
10 TABLET ORAL 3 TIMES DAILY
Qty: 270 TAB | Refills: 1 | Status: SHIPPED | OUTPATIENT
Start: 2020-05-27 | End: 2020-08-25

## 2020-05-27 RX ORDER — MESALAMINE 800 MG/1
TABLET, DELAYED RELEASE ORAL
COMMUNITY
Start: 2020-05-22 | End: 2022-02-24

## 2020-05-27 NOTE — PROGRESS NOTES
Chief Complaint   Patient presents with    Hip Pain     left       Health Maintenance reviewed     1. Have you been to the ER, urgent care clinic since your last visit? Hospitalized since your last visit? No     2. Have you seen or consulted any other health care providers outside of the [address] [place] [address] since your last visit? Include any pap smears or colon screening.   No

## 2020-05-27 NOTE — PROGRESS NOTES
Chief Complaint   Patient presents with    Hip Pain     left     Pt was seen via doxy. me video visit. Pt reports that she was diagnosed with snapping hip syndrome a few years ago used to have intermittent pain. Pt reports that for the past several weeks to months it has gotten significantly worse, has been limiting her ability to walk. Pt reports that it has not caused to fall, but several times she has had to sit down or double over. Al Gonzales is a 59 y.o. female who was seen by synchronous (real-time) audio-video technology on 5/27/2020. Consent: Al Gonzales, who was seen by synchronous (real-time) audio-video technology, and/or her healthcare decision maker, is aware that this patient-initiated, Telehealth encounter on 5/27/2020 is a billable service, with coverage as determined by her insurance carrier. She is aware that she may receive a bill and has provided verbal consent to proceed: Yes. Assessment & Plan:   1. Left hip pain  -add baclofen to help with pain, pt has not done well with prednisone in the past  -refer to ortho  - baclofen (LIORESAL) 10 mg tablet; Take 1 Tab by mouth three (3) times daily for 90 days. Dispense: 270 Tab; Refill: 1  - XR HIP LT W OR WO PELV 2-3 VWS; Future  - REFERRAL TO ORTHOPEDICS    2. Snapping hip syndrome, left  -consider PT when able, ortho eval      Subjective:   Al Gonzales is a 59 y.o. female who was seen for Hip Pain (left)      Prior to Admission medications    Medication Sig Start Date End Date Taking? Authorizing Provider   mesalamine DR (ASACOL HD) 800 mg DR tablet TAKE 1 TABLET BY MOUTH 3 TIMES DAILY 5/22/20  Yes Provider, Historical   baclofen (LIORESAL) 10 mg tablet Take 1 Tab by mouth three (3) times daily for 90 days.  5/27/20 8/25/20 Yes Adan Bernal MD   pregabalin (LYRICA) 75 mg capsule TAKE ONE CAPSULE BY MOUTH TWICE A DAY 2/19/20  Yes Adan Bernal MD   traZODone (DESYREL) 50 mg tablet Take 1 Tab by mouth nightly. 2/19/20  Yes Jannette Bernal MD   metoprolol succinate (TOPROL-XL) 25 mg XL tablet Take 1 Tab by mouth daily. 2/19/20  Yes Jannette Bernal MD   tiZANidine (ZANAFLEX) 4 mg tablet TAKE 1 TABLET BY MOUTH THREE TIMES DAILY AS NEEDED FOR FIBROMYALGIA SYMPTOMS 9/3/19  Yes Veronica Hobbs MD   albuterol (PROVENTIL HFA, VENTOLIN HFA, PROAIR HFA) 90 mcg/actuation inhaler Ventolin HFA 90 mcg/actuation aerosol inhaler   Yes Provider, Historical   albuterol-ipratropium (DUO-NEB) 2.5 mg-0.5 mg/3 ml nebu ipratropium-albuterol 0.5 mg-3 mg(2.5 mg base)/3 mL nebulization soln   Give 3 ml neb treatment now   Yes Provider, Historical   melatonin 5 mg cap capsule Take 5 mg by mouth nightly. Yes Provider, Historical   ACETAMINOPHEN/DIPHENHYDRAMINE (TYLENOL PM PO) Take  by mouth nightly. Yes Provider, Historical   acetaminophen (TYLENOL EXTRA STRENGTH) 500 mg tablet Take  by mouth every six (6) hours as needed for Pain.    Yes Provider, Historical   guaiFENesin-codeine (ROBITUSSIN AC) 100-10 mg/5 mL solution codeine 10 mg-guaifenesin 100 mg/5 mL oral liquid  5/27/20  Provider, Historical   doxycycline (VIBRAMYCIN) 100 mg capsule doxycycline hyclate 100 mg capsule  5/27/20  Provider, Historical     Allergies   Allergen Reactions    Ciprofloxacin Other (comments)    Naproxen Unknown (comments)     Bleeding in stomach      Nsaids (Non-Steroidal Anti-Inflammatory Drug) Other (comments)       Patient Active Problem List   Diagnosis Code    Back pain M54.9    Cervical neck pain with evidence of disc disease M50.90    Fibromyalgia M79.7    Ulcerative colitis ([facility] [address] K51.90    Anxiety F41.9    Nicotine abuse Z72.0    Palpitations R00.2    Lower extremity edema R60.0    Venous insufficiency of both lower extremities I87.2    Varicose veins of both lower extremities with pain I83.813    Varicose veins of both legs with edema I83.893    Vitamin D deficiency E55.9    Tobacco use Z72.0       Review of Systems   Constitutional: Negative for fever and malaise/fatigue. Eyes: Negative for blurred vision. Gastrointestinal: Negative for heartburn, nausea and vomiting. Musculoskeletal: Positive for joint pain. All other systems reviewed and are negative. Objective:   Vital Signs: (As obtained by patient/caregiver at home)  Visit Vitals   5' 2\" (1.575 m)   Wt 144 lb (65.3 kg)   LMP 05/09/2005   BMI 26.34 kg/m²        [INSTRUCTIONS:  \"[x]\" Indicates a positive item  \"[]\" Indicates a negative item  -- DELETE ALL ITEMS NOT EXAMINED]    Constitutional: [x] Appears well-developed and well-nourished [x] No apparent distress      [] Abnormal -     Mental status: [x] Alert and awake  [x] Oriented to person/place/time [x] Able to follow commands    [] Abnormal -     Eyes:   EOM    [x]  Normal    [] Abnormal -   Sclera  [x]  Normal    [] Abnormal -          Discharge [x]  None visible   [] Abnormal -     HENT: [x] Normocephalic, atraumatic  [] Abnormal -   [x] Mouth/Throat: Mucous membranes are moist    External Ears [x] Normal  [] Abnormal -    Neck: [x] No visualized mass [] Abnormal -     Pulmonary/Chest: [x] Respiratory effort normal   [x] No visualized signs of difficulty breathing or respiratory distress        [] Abnormal -      Musculoskeletal:   [x] Normal gait with no signs of ataxia         [x] Normal range of motion of neck        [] Abnormal -     Neurological:        [x] No Facial Asymmetry (Cranial nerve 7 motor function) (limited exam due to video visit)          [x] No gaze palsy        [] Abnormal -          Skin:        [x] No significant exanthematous lesions or discoloration noted on facial skin         [] Abnormal -            Psychiatric:       [x] Normal Affect [] Abnormal -        [x] No Hallucinations    Other pertinent observable physical exam findings:-        We discussed the expected course, resolution and complications of the diagnosis(es) in detail.   Medication risks, benefits, costs, interactions, and alternatives were discussed as indicated. I advised her to contact the office if her condition worsens, changes or fails to improve as anticipated. She expressed understanding with the diagnosis(es) and plan. Gracy Vasquez is a 59 y.o. female who was evaluated by a video visit encounter for concerns as above. Patient identification was verified prior to start of the visit. A caregiver was present when appropriate. Due to this being a TeleHealth encounter (During [place]-[facility] public health emergency), evaluation of the following organ systems was limited: Vitals/Constitutional/EENT/Resp/CV/GI//MS/Neuro/Skin/Heme-Lymph-Imm. Pursuant to the emergency declaration under the [facility], [facility] waiver authority and the [employer] Act, this Virtual  Visit was conducted, with patient's (and/or legal guardian's) consent, to reduce the patient's risk of exposure to COVID-19 and provide necessary medical care. Services were provided through a video synchronous discussion virtually to substitute for in-person clinic visit. Patient and provider were located at their individual homes.       Isadora Gandara MD

## 2020-05-29 DIAGNOSIS — M79.7 FIBROMYALGIA: ICD-10-CM

## 2020-05-29 NOTE — TELEPHONE ENCOUNTER
----- Message from Cirilo Esparza sent at 5/29/2020 12:30 PM EDT -----  Regarding: Risser/telephone  Pt is requesting a Rx for Tizanidine and she has no medication. Pt number is [phone] and [facility] number is [phone].

## 2020-06-01 RX ORDER — TIZANIDINE 4 MG/1
TABLET ORAL
Qty: 90 TAB | Refills: 0 | Status: SHIPPED | OUTPATIENT
Start: 2020-06-01 | End: 2020-06-24

## 2020-06-24 DIAGNOSIS — M79.7 FIBROMYALGIA: ICD-10-CM

## 2020-06-24 RX ORDER — TIZANIDINE 4 MG/1
TABLET ORAL
Qty: 90 TAB | Refills: 0 | Status: SHIPPED | OUTPATIENT
Start: 2020-06-24 | End: 2020-07-17

## 2020-07-17 DIAGNOSIS — M79.7 FIBROMYALGIA: ICD-10-CM

## 2020-07-17 RX ORDER — TIZANIDINE 4 MG/1
TABLET ORAL
Qty: 90 TAB | Refills: 0 | Status: SHIPPED | OUTPATIENT
Start: 2020-07-17 | End: 2020-08-14

## 2020-08-13 DIAGNOSIS — M79.7 FIBROMYALGIA: ICD-10-CM

## 2020-08-14 RX ORDER — TIZANIDINE 4 MG/1
TABLET ORAL
Qty: 90 TAB | Refills: 0 | Status: SHIPPED | OUTPATIENT
Start: 2020-08-14 | End: 2020-09-08

## 2020-08-18 DIAGNOSIS — M79.7 FIBROMYALGIA: ICD-10-CM

## 2020-08-18 NOTE — TELEPHONE ENCOUNTER
Pt is calling requesting a refill on med    Requested Prescriptions     Pending Prescriptions Disp Refills    pregabalin (Lyrica) 75 mg capsule 180 Cap 1     Sig: TAKE ONE CAPSULE BY MOUTH TWICE A DAY

## 2020-08-19 RX ORDER — PREGABALIN 75 MG/1
CAPSULE ORAL
Qty: 180 CAP | Refills: 1 | Status: SHIPPED | OUTPATIENT
Start: 2020-08-19 | End: 2021-02-02

## 2020-09-07 DIAGNOSIS — M79.7 FIBROMYALGIA: ICD-10-CM

## 2020-09-08 RX ORDER — TIZANIDINE 4 MG/1
TABLET ORAL
Qty: 90 TAB | Refills: 0 | Status: SHIPPED | OUTPATIENT
Start: 2020-09-08 | End: 2020-10-05

## 2020-10-03 DIAGNOSIS — M79.7 FIBROMYALGIA: ICD-10-CM

## 2020-10-05 RX ORDER — TIZANIDINE 4 MG/1
TABLET ORAL
Qty: 90 TAB | Refills: 0 | Status: SHIPPED | OUTPATIENT
Start: 2020-10-05 | End: 2020-10-29

## 2020-10-23 ENCOUNTER — OFFICE VISIT (OUTPATIENT)
Dept: SURGERY | Age: 65
End: 2020-10-23
Payer: MEDICARE

## 2020-10-23 VITALS
WEIGHT: 140 LBS | OXYGEN SATURATION: 96 % | RESPIRATION RATE: 16 BRPM | HEART RATE: 92 BPM | HEIGHT: 62 IN | BODY MASS INDEX: 25.76 KG/M2 | DIASTOLIC BLOOD PRESSURE: 82 MMHG | SYSTOLIC BLOOD PRESSURE: 152 MMHG | TEMPERATURE: 98 F

## 2020-10-23 DIAGNOSIS — Z12.31 SCREENING MAMMOGRAM FOR HIGH-RISK PATIENT: ICD-10-CM

## 2020-10-23 DIAGNOSIS — Z80.3 FH: BREAST CANCER: ICD-10-CM

## 2020-10-23 DIAGNOSIS — D05.11 NEOPLASM OF RIGHT BREAST, PRIMARY TUMOR STAGING CATEGORY TIS: DUCTAL CARCINOMA IN SITU (DCIS): Primary | ICD-10-CM

## 2020-10-23 DIAGNOSIS — Z13.71 GENETIC DISEASE CARRIER STATUS TESTING, FEMALE: ICD-10-CM

## 2020-10-23 PROCEDURE — G8432 DEP SCR NOT DOC, RNG: HCPCS | Performed by: SURGERY

## 2020-10-23 PROCEDURE — 3017F COLORECTAL CA SCREEN DOC REV: CPT | Performed by: SURGERY

## 2020-10-23 PROCEDURE — 99205 OFFICE O/P NEW HI 60 MIN: CPT | Performed by: SURGERY

## 2020-10-23 PROCEDURE — G8536 NO DOC ELDER MAL SCRN: HCPCS | Performed by: SURGERY

## 2020-10-23 PROCEDURE — 1101F PT FALLS ASSESS-DOCD LE1/YR: CPT | Performed by: SURGERY

## 2020-10-23 PROCEDURE — G8427 DOCREV CUR MEDS BY ELIG CLIN: HCPCS | Performed by: SURGERY

## 2020-10-23 PROCEDURE — G9899 SCRN MAM PERF RSLTS DOC: HCPCS | Performed by: SURGERY

## 2020-10-23 PROCEDURE — G8400 PT W/DXA NO RESULTS DOC: HCPCS | Performed by: SURGERY

## 2020-10-23 PROCEDURE — G8419 CALC BMI OUT NRM PARAM NOF/U: HCPCS | Performed by: SURGERY

## 2020-10-23 PROCEDURE — 1090F PRES/ABSN URINE INCON ASSESS: CPT | Performed by: SURGERY

## 2020-10-23 RX ORDER — BACLOFEN 10 MG/1
10 TABLET ORAL 3 TIMES DAILY
COMMUNITY
Start: 2020-05-27 | End: 2020-10-28

## 2020-10-23 NOTE — PROGRESS NOTES
HISTORY OF PRESENT ILLNESS  Richar Calixto is a 72 y.o. 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 This mutation increases breast cancer risk four fold and increases the risk for pancreatic and prostate cancer as well. 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 She denies feeling masses, skin changes, nipple changes or drainage or unexplained weight loss/bone pain. She had menarche at 15, first of three pregnancies at 16, menopause in her late 45s and did not take HRT. Her last mammogram was 5/2018. 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     Past Medical History:   Diagnosis Date    Breast cancer [facility]) 2002    right breast    Family history of skin cancer     Sister - melanoma on shoulder - removed    Gastrointestinal disorder     proctitis    Other ill-defined conditions(799.89)     hereniated disc neck & lower spine    Other ill-defined conditions(799.89)     fibromyalgia    Other ill-defined conditions(799.89)     pleurisy    Sun-damaged skin     Sunburn, blistering     Tanning bed exposure      Past Surgical History:   Procedure Laterality Date    BREAST SURGERY PROCEDURE UNLISTED      breast cancer    HX BREAST LUMPECTOMY Right 2002    cancer    HX GYN  2013,[id number]    c sections    HX HYSTERECTOMY      HX ORTHOPAEDIC      left foot sx    HX OTHER SURGICAL       Family History   Problem Relation Age of Onset    Heart Disease Mother     Heart Surgery Mother     Liver Disease Father     Other Sister         neuroma    Cancer Sister     Other Other         muscular dystrophy    Coronary Artery Disease Maternal Grandfather     Heart Attack Maternal Grandfather     Breast Cancer Paternal Aunt  Cancer Maternal Aunt     Cancer Maternal Grandmother     Cancer Cousin      Social History     Tobacco Use    Smoking status: Current Every Day Smoker     Packs/day: 1.00     Types: Cigarettes     Last attempt to quit: 9/10/2010     Years since quitting: 10.1    Smokeless tobacco: Never Used   Substance Use Topics    Alcohol use: Yes     Alcohol/week: 2.5 standard drinks     Types: 3 Glasses of wine per week    Drug use: No     Current Outpatient Medications   Medication Sig    baclofen (LIORESAL) 10 mg tablet Take 10 mg by mouth three (3) times daily.  tiZANidine (ZANAFLEX) 4 mg tablet TAKE 1 TABLET BY MOUTH THREE TIMES DAILY AS NEEDED FOR FIBROMYALGIA SYMPTOMS    pregabalin (Lyrica) 75 mg capsule TAKE ONE CAPSULE BY MOUTH TWICE A DAY    traZODone (DESYREL) 50 mg tablet Take 1 Tab by mouth nightly.  metoprolol succinate (TOPROL-XL) 25 mg XL tablet Take 1 Tab by mouth daily.  melatonin 5 mg cap capsule Take 10 mg by mouth nightly.  ACETAMINOPHEN/DIPHENHYDRAMINE (TYLENOL PM PO) Take  by mouth nightly.  acetaminophen (TYLENOL EXTRA STRENGTH) 500 mg tablet Take  by mouth every six (6) hours as needed for Pain.  mesalamine DR (ASACOL HD) 800 mg DR tablet TAKE 1 TABLET BY MOUTH 3 TIMES DAILY    albuterol (PROVENTIL HFA, VENTOLIN HFA, PROAIR HFA) 90 mcg/actuation inhaler Ventolin HFA 90 mcg/actuation aerosol inhaler    albuterol-ipratropium (DUO-NEB) 2.5 mg-0.5 mg/3 ml nebu ipratropium-albuterol 0.5 mg-3 mg(2.5 mg base)/3 mL nebulization soln   Give 3 ml neb treatment now     No current facility-administered medications for this visit. Allergies   Allergen Reactions    Ciprofloxacin Other (comments)    Naproxen Unknown (comments)     Bleeding in stomach      Nsaids (Non-Steroidal Anti-Inflammatory Drug) Other (comments)       Review of Systems   Constitutional: Positive for malaise/fatigue. Negative for chills, diaphoresis, fever and weight loss. HENT: Negative for sore throat. Eyes: Negative for blurred vision and discharge. Respiratory: Negative for cough, shortness of breath and wheezing. Cardiovascular: Negative for chest pain, palpitations, orthopnea, claudication and leg swelling. Gastrointestinal: Positive for diarrhea. Negative for abdominal pain, constipation, heartburn, melena, nausea and vomiting. Genitourinary: Negative for dysuria, flank pain, frequency and hematuria. Musculoskeletal: Positive for back pain, joint pain and myalgias. Negative for neck pain. Skin: Negative for rash. Neurological: Negative for dizziness, speech change, focal weakness, seizures, loss of consciousness, weakness and headaches. Endo/Heme/Allergies: Does not bruise/bleed easily. Psychiatric/Behavioral: Negative for depression and memory loss. Visit Vitals  BP (!) 152/82 (BP 1 Location: Right arm, BP Patient Position: Sitting)   Pulse 92   Temp 98 °F (36.7 °C) (Temporal)   Resp 16   Ht 5' 2\" (1.575 m)   Wt 63.5 kg (140 lb)   LMP 05/09/2005   SpO2 96%   BMI 25.61 kg/m²       Physical Exam  Constitutional:       General: She is not in acute distress. Appearance: Normal appearance. She is well-developed and normal weight. She is not ill-appearing. Neck:      Thyroid: No thyromegaly. Vascular: No JVD. Trachea: No tracheal deviation. Neurological:      Mental Status: She is alert and oriented to person, place, and time. Psychiatric:         Behavior: Behavior normal.         Thought Content: Thought content normal.         Judgment: Judgment normal.       Limited exam today given that it was primarily a genetics discussion    ASSESSMENT and PLAN  1. Personal hx breast ca DCIS age 39. LEANNE  Overdue for a mammogram  Ordered a mammogram today    2.   Family hx ROBERTO mutation at [id number] TTATT and a VUS at NF1  [id number]  I had a 60 min discussion regarding genetic testing and advantages, risks and limitations of the testing  She elected to have genetic testing done and we did that at her visit today  3. Fibromyalgia. On rx  4. Insomnia. On multiple rx  5. Essential hypertension.   Stable on rx      RTC 4 weeks to discuss testing results and next steps    Criselda Bateman MD FACS

## 2020-10-28 DIAGNOSIS — M25.552 PAIN IN LEFT HIP: ICD-10-CM

## 2020-10-28 RX ORDER — METOPROLOL SUCCINATE 25 MG/1
TABLET, EXTENDED RELEASE ORAL
Qty: 90 TAB | Refills: 1 | Status: SHIPPED | OUTPATIENT
Start: 2020-10-28 | End: 2021-06-25

## 2020-10-28 RX ORDER — BACLOFEN 10 MG/1
TABLET ORAL
Qty: 270 TAB | Refills: 1 | Status: SHIPPED | OUTPATIENT
Start: 2020-10-28 | End: 2021-01-29

## 2020-10-29 DIAGNOSIS — M79.7 FIBROMYALGIA: ICD-10-CM

## 2020-10-29 RX ORDER — TIZANIDINE 4 MG/1
TABLET ORAL
Qty: 90 TAB | Refills: 0 | Status: SHIPPED | OUTPATIENT
Start: 2020-10-29 | End: 2020-11-30

## 2020-11-12 ENCOUNTER — TELEPHONE (OUTPATIENT)
Dept: SURGERY | Age: 65
End: 2020-11-12

## 2020-11-12 NOTE — TELEPHONE ENCOUNTER
Spoke with patient    ROBERTO mutation was not detected  NF1 VUS is not back as yet    Spoke with patient  Will look into why the NF1 is not back    Margo Garcia MD FACS

## 2020-11-16 NOTE — TELEPHONE ENCOUNTER
Called the [facility] genetic testing and they said that they do not test for NF1.

## 2020-11-17 ENCOUNTER — TELEPHONE (OUTPATIENT)
Dept: SURGERY | Age: 65
End: 2020-11-17

## 2020-11-28 DIAGNOSIS — M79.7 FIBROMYALGIA: ICD-10-CM

## 2020-11-30 RX ORDER — TIZANIDINE 4 MG/1
TABLET ORAL
Qty: 90 TAB | Refills: 0 | Status: SHIPPED | OUTPATIENT
Start: 2020-11-30 | End: 2020-12-23

## 2020-12-18 ENCOUNTER — HOSPITAL ENCOUNTER (OUTPATIENT)
Dept: MAMMOGRAPHY | Age: 65
Discharge: HOME | End: 2020-12-18
Attending: SURGERY
Payer: MEDICARE

## 2020-12-18 PROCEDURE — 77063 BREAST TOMOSYNTHESIS BI: CPT

## 2020-12-23 DIAGNOSIS — M79.7 FIBROMYALGIA: ICD-10-CM

## 2020-12-23 RX ORDER — TIZANIDINE 4 MG/1
TABLET ORAL
Qty: 90 TAB | Refills: 0 | Status: SHIPPED | OUTPATIENT
Start: 2020-12-23 | End: 2021-01-18

## 2021-01-18 DIAGNOSIS — M79.7 FIBROMYALGIA: ICD-10-CM

## 2021-01-18 RX ORDER — TIZANIDINE 4 MG/1
TABLET ORAL
Qty: 90 TAB | Refills: 0 | Status: SHIPPED | OUTPATIENT
Start: 2021-01-18 | End: 2021-01-29

## 2021-01-28 DIAGNOSIS — M79.7 FIBROMYALGIA: ICD-10-CM

## 2021-01-28 DIAGNOSIS — M25.552 PAIN IN LEFT HIP: ICD-10-CM

## 2021-01-29 RX ORDER — TIZANIDINE 4 MG/1
TABLET ORAL
Qty: 90 TAB | Refills: 0 | Status: SHIPPED | OUTPATIENT
Start: 2021-01-29 | End: 2021-03-10

## 2021-01-29 RX ORDER — BACLOFEN 10 MG/1
TABLET ORAL
Qty: 270 TAB | Refills: 1 | Status: SHIPPED | OUTPATIENT
Start: 2021-01-29 | End: 2021-04-26 | Stop reason: SDUPTHER

## 2021-03-04 ENCOUNTER — OFFICE VISIT (OUTPATIENT)
Dept: FAMILY MEDICINE CLINIC | Age: 66
End: 2021-03-04
Payer: MEDICARE

## 2021-03-04 VITALS
TEMPERATURE: 97.2 F | HEIGHT: 62 IN | SYSTOLIC BLOOD PRESSURE: 136 MMHG | HEART RATE: 95 BPM | BODY MASS INDEX: 26.13 KG/M2 | OXYGEN SATURATION: 97 % | RESPIRATION RATE: 17 BRPM | WEIGHT: 142 LBS | DIASTOLIC BLOOD PRESSURE: 84 MMHG

## 2021-03-04 DIAGNOSIS — K51.919 ULCERATIVE COLITIS WITH COMPLICATION, UNSPECIFIED LOCATION (HCC): ICD-10-CM

## 2021-03-04 DIAGNOSIS — M79.7 FIBROMYALGIA: ICD-10-CM

## 2021-03-04 DIAGNOSIS — M79.2 NERVE PAIN: ICD-10-CM

## 2021-03-04 DIAGNOSIS — R41.3 MEMORY CHANGES: Primary | ICD-10-CM

## 2021-03-04 DIAGNOSIS — G47.00 INSOMNIA, UNSPECIFIED TYPE: ICD-10-CM

## 2021-03-04 DIAGNOSIS — Z00.00 WELCOME TO MEDICARE PREVENTIVE VISIT: ICD-10-CM

## 2021-03-04 DIAGNOSIS — E55.9 VITAMIN D DEFICIENCY: ICD-10-CM

## 2021-03-04 DIAGNOSIS — R79.9 ABNORMAL FINDING OF BLOOD CHEMISTRY, UNSPECIFIED: ICD-10-CM

## 2021-03-04 PROCEDURE — G9899 SCRN MAM PERF RSLTS DOC: HCPCS | Performed by: FAMILY MEDICINE

## 2021-03-04 PROCEDURE — G0402 INITIAL PREVENTIVE EXAM: HCPCS | Performed by: FAMILY MEDICINE

## 2021-03-04 PROCEDURE — G0463 HOSPITAL OUTPT CLINIC VISIT: HCPCS | Performed by: FAMILY MEDICINE

## 2021-03-04 PROCEDURE — 99214 OFFICE O/P EST MOD 30 MIN: CPT | Performed by: FAMILY MEDICINE

## 2021-03-04 PROCEDURE — G8427 DOCREV CUR MEDS BY ELIG CLIN: HCPCS | Performed by: FAMILY MEDICINE

## 2021-03-04 PROCEDURE — 1090F PRES/ABSN URINE INCON ASSESS: CPT | Performed by: FAMILY MEDICINE

## 2021-03-04 PROCEDURE — 1101F PT FALLS ASSESS-DOCD LE1/YR: CPT | Performed by: FAMILY MEDICINE

## 2021-03-04 PROCEDURE — G8536 NO DOC ELDER MAL SCRN: HCPCS | Performed by: FAMILY MEDICINE

## 2021-03-04 PROCEDURE — G0403 EKG FOR INITIAL PREVENT EXAM: HCPCS | Performed by: FAMILY MEDICINE

## 2021-03-04 PROCEDURE — 3017F COLORECTAL CA SCREEN DOC REV: CPT | Performed by: FAMILY MEDICINE

## 2021-03-04 PROCEDURE — G8510 SCR DEP NEG, NO PLAN REQD: HCPCS | Performed by: FAMILY MEDICINE

## 2021-03-04 PROCEDURE — G8419 CALC BMI OUT NRM PARAM NOF/U: HCPCS | Performed by: FAMILY MEDICINE

## 2021-03-04 PROCEDURE — G8400 PT W/DXA NO RESULTS DOC: HCPCS | Performed by: FAMILY MEDICINE

## 2021-03-04 RX ORDER — TRAZODONE HYDROCHLORIDE 50 MG/1
25 TABLET ORAL
Qty: 45 TAB | Refills: 3 | Status: SHIPPED | OUTPATIENT
Start: 2021-03-04 | End: 2022-05-03

## 2021-03-04 NOTE — PROGRESS NOTES
1. Have you been to the ER, urgent care clinic since your last visit? Hospitalized since your last visit? No    2. Have you seen or consulted any other health care providers outside of the [address] [place] [address] since your last visit? Include any pap smears or colon screening. No    Health Maintenance Due   Topic Date Due    COVID-19 Vaccine (1 of 2) Never done    Shingrix Vaccine Age 50> (1 of 2) Never done    GLAUCOMA SCREENING Q2Y  Never done    Bone Densitometry (Dexa) Screening  Never done    Medicare Yearly Exam  Never done    Colorectal Cancer Screening Combo  01/11/2021     Chief Complaint   Patient presents with    Medication Evaluation     Pt would also like to discuss her memory as well as \"stinging\" sensations with her fibromyalgia.

## 2021-03-04 NOTE — PROGRESS NOTES
Chief Complaint   Patient presents with    Medication Evaluation     Pt reports that she has been having trouble with names and recall. Pt reports that she has been forgetting people she knows. Pt has also been having problems with \"stinging\" in her fingers and toes. Pt saw her orthopedic provider, was diagnosed with bursitis and tendonitis. Pt has been diagnosed with arthritis in the lower spine. Pt has ulcerative colitis, fibromyalgia, pt has also been given metoprolol for palpitations. Subjective: (As above and below)     Chief Complaint   Patient presents with    Medication Evaluation     she is a 72y.o. year old female who presents for evaluation. Reviewed PmHx, RxHx, FmHx, SocHx, AllgHx and updated in chart. Review of Systems - negative except as listed above    Objective:     Vitals:    03/04/21 1421   BP: 136/84   Pulse: 95   Resp: 17   Temp: 97.2 °F (36.2 °C)   TempSrc: Temporal   SpO2: 97%   Weight: 142 lb (64.4 kg)   Height: 5' 2\" (1.575 m)     Physical Examination: General appearance - alert, well appearing, and in no distress  Mental status - normal mood, behavior, speech, dress, motor activity, and thought processes  Ears - bilateral TM's and external ear canals normal  Chest - clear to auscultation, no wheezes, rales or rhonchi, symmetric air entry  Heart - normal rate, regular rhythm, normal S1, S2, no murmurs, rubs, clicks or gallops  Musculoskeletal - no joint tenderness, deformity or swelling  Extremities - peripheral pulses normal, no pedal edema, no clubbing or cyanosis    Assessment/ Plan:   1. Ulcerative colitis with complication, unspecified location [facility])  -followed by GI    2. Memory changes  -refer for evalaution  - REFERRAL TO NEUROLOGY    3. Nerve pain  - REFERRAL TO NEUROLOGY    4. Fibromyalgia  -refill, sleep is stable  - traZODone (DESYREL) 50 mg tablet; Take 0.5 Tabs by mouth nightly. Dispense: 45 Tab; Refill: 3    5.  Insomnia, unspecified type  - traZODone (DESYREL) 50 mg tablet; Take 0.5 Tabs by mouth nightly. Dispense: 45 Tab; Refill: 3    6. Welcome to Medicare preventive visit  - AMB POC EKG ROUTINE W/ 12 LEADS, SCREEN ()     Follow-up and Dispositions    · Return in about 1 year (around 3/4/2022), or if symptoms worsen or fail to improve. I have discussed the diagnosis with the patient and the intended plan as seen in the above orders. The patient has received an after-visit summary and questions were answered concerning future plans. Medication Side Effects and Warnings were discussed with patient: yes  Patient Labs were reviewed: yes  Patient Past Records were reviewed:  yes    Alee Sosa M.D. This is a \"Welcome to [employer]"  Initial Preventive Physical Examination (IPPE) providing Personalized Prevention Plan Services (Performed in the first 12 months of enrollment)    I have reviewed the patient's medical history in detail and updated the computerized patient record. Depression Risk Screen     3 most recent PHQ Screens 3/4/2021   PHQ Not Done -   Little interest or pleasure in doing things Not at all   Feeling down, depressed, irritable, or hopeless Not at all   Total Score PHQ 2 0       Alcohol Risk Screen    Do you average more than 1 drink per night or more than 7 drinks a week:  Yes, pt has 1-2 glasses of wine per evening    On any one occasion in the past three months have you have had more than 3 drinks containing alcohol:  Yes, but rarely     Functional Ability and Level of Safety    Diet: No special diet      Hearing: Hearing is good. Vision Screening:  Vision is good. needs new glasses  No exam data present      Activities of Daily Living: The home contains: handrails, grab bars and shower chair  Patient does total self care      Ambulation: with mild difficulty      Exercise level: sedentary     Fall Risk Screen:  Fall Risk Assessment, last 12 mths 3/4/2021   Able to walk?  Yes   Fall in past 12 months? 0   Do you feel unsteady? 0   Are you worried about falling 0      Abuse Screen:  Patient is not abused       Screening EKG   EKG order placed: Yes    End of Life Planning   Advanced care planning directives were discussed with the patient and /or family/caregiver. Pt has been discussing with , but not complete  Assessment/Plan   Education and counseling provided:  Are appropriate based on today's review and evaluation    Diagnoses and all orders for this visit:    1. Memory changes  -     REFERRAL TO NEUROLOGY    2. Ulcerative colitis with complication, unspecified location ([facility] [address]    3. Nerve pain  -     REFERRAL TO NEUROLOGY    4. Fibromyalgia  -     traZODone (DESYREL) 50 mg tablet; Take 0.5 Tabs by mouth nightly. 5. Insomnia, unspecified type  -     traZODone (DESYREL) 50 mg tablet; Take 0.5 Tabs by mouth nightly.     6. Welcome to Medicare preventive visit  -     AMB POC EKG ROUTINE W/ 12 LEADS, SCREEN ()        Health Maintenance Due     Health Maintenance Due   Topic Date Due    COVID-19 Vaccine (1 of 2) Never done    GLAUCOMA SCREENING Q2Y  Never done    Bone Densitometry (Dexa) Screening  Never done    Colorectal Cancer Screening Combo  01/11/2021       Patient Care Team   Patient Care Team:  Vilma Mcdaniels MD as PCP - General (Family Medicine)  Vilma Mcdaniels MD as PCP - [facility] Empaneled Provider  Sybil Hansen MD (Cardiology)  Iglesia Moraes MD (Cardiology)  Vilma Mcdaniels MD as Referring Provider (Family Medicine)  Ange Parker MD as Surgeon (General Surgery)    History     Past Medical History:   Diagnosis Date    Breast cancer [facility]) 2002    right breast    Family history of skin cancer     Sister - melanoma on shoulder - removed    Gastrointestinal disorder     proctitis    Other ill-defined conditions(799.89)     hereniated disc neck & lower spine    Other ill-defined conditions(799.89)     fibromyalgia    Other ill-defined conditions(799.89) pleurisy    Sun-damaged skin     Sunburn, blistering     Tanning bed exposure       Past Surgical History:   Procedure Laterality Date    HX BREAST LUMPECTOMY Right 2002    cancer    HX GYN  [id number]    c sections    HX HYSTERECTOMY      HX ORTHOPAEDIC      left foot sx    HX OTHER SURGICAL      AR BREAST SURGERY PROCEDURE UNLISTED      breast cancer     Current Outpatient Medications   Medication Sig Dispense Refill    traZODone (DESYREL) 50 mg tablet Take 0.5 Tabs by mouth nightly. 45 Tab 3    pregabalin (LYRICA) 75 mg capsule TAKE 1 CAPSULE BY MOUTH TWICE A  Cap 0    tiZANidine (ZANAFLEX) 4 mg tablet TAKE 1 TABLET BY MOUTH THREE TIMES DAILY AS NEEDED FOR FIBROMYALGIA SYMPTOMS 90 Tab 0    baclofen (LIORESAL) 10 mg tablet TAKE 1 TABLET BY MOUTH 3 TIMES A  Tab 1    metoprolol succinate (TOPROL-XL) 25 mg XL tablet TAKE 1 TABLET BY MOUTH EVERY DAY AT NIGHT 90 Tab 1    albuterol (PROVENTIL HFA, VENTOLIN HFA, PROAIR HFA) 90 mcg/actuation inhaler Ventolin HFA 90 mcg/actuation aerosol inhaler      albuterol-ipratropium (DUO-NEB) 2.5 mg-0.5 mg/3 ml nebu ipratropium-albuterol 0.5 mg-3 mg(2.5 mg base)/3 mL nebulization soln   Give 3 ml neb treatment now      melatonin 5 mg cap capsule Take 10 mg by mouth nightly.  ACETAMINOPHEN/DIPHENHYDRAMINE (TYLENOL PM PO) Take  by mouth nightly.  acetaminophen (TYLENOL EXTRA STRENGTH) 500 mg tablet Take  by mouth every six (6) hours as needed for Pain.       mesalamine DR (ASACOL HD) 800 mg DR tablet TAKE 1 TABLET BY MOUTH 3 TIMES DAILY       Allergies   Allergen Reactions    Ciprofloxacin Other (comments)    Naproxen Unknown (comments)     Bleeding in stomach      Nsaids (Non-Steroidal Anti-Inflammatory Drug) Other (comments)       Family History   Problem Relation Age of Onset    Heart Disease Mother     Heart Surgery Mother     Liver Disease Father     Other Sister         neuroma    Cancer Sister     Other Other         muscular dystrophy    Coronary Artery Disease Maternal Grandfather     Heart Attack Maternal Grandfather     Breast Cancer Paternal Aunt     Cancer Maternal Aunt     Cancer Maternal Grandmother     Cancer Cousin      Social History     Tobacco Use    Smoking status: Current Every Day Smoker     Packs/day: 1.00     Types: Cigarettes     Last attempt to quit: 9/10/2010     Years since quitting: 10.4    Smokeless tobacco: Never Used   Substance Use Topics    Alcohol use:  Yes     Alcohol/week: 2.5 standard drinks     Types: 3 Glasses of wine per week

## 2021-03-04 NOTE — PATIENT INSTRUCTIONS
Medicare Wellness Visit, Female The best way to live healthy is to have a lifestyle where you eat a well-balanced diet, exercise regularly, limit alcohol use, and quit all forms of tobacco/nicotine, if applicable. Regular preventive services are another way to keep healthy. Preventive services (vaccines, screening tests, monitoring & exams) can help personalize your care plan, which helps you manage your own care. Screening tests can find health problems at the earliest stages, when they are easiest to treat. [facility] follows the current, evidence-based guidelines published by the [facility] ([facility]) when recommending preventive services for our patients. Because we follow these guidelines, sometimes recommendations change over time as research supports it. (For example, mammograms used to be recommended annually. Even though Medicare will still pay for an annual mammogram, the newer guidelines recommend a mammogram every two years for women of average risk). Of course, you and your doctor may decide to screen more often for some diseases, based on your risk and your co-morbidities (chronic disease you are already diagnosed with). Preventive services for you include: - Medicare offers their members a free annual wellness visit, which is time for you and your primary care provider to discuss and plan for your preventive service needs. Take advantage of this benefit every year! 
-All adults over the age of 72 should receive the recommended pneumonia vaccines. Current USPSTF guidelines recommend a series of two vaccines for the best pneumonia protection.  
-All adults should have a flu vaccine yearly and a tetanus vaccine every 10 years.  
-All adults age 48 and older should receive the shingles vaccines (series of two vaccines). -All adults age 38-68 who are overweight should have a diabetes screening test once every three years. -All adults born between [birth year] and 1965 should be screened once for Hepatitis C. 
-Other screening tests and preventive services for persons with diabetes include: an eye exam to screen for diabetic retinopathy, a kidney function test, a foot exam, and stricter control over your cholesterol.  
-Cardiovascular screening for adults with routine risk involves an electrocardiogram (ECG) at intervals determined by your doctor.  
-Colorectal cancer screenings should be done for adults age 54-65 with no increased risk factors for colorectal cancer. There are a number of acceptable methods of screening for this type of cancer. Each test has its own benefits and drawbacks. Discuss with your doctor what is most appropriate for you during your annual wellness visit. The different tests include: colonoscopy (considered the best screening method), a fecal occult blood test, a fecal DNA test, and sigmoidoscopy. 
 
-A bone mass density test is recommended when a woman turns 65 to screen for osteoporosis. This test is only recommended one time, as a screening. Some providers will use this same test as a disease monitoring tool if you already have osteoporosis. -Breast cancer screenings are recommended every other year for women of normal risk, age 54-69. 
-Cervical cancer screenings for women over age 72 are only recommended with certain risk factors. Here is a list of your current Health Maintenance items (your personalized list of preventive services) with a due date: 
Health Maintenance Due Topic Date Due  
 COVID-19 Vaccine (1 of 2) Never done  Glaucoma Screening   Never done  Bone Mineral Density   Never done  Colorectal Screening  01/11/2021

## 2021-03-05 LAB
25(OH)D3+25(OH)D2 SERPL-MCNC: 26 NG/ML (ref 30–100)
ALBUMIN SERPL-MCNC: 4.5 G/DL (ref 3.8–4.8)
ALBUMIN/GLOB SERPL: 1.7 {RATIO} (ref 1.2–2.2)
ALP SERPL-CCNC: 103 IU/L (ref 39–117)
ALT SERPL-CCNC: 27 IU/L (ref 0–32)
AST SERPL-CCNC: 50 IU/L (ref 0–40)
BILIRUB SERPL-MCNC: 0.5 MG/DL (ref 0–1.2)
BUN SERPL-MCNC: 20 MG/DL (ref 8–27)
BUN/CREAT SERPL: 35 (ref 12–28)
CALCIUM SERPL-MCNC: 9.5 MG/DL (ref 8.7–10.3)
CHLORIDE SERPL-SCNC: 101 MMOL/L (ref 96–106)
CHOLEST SERPL-MCNC: 221 MG/DL (ref 100–199)
CO2 SERPL-SCNC: 21 MMOL/L (ref 20–29)
CREAT SERPL-MCNC: 0.57 MG/DL (ref 0.57–1)
ERYTHROCYTE [DISTWIDTH] IN BLOOD BY AUTOMATED COUNT: 12.4 % (ref 11.7–15.4)
EST. AVERAGE GLUCOSE BLD GHB EST-MCNC: 100 MG/DL
GLOBULIN SER CALC-MCNC: 2.7 G/DL (ref 1.5–4.5)
GLUCOSE SERPL-MCNC: 90 MG/DL (ref 65–99)
HBA1C MFR BLD: 5.1 % (ref 4.8–5.6)
HCT VFR BLD AUTO: 37.9 % (ref 34–46.6)
HDLC SERPL-MCNC: 94 MG/DL
HGB BLD-MCNC: 13.6 G/DL (ref 11.1–15.9)
IMP & REVIEW OF LAB RESULTS: NORMAL
LDLC SERPL CALC-MCNC: 108 MG/DL (ref 0–99)
MCH RBC QN AUTO: 36.7 PG (ref 26.6–33)
MCHC RBC AUTO-ENTMCNC: 35.9 G/DL (ref 31.5–35.7)
MCV RBC AUTO: 102 FL (ref 79–97)
PLATELET # BLD AUTO: 175 X10E3/UL (ref 150–450)
POTASSIUM SERPL-SCNC: 4 MMOL/L (ref 3.5–5.2)
PROT SERPL-MCNC: 7.2 G/DL (ref 6–8.5)
RBC # BLD AUTO: 3.71 X10E6/UL (ref 3.77–5.28)
SODIUM SERPL-SCNC: 137 MMOL/L (ref 134–144)
TRIGL SERPL-MCNC: 112 MG/DL (ref 0–149)
TSH SERPL DL<=0.005 MIU/L-ACNC: 1.16 UIU/ML (ref 0.45–4.5)
VLDLC SERPL CALC-MCNC: 19 MG/DL (ref 5–40)
WBC # BLD AUTO: 7.6 X10E3/UL (ref 3.4–10.8)

## 2021-03-06 NOTE — PROGRESS NOTES
Slight increase in single liver enzyme. We will continue to monitor with routine labs. Vitamin D is slightly low, please take a daily supplement of at least 2000 IU (international units) daily. All other labs are within normal limits. Recheck in 6 months.    Please inform

## 2021-03-10 DIAGNOSIS — M79.7 FIBROMYALGIA: ICD-10-CM

## 2021-03-10 RX ORDER — TIZANIDINE 4 MG/1
TABLET ORAL
Qty: 90 TAB | Refills: 0 | Status: SHIPPED | OUTPATIENT
Start: 2021-03-10 | End: 2021-04-06

## 2021-04-06 DIAGNOSIS — M79.7 FIBROMYALGIA: ICD-10-CM

## 2021-04-06 RX ORDER — TIZANIDINE 4 MG/1
TABLET ORAL
Qty: 90 TAB | Refills: 0 | Status: SHIPPED | OUTPATIENT
Start: 2021-04-06 | End: 2021-05-03

## 2021-04-26 DIAGNOSIS — M25.552 PAIN IN LEFT HIP: ICD-10-CM

## 2021-04-26 RX ORDER — BACLOFEN 10 MG/1
TABLET ORAL
Qty: 270 TAB | Refills: 1 | Status: SHIPPED | OUTPATIENT
Start: 2021-04-26 | End: 2022-01-18

## 2021-04-26 NOTE — TELEPHONE ENCOUNTER
Received phone call from pt for refill of:    Requested Prescriptions     Pending Prescriptions Disp Refills    baclofen (LIORESAL) 10 mg tablet 270 Tab 1

## 2021-04-28 LAB — PAP SMEAR, EXTERNAL: NORMAL

## 2021-05-01 DIAGNOSIS — M79.7 FIBROMYALGIA: ICD-10-CM

## 2021-05-03 RX ORDER — TIZANIDINE 4 MG/1
TABLET ORAL
Qty: 90 TAB | Refills: 0 | Status: SHIPPED | OUTPATIENT
Start: 2021-05-03 | End: 2021-05-30

## 2021-05-18 DIAGNOSIS — M79.7 FIBROMYALGIA: ICD-10-CM

## 2021-05-19 RX ORDER — PREGABALIN 75 MG/1
CAPSULE ORAL
Qty: 180 CAPSULE | Refills: 0 | Status: SHIPPED | OUTPATIENT
Start: 2021-05-19 | End: 2021-08-31

## 2021-05-29 DIAGNOSIS — M79.7 FIBROMYALGIA: ICD-10-CM

## 2021-05-30 RX ORDER — TIZANIDINE 4 MG/1
TABLET ORAL
Qty: 90 TABLET | Refills: 0 | Status: SHIPPED | OUTPATIENT
Start: 2021-05-30 | End: 2021-06-23

## 2021-06-23 DIAGNOSIS — M79.7 FIBROMYALGIA: ICD-10-CM

## 2021-06-23 RX ORDER — TIZANIDINE 4 MG/1
TABLET ORAL
Qty: 90 TABLET | Refills: 0 | Status: SHIPPED | OUTPATIENT
Start: 2021-06-23 | End: 2021-07-19

## 2021-07-18 DIAGNOSIS — M79.7 FIBROMYALGIA: ICD-10-CM

## 2021-07-19 RX ORDER — TIZANIDINE 4 MG/1
TABLET ORAL
Qty: 90 TABLET | Refills: 0 | Status: SHIPPED | OUTPATIENT
Start: 2021-07-19 | End: 2021-08-13

## 2021-08-05 NOTE — PROGRESS NOTES
[address]  [phone]     Subjective:      Nico Yung is a 77 y.o. female is here for routine f/u. Pmhx HTN, fibromyalgia, venous insufficiency, ulcerative colitis, HLD and tobacco abuse. Last seen by us in 5/19. At that time she c/o of some atypical cp, stress echo was ordered and came back negative for ischemia. Today,  Continues to smoke 1 PPD. Having intermittent palpitation with some chest discomfort. Sob unchanged. The patient denies orthopnea, PND, LE edema, syncope, or presyncope. Stress echo 5/19   · Baseline ECG: Normal EKG. · An upward-sloping ST segment depression was noted during stress. · Negative stress echocardiogram for evidence of ischemia. Low risk study.   · Negative stress test.      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) 10/23/2020    Tobacco use 05/13/2019    Vitamin D deficiency 01/02/2018    Varicose veins of both lower extremities with pain 03/21/2017    Varicose veins of both legs with edema 03/21/2017    Venous insufficiency of both lower extremities 12/06/2016    Lower extremity edema 11/11/2016    Palpitations 11/01/2016    Nicotine abuse 10/28/2016    Ulcerative colitis ([facility] [address] 03/05/2013    Anxiety 03/05/2013    Fibromyalgia 01/31/2012    Back pain 06/12/2011    Cervical neck pain with evidence of disc disease 06/12/2011      Sp Ramon MD  Past Medical History:   Diagnosis Date    Breast cancer [facility]) 2002    right breast    Family history of skin cancer     Sister - melanoma on shoulder - removed    Gastrointestinal disorder     proctitis    Other ill-defined conditions(799.89)     hereniated disc neck & lower spine    Other ill-defined conditions(799.89)     fibromyalgia    Other ill-defined conditions(799.89) pleurisy    Sun-damaged skin     Sunburn, blistering     Tanning bed exposure       Past Surgical History:   Procedure Laterality Date    HX BREAST LUMPECTOMY Right 2002    cancer    HX GYN  [id number],[id number]    c sections    HX HYSTERECTOMY      HX ORTHOPAEDIC      left foot sx    HX OTHER SURGICAL      OK BREAST SURGERY PROCEDURE UNLISTED      breast cancer     Allergies   Allergen Reactions    Ciprofloxacin Other (comments)    Mesalamine Other (comments)    Naproxen Unknown (comments)     Bleeding in stomach      Nsaids (Non-Steroidal Anti-Inflammatory Drug) Other (comments)      Family History   Problem Relation Age of Onset    Heart Disease Mother     Heart Surgery Mother     Liver Disease Father     Other Sister         neuroma    Cancer Sister     Other Other         muscular dystrophy    Coronary Artery Disease Maternal Grandfather     Heart Attack Maternal Grandfather     Breast Cancer Paternal Aunt     Cancer Maternal Aunt     Cancer Maternal Grandmother     Cancer Cousin       Social History     Socioeconomic History    Marital status:      Spouse name: Not on file    Number of children: Not on file    Years of education: Not on file    Highest education level: Not on file   Occupational History    Not on file   Tobacco Use    Smoking status: Current Every Day Smoker     Packs/day: 1.00     Types: Cigarettes    Smokeless tobacco: Never Used   Vaping Use    Vaping Use: Never used   Substance and Sexual Activity    Alcohol use:  Yes     Alcohol/week: 2.5 standard drinks     Types: 3 Glasses of wine per week    Drug use: No    Sexual activity: Not on file   Other Topics Concern    Not on file   Social History Narrative    Not on file     Social Determinants of Health     Financial Resource Strain:     Difficulty of Paying Living Expenses:    Food Insecurity:     Worried About Running Out of Food in the Last Year:     [address] in the Last Year: Transportation Needs:     Lack of Transportation (Medical):  Lack of Transportation (Non-Medical):    Physical Activity:     Days of Exercise per Week:     Minutes of Exercise per Session:    Stress:     Feeling of Stress :    Social Connections:     Frequency of Communication with Friends and Family:     Frequency of Social Gatherings with Friends and Family:     Attends Scientologist Services:     Active Member of Clubs or Organizations:     Attends Club or Organization Meetings:     Marital Status:    Intimate Partner Violence:     Fear of Current or Ex-Partner:     Emotionally Abused:     Physically Abused:     Sexually Abused:       Current Outpatient Medications   Medication Sig    cholecalciferol (VITAMIN D3) (2,000 UNITS /50 MCG) cap capsule Vitamin D3 50 mcg (2,000 unit) capsule   Take by oral route.  mecobalamin, vitamin B12, 1,000 mcg chew B12 Active 1,000 mcg chewable tablet   Take by oral route.  OTHER Take  by mouth daily. Prevagen    tiZANidine (ZANAFLEX) 4 mg tablet TAKE 1 TABLET BY MOUTH THREE TIMES DAILY AS NEEDED FOR FIBROMYALGIA SYMPTOMS    metoprolol succinate (TOPROL-XL) 25 mg XL tablet TAKE 1 TABLET BY MOUTH EVERY DAY AT NIGHT    pregabalin (LYRICA) 75 mg capsule TAKE 1 CAPSULE BY MOUTH TWICE A DAY    baclofen (LIORESAL) 10 mg tablet TAKE 1 TABLET BY MOUTH 3 TIMES A DAY    traZODone (DESYREL) 50 mg tablet Take 0.5 Tabs by mouth nightly.  melatonin 5 mg cap capsule Take 10 mg by mouth nightly.  ACETAMINOPHEN/DIPHENHYDRAMINE (TYLENOL PM PO) Take  by mouth nightly.  acetaminophen (TYLENOL EXTRA STRENGTH) 500 mg tablet Take  by mouth every six (6) hours as needed for Pain.  mesalamine DR (ASACOL HD) 800 mg DR tablet TAKE 1 TABLET BY MOUTH 3 TIMES DAILY (Patient not taking: Reported on 8/23/2021)     No current facility-administered medications for this visit.          Review of Symptoms:  11 systems reviewed, negative other than as stated in the HPI    Physical ExamPhysical Exam:    Vitals:    08/23/21 1039 08/23/21 1059   BP: (!) 150/98 134/82   Pulse: 78    Resp: 18    SpO2: 97%    Weight: 140 lb 11.2 oz (63.8 kg)    Height: 5' 2\" (1.575 m)      Body mass index is 25.73 kg/m². General PE  Gen:  NAD  Mental Status - Alert. General Appearance - Not in acute distress. HEENT:  PERRL, no carotid bruits or JVD  Chest and Lung Exam   Inspection: Accessory muscles - No use of accessory muscles in breathing. Auscultation:   Breath sounds: - Normal.   Cardiovascular   Inspection: Jugular vein - Bilateral - Inspection Normal.   Palpation/Percussion:   Apical Impulse: - Normal.   Auscultation: Rhythm - Regular. Heart Sounds - S1 WNL and S2 WNL. No S3 or S4. Murmurs & Other Heart Sounds: Auscultation of the heart reveals - No Murmurs. Peripheral Vascular   Upper Extremity: Inspection - Bilateral - No Cyanotic nailbeds or Digital clubbing. Lower Extremity:   Palpation: Edema - Bilateral - No edema. Abdomen:   Soft, non-tender, bowel sounds are active.   Neuro: A&O times 3, CN and motor grossly WNL    Labs:   Lab Results   Component Value Date/Time    Cholesterol, total 221 (H) 03/04/2021 03:20 PM    Cholesterol, total 207 (H) 05/13/2019 02:13 PM    Cholesterol, total 192 08/31/2018 12:15 PM    Cholesterol, total 187 12/29/2017 10:23 AM    Cholesterol, total 226 (H) 02/16/2016 02:36 PM    HDL Cholesterol 94 03/04/2021 03:20 PM    HDL Cholesterol 81 05/13/2019 02:13 PM    HDL Cholesterol 77 08/31/2018 12:15 PM    HDL Cholesterol 50 12/29/2017 10:23 AM    HDL Cholesterol 91 02/16/2016 02:36 PM    LDL, calculated 108 (H) 03/04/2021 03:20 PM    LDL, calculated 107 (H) 05/13/2019 02:13 PM    LDL, calculated 96 08/31/2018 12:15 PM    LDL, calculated 113 (H) 12/29/2017 10:23 AM    LDL, calculated 111 (H) 02/16/2016 02:36 PM    LDL, calculated 116 (H) 01/31/2012 10:53 AM    Triglyceride 112 03/04/2021 03:20 PM    Triglyceride 93 05/13/2019 02:13 PM    Triglyceride 93 08/31/2018 12:15 PM    Triglyceride 121 12/29/2017 10:23 AM    Triglyceride 118 02/16/2016 02:36 PM     Lab Results   Component Value Date/Time    CK 94 04/25/2012 04:45 PM     Lab Results   Component Value Date/Time    Sodium 137 03/04/2021 03:20 PM    Potassium 4.0 03/04/2021 03:20 PM    Chloride 101 03/04/2021 03:20 PM    CO2 21 03/04/2021 03:20 PM    Anion gap 6 04/25/2012 04:45 PM    Glucose 90 03/04/2021 03:20 PM    BUN 20 03/04/2021 03:20 PM    Creatinine 0.57 03/04/2021 03:20 PM    BUN/Creatinine ratio 35 (H) 03/04/2021 03:20 PM    GFR est  03/04/2021 03:20 PM    GFR est non-AA 98 03/04/2021 03:20 PM    Calcium 9.5 03/04/2021 03:20 PM    Bilirubin, total 0.5 03/04/2021 03:20 PM    Alk. phosphatase 103 03/04/2021 03:20 PM    Protein, total 7.2 03/04/2021 03:20 PM    Albumin 4.5 03/04/2021 03:20 PM    Globulin 3.4 04/25/2012 04:45 PM    A-G Ratio 1.7 03/04/2021 03:20 PM    ALT (SGPT) 27 03/04/2021 03:20 PM       EKG:         Assessment:     Assessment:        ICD-10-CM ICD-9-CM    1. Atypical chest pain  R07.89 786.59 AMB POC EKG ROUTINE W/ 12 LEADS, INTER & REP      ECHO STRESS      CARDIAC EVENT MONITOR   2. Varicose veins of both lower extremities with pain  I83.813 454.8 AMB POC EKG ROUTINE W/ 12 LEADS, INTER & REP      ECHO STRESS      CARDIAC EVENT MONITOR   3. Venous insufficiency of both lower extremities  I87.2 459.81 ECHO STRESS      CARDIAC EVENT MONITOR   4. Essential hypertension  I10 401.9 ECHO STRESS      CARDIAC EVENT MONITOR   5. Current smoker  F17.200 305.1 ECHO STRESS      CARDIAC EVENT MONITOR   6. Intermittent palpitations  R00.2 785.1 ECHO STRESS      CARDIAC EVENT MONITOR   7.  Mixed hyperlipidemia  E78.2 272.2 ECHO STRESS      CARDIAC EVENT MONITOR       Orders Placed This Encounter    AMB POC EKG ROUTINE W/ 12 LEADS, INTER & REP     Order Specific Question:   Reason for Exam:     Answer:   routine    cholecalciferol (VITAMIN D3) (2,000 UNITS /50 MCG) cap capsule     Sig: Vitamin D3 50 mcg (2,000 unit) capsule   Take by oral route.  mecobalamin, vitamin B12, 1,000 mcg chew     Sig: B12 Active 1,000 mcg chewable tablet   Take by oral route.  OTHER     Sig: Take  by mouth daily. Prevagen        Plan:     Hx atypical cp  Normal stress echo in 5/2019  Repeat stress echo  Coronary calcium scoring would be reassuring if totally normal and threshold for further testing/treatment would be decreased if high. Palpitation, intermittent  TSH ok 3/2021  Obtain 1 mos event      HTN  Controlled with BB    Venous insufficiency  Bilateral GSV and left SSV reflux in 11/2016  Stable.     LE arterial duplex in 2016 : No significant occlusive disease    HLD  3/2021  Labs and lipids per PCP    Tobacco abuse  Tobacco cessation discussed with patients       Continue current care and f/u in 3 mos      Lawanda Corona NP

## 2021-08-13 DIAGNOSIS — M79.7 FIBROMYALGIA: ICD-10-CM

## 2021-08-13 RX ORDER — TIZANIDINE 4 MG/1
TABLET ORAL
Qty: 90 TABLET | Refills: 0 | Status: SHIPPED | OUTPATIENT
Start: 2021-08-13 | End: 2021-09-07

## 2021-08-23 ENCOUNTER — OFFICE VISIT (OUTPATIENT)
Dept: CARDIOLOGY CLINIC | Age: 66
End: 2021-08-23
Payer: MEDICARE

## 2021-08-23 VITALS
DIASTOLIC BLOOD PRESSURE: 82 MMHG | RESPIRATION RATE: 18 BRPM | OXYGEN SATURATION: 97 % | HEART RATE: 78 BPM | BODY MASS INDEX: 25.89 KG/M2 | WEIGHT: 140.7 LBS | SYSTOLIC BLOOD PRESSURE: 134 MMHG | HEIGHT: 62 IN

## 2021-08-23 DIAGNOSIS — R00.2 INTERMITTENT PALPITATIONS: ICD-10-CM

## 2021-08-23 DIAGNOSIS — I87.2 VENOUS INSUFFICIENCY OF BOTH LOWER EXTREMITIES: ICD-10-CM

## 2021-08-23 DIAGNOSIS — E78.2 MIXED HYPERLIPIDEMIA: ICD-10-CM

## 2021-08-23 DIAGNOSIS — R07.89 ATYPICAL CHEST PAIN: Primary | ICD-10-CM

## 2021-08-23 DIAGNOSIS — I10 ESSENTIAL HYPERTENSION: ICD-10-CM

## 2021-08-23 DIAGNOSIS — F17.200 CURRENT SMOKER: ICD-10-CM

## 2021-08-23 DIAGNOSIS — I83.813 VARICOSE VEINS OF BOTH LOWER EXTREMITIES WITH PAIN: ICD-10-CM

## 2021-08-23 PROCEDURE — G8427 DOCREV CUR MEDS BY ELIG CLIN: HCPCS | Performed by: NURSE PRACTITIONER

## 2021-08-23 PROCEDURE — G8432 DEP SCR NOT DOC, RNG: HCPCS | Performed by: NURSE PRACTITIONER

## 2021-08-23 PROCEDURE — 1101F PT FALLS ASSESS-DOCD LE1/YR: CPT | Performed by: NURSE PRACTITIONER

## 2021-08-23 PROCEDURE — 1090F PRES/ABSN URINE INCON ASSESS: CPT | Performed by: NURSE PRACTITIONER

## 2021-08-23 PROCEDURE — G8400 PT W/DXA NO RESULTS DOC: HCPCS | Performed by: NURSE PRACTITIONER

## 2021-08-23 PROCEDURE — 3017F COLORECTAL CA SCREEN DOC REV: CPT | Performed by: NURSE PRACTITIONER

## 2021-08-23 PROCEDURE — 93005 ELECTROCARDIOGRAM TRACING: CPT | Performed by: NURSE PRACTITIONER

## 2021-08-23 PROCEDURE — 93010 ELECTROCARDIOGRAM REPORT: CPT | Performed by: NURSE PRACTITIONER

## 2021-08-23 PROCEDURE — G8419 CALC BMI OUT NRM PARAM NOF/U: HCPCS | Performed by: NURSE PRACTITIONER

## 2021-08-23 PROCEDURE — G8536 NO DOC ELDER MAL SCRN: HCPCS | Performed by: NURSE PRACTITIONER

## 2021-08-23 PROCEDURE — G9899 SCRN MAM PERF RSLTS DOC: HCPCS | Performed by: NURSE PRACTITIONER

## 2021-08-23 PROCEDURE — G0463 HOSPITAL OUTPT CLINIC VISIT: HCPCS | Performed by: NURSE PRACTITIONER

## 2021-08-23 PROCEDURE — 99214 OFFICE O/P EST MOD 30 MIN: CPT | Performed by: NURSE PRACTITIONER

## 2021-08-23 RX ORDER — ACETAMINOPHEN 500 MG
2000 TABLET ORAL DAILY
COMMUNITY

## 2021-08-23 NOTE — PROGRESS NOTES
1. Have you been to the ER, urgent care clinic since your last visit? Hospitalized since your last visit? No    2. Have you seen or consulted any other health care providers outside of the [address] [place] [address] since your last visit? Include any pap smears or colon screening.  No           Chief Complaint   Patient presents with    Varicose Veins     C/O  CP last few days, Rapid heart rate, SOB

## 2021-08-31 DIAGNOSIS — M79.7 FIBROMYALGIA: ICD-10-CM

## 2021-08-31 RX ORDER — PREGABALIN 75 MG/1
CAPSULE ORAL
Qty: 180 CAPSULE | Refills: 0 | Status: SHIPPED | OUTPATIENT
Start: 2021-08-31 | End: 2021-12-08

## 2021-09-03 ENCOUNTER — CLINICAL SUPPORT (OUTPATIENT)
Dept: CARDIOLOGY CLINIC | Age: 66
End: 2021-09-03
Payer: MEDICARE

## 2021-09-03 ENCOUNTER — ANCILLARY PROCEDURE (OUTPATIENT)
Dept: CARDIOLOGY CLINIC | Age: 66
End: 2021-09-03
Payer: MEDICARE

## 2021-09-03 VITALS
DIASTOLIC BLOOD PRESSURE: 82 MMHG | HEIGHT: 62 IN | WEIGHT: 140 LBS | BODY MASS INDEX: 25.76 KG/M2 | SYSTOLIC BLOOD PRESSURE: 134 MMHG

## 2021-09-03 DIAGNOSIS — R07.89 ATYPICAL CHEST PAIN: ICD-10-CM

## 2021-09-03 DIAGNOSIS — I10 ESSENTIAL HYPERTENSION: ICD-10-CM

## 2021-09-03 DIAGNOSIS — E78.2 MIXED HYPERLIPIDEMIA: ICD-10-CM

## 2021-09-03 DIAGNOSIS — I83.813 VARICOSE VEINS OF BOTH LOWER EXTREMITIES WITH PAIN: ICD-10-CM

## 2021-09-03 DIAGNOSIS — R00.2 INTERMITTENT PALPITATIONS: ICD-10-CM

## 2021-09-03 DIAGNOSIS — F17.200 CURRENT SMOKER: ICD-10-CM

## 2021-09-03 DIAGNOSIS — R00.2 INTERMITTENT PALPITATIONS: Primary | ICD-10-CM

## 2021-09-03 DIAGNOSIS — I87.2 VENOUS INSUFFICIENCY OF BOTH LOWER EXTREMITIES: ICD-10-CM

## 2021-09-03 DIAGNOSIS — R00.2 PALPITATIONS: ICD-10-CM

## 2021-09-03 PROCEDURE — 93228 REMOTE 30 DAY ECG REV/REPORT: CPT | Performed by: INTERNAL MEDICINE

## 2021-09-03 PROCEDURE — 93351 STRESS TTE COMPLETE: CPT | Performed by: INTERNAL MEDICINE

## 2021-09-03 NOTE — PROGRESS NOTES
Patient received a 30 day event monitor. Instructions given verbally as well as an instruction sheet. Pt verbalized understanding.     [facility] Event Monitoring

## 2021-09-07 ENCOUNTER — TELEPHONE (OUTPATIENT)
Dept: CARDIOLOGY CLINIC | Age: 66
End: 2021-09-07

## 2021-09-07 DIAGNOSIS — M79.7 FIBROMYALGIA: ICD-10-CM

## 2021-09-07 LAB
STRESS ANGINA INDEX: 0
STRESS BASELINE DIAS BP: 70 MMHG
STRESS BASELINE HR: 82 BPM
STRESS BASELINE SYS BP: 140 MMHG
STRESS ESTIMATED WORKLOAD: 5 METS
STRESS EXERCISE DUR MIN: NORMAL MIN:SEC
STRESS O2 SAT REST: 97 %
STRESS PEAK DIAS BP: 80 MMHG
STRESS PEAK SYS BP: 190 MMHG
STRESS PERCENT HR ACHIEVED: 87 %
STRESS POST PEAK HR: 134 BPM
STRESS RATE PRESSURE PRODUCT: NORMAL BPM*MMHG
STRESS SR DUKE TREADMILL SCORE: 3
STRESS ST DEPRESSION: 0 MM
STRESS ST ELEVATION: 0 MM
STRESS STAGE 1 DURATION: NORMAL MIN:SEC
STRESS STAGE 1 HR: 126 BPM
STRESS STAGE 2 DURATION: NORMAL MIN:SEC
STRESS STAGE 2 HR: 133 BPM
STRESS STAGE RECOVERY 1 BP: NORMAL MMHG
STRESS STAGE RECOVERY 1 DURATION: NORMAL MIN:SEC
STRESS STAGE RECOVERY 1 HR: 91 BPM
STRESS TARGET HR: 154 BPM

## 2021-09-07 RX ORDER — TIZANIDINE 4 MG/1
TABLET ORAL
Qty: 90 TABLET | Refills: 0 | Status: SHIPPED | OUTPATIENT
Start: 2021-09-07 | End: 2021-10-05

## 2021-09-07 NOTE — TELEPHONE ENCOUNTER
Spoke with patient. Verified patient with two patient identifiers. Advised pt on stress test results  Patient verbalized understanding.

## 2021-09-07 NOTE — TELEPHONE ENCOUNTER
----- Message from Robbin Baltazar NP sent at 9/7/2021 11:11 AM EDT -----  Stress test no active blood flow problem.

## 2021-09-10 ENCOUNTER — TELEPHONE (OUTPATIENT)
Dept: CARDIOLOGY CLINIC | Age: 66
End: 2021-09-10

## 2021-09-10 NOTE — TELEPHONE ENCOUNTER
Message  Received: Today  Lloyd Barton sent to Earleen Bence, LPN  Caller: Unspecified (Today,  8:17 AM)  Pt will come in today at 2:00 pm with  to go over instructions again.  Thanks         Noted, thanks.

## 2021-09-10 NOTE — TELEPHONE ENCOUNTER
----- Message from Angelia Huber MD sent at 9/9/2021  2:14 PM EDT -----  Inform her stress test is k. Called pt,verified pt with two pt identifiers, advised pt her stress test is normal. Pt verbalized understanding. Pt then stated her heart monitor kept beeping last night-the patch, she couldn't get it to stick correctly. She would like to come into office with her  to help her. I advised her I would have Israel Rubin call her first and she can go from there. Pt verbalized understanding.

## 2021-09-27 RX ORDER — METOPROLOL SUCCINATE 25 MG/1
TABLET, EXTENDED RELEASE ORAL
Qty: 90 TABLET | Refills: 0 | Status: SHIPPED | OUTPATIENT
Start: 2021-09-27 | End: 2021-10-11

## 2021-10-05 DIAGNOSIS — M79.7 FIBROMYALGIA: ICD-10-CM

## 2021-10-05 RX ORDER — TIZANIDINE 4 MG/1
TABLET ORAL
Qty: 90 TABLET | Refills: 0 | Status: SHIPPED | OUTPATIENT
Start: 2021-10-05 | End: 2021-10-23

## 2021-10-22 ENCOUNTER — TELEPHONE (OUTPATIENT)
Dept: CARDIOLOGY CLINIC | Age: 66
End: 2021-10-22

## 2021-10-23 DIAGNOSIS — M79.7 FIBROMYALGIA: ICD-10-CM

## 2021-10-23 RX ORDER — TIZANIDINE 4 MG/1
TABLET ORAL
Qty: 90 TABLET | Refills: 0 | Status: SHIPPED | OUTPATIENT
Start: 2021-10-23 | End: 2021-11-23

## 2021-11-23 DIAGNOSIS — M79.7 FIBROMYALGIA: ICD-10-CM

## 2021-11-23 RX ORDER — TIZANIDINE 4 MG/1
TABLET ORAL
Qty: 90 TABLET | Refills: 0 | Status: SHIPPED | OUTPATIENT
Start: 2021-11-23 | End: 2021-12-29

## 2021-12-07 DIAGNOSIS — M79.7 FIBROMYALGIA: ICD-10-CM

## 2021-12-08 RX ORDER — PREGABALIN 75 MG/1
CAPSULE ORAL
Qty: 180 CAPSULE | Refills: 0 | Status: SHIPPED | OUTPATIENT
Start: 2021-12-08 | End: 2022-03-15

## 2021-12-29 DIAGNOSIS — M79.7 FIBROMYALGIA: ICD-10-CM

## 2021-12-29 RX ORDER — TIZANIDINE 4 MG/1
TABLET ORAL
Qty: 90 TABLET | Refills: 0 | Status: SHIPPED | OUTPATIENT
Start: 2021-12-29

## 2022-01-18 DIAGNOSIS — M25.552 PAIN IN LEFT HIP: ICD-10-CM

## 2022-01-18 RX ORDER — BACLOFEN 10 MG/1
TABLET ORAL
Qty: 270 TABLET | Refills: 1 | Status: SHIPPED | OUTPATIENT
Start: 2022-01-18 | End: 2022-05-03

## 2022-02-23 NOTE — PROGRESS NOTES
[address], [address] [facility]  [phone]     Subjective:      Gentry Dominguez is a 77 y.o. female is here for routine f/u. Pmhx HTN, fibromyalgia, venous insufficiency, ulcerative colitis, HLD and tobacco abuse. Last seen by us in 8/2021. At that time c/o Having intermittent palpitation with some chest discomfort. Sob unchanged. Today,  Continues to smoke, 0.5 - 1 PPD. The patient denies chest pain/ shortness of breath, orthopnea, PND, LE edema, palpitations, syncope, presyncope or fatigue.        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) 10/23/2020    Tobacco use 05/13/2019    Vitamin D deficiency 01/02/2018    Varicose veins of both lower extremities with pain 03/21/2017    Varicose veins of both legs with edema 03/21/2017    Venous insufficiency of both lower extremities 12/06/2016    Lower extremity edema 11/11/2016    Palpitations 11/01/2016    Nicotine abuse 10/28/2016    Ulcerative colitis ([facility] [address] 03/05/2013    Anxiety 03/05/2013    Fibromyalgia 01/31/2012    Back pain 06/12/2011    Cervical neck pain with evidence of disc disease 06/12/2011      Sussy Noriega MD  Past Medical History:   Diagnosis Date    Breast cancer [facility]) 2002    right breast    Family history of skin cancer     Sister - melanoma on shoulder - removed    Gastrointestinal disorder     proctitis    Other ill-defined conditions(799.89)     hereniated disc neck & lower spine    Other ill-defined conditions(799.89)     fibromyalgia    Other ill-defined conditions(799.89)     pleurisy    Sun-damaged skin     Sunburn, blistering     Tanning bed exposure       Past Surgical History:   Procedure Laterality Date    HX BREAST LUMPECTOMY Right 2002    cancer    HX GYN  [id number]    c sections    HX HYSTERECTOMY      HX ORTHOPAEDIC left foot sx    HX OTHER SURGICAL      [place] BREAST SURGERY PROCEDURE UNLISTED      breast cancer     Allergies   Allergen Reactions    Ciprofloxacin Other (comments)    Mesalamine Other (comments)    Naproxen Unknown (comments)     Bleeding in stomach      Nsaids (Non-Steroidal Anti-Inflammatory Drug) Other (comments)      Family History   Problem Relation Age of Onset    Heart Disease Mother     Heart Surgery Mother     Liver Disease Father     Other Sister         neuroma    Cancer Sister     Other Other         muscular dystrophy    Coronary Art Dis Maternal Grandfather     Heart Attack Maternal Grandfather     Breast Cancer Paternal Aunt     Cancer Maternal Aunt     Cancer Maternal Grandmother     Cancer Cousin       Social History     Socioeconomic History    Marital status:      Spouse name: Not on file    Number of children: Not on file    Years of education: Not on file    Highest education level: Not on file   Occupational History    Not on file   Tobacco Use    Smoking status: Current Every Day Smoker     Packs/day: 1.00     Types: Cigarettes    Smokeless tobacco: Never Used   Vaping Use    Vaping Use: Never used   Substance and Sexual Activity    Alcohol use: Yes     Alcohol/week: 2.5 standard drinks     Types: 3 Glasses of wine per week    Drug use: No    Sexual activity: Not on file   Other Topics Concern    Not on file   Social History Narrative    Not on file     Social Determinants of Health     Financial Resource Strain:     Difficulty of Paying Living Expenses: Not on file   Food Insecurity:     Worried About Running Out of Food in the Last Year: Not on file    Ophelia of Food in the Last Year: Not on file   Transportation Needs:     Lack of Transportation (Medical): Not on file    Lack of Transportation (Non-Medical):  Not on file   Physical Activity:     Days of Exercise per Week: Not on file    Minutes of Exercise per Session: Not on file   Stress:  Feeling of Stress : Not on file   Social Connections:     Frequency of Communication with Friends and Family: Not on file    Frequency of Social Gatherings with Friends and Family: Not on file    Attends Alevism Services: Not on file    Active Member of Clubs or Organizations: Not on file    Attends Club or Organization Meetings: Not on file    Marital Status: Not on file   Intimate Partner Violence:     Fear of Current or Ex-Partner: Not on file    Emotionally Abused: Not on file    Physically Abused: Not on file    Sexually Abused: Not on file   Housing Stability:     Unable to Pay for Housing in the Last Year: Not on file    Number of Places Lived in the Last Year: Not on file    Unstable Housing in the Last Year: Not on file      Current Outpatient Medications   Medication Sig    baclofen (LIORESAL) 10 mg tablet TAKE 1 TABLET BY MOUTH THREE TIMES A DAY    tiZANidine (ZANAFLEX) 4 mg tablet TAKE 1 TABLET BY MOUTH THREE TIMES DAILY AS NEEDED FOR FIBROMYALGIA SYMPTOMS    pregabalin (LYRICA) 75 mg capsule TAKE 1 CAPSULE BY MOUTH TWICE A DAY    metoprolol succinate (TOPROL-XL) 25 mg XL tablet TAKE 1 TABLET BY MOUTH EVERY DAY AT NIGHT    cholecalciferol (VITAMIN D3) (2,000 UNITS /50 MCG) cap capsule Vitamin D3 50 mcg (2,000 unit) capsule   Take by oral route.  mecobalamin, vitamin B12, 1,000 mcg chew B12 Active 1,000 mcg chewable tablet   Take by oral route.  OTHER Take  by mouth daily. Prevagen    traZODone (DESYREL) 50 mg tablet Take 0.5 Tabs by mouth nightly.  mesalamine DR (ASACOL HD) 800 mg DR tablet TAKE 1 TABLET BY MOUTH 3 TIMES DAILY (Patient not taking: Reported on 8/23/2021)    melatonin 5 mg cap capsule Take 10 mg by mouth nightly.  ACETAMINOPHEN/DIPHENHYDRAMINE (TYLENOL PM PO) Take  by mouth nightly.  acetaminophen (TYLENOL EXTRA STRENGTH) 500 mg tablet Take  by mouth every six (6) hours as needed for Pain.      No current facility-administered medications for this visit. Review of Symptoms:  11 systems reviewed, negative other than as stated in the HPI    Physical ExamPhysical Exam:    There were no vitals filed for this visit. There is no height or weight on file to calculate BMI. General PE  Gen:  NAD  Mental Status - Alert. General Appearance - Not in acute distress. HEENT:  PERRL, no carotid bruits or JVD  Chest and Lung Exam   Inspection: Accessory muscles - No use of accessory muscles in breathing. Auscultation:   Breath sounds: - Normal.   Cardiovascular   Inspection: Jugular vein - Bilateral - Inspection Normal.   Palpation/Percussion:   Apical Impulse: - Normal.   Auscultation: Rhythm - Regular. Heart Sounds - S1 WNL and S2 WNL. No S3 or S4. Murmurs & Other Heart Sounds: Auscultation of the heart reveals - No Murmurs. Peripheral Vascular   Upper Extremity: Inspection - Bilateral - No Cyanotic nailbeds or Digital clubbing. Lower Extremity:   Palpation: Edema - Bilateral - No edema. Abdomen:   Soft, non-tender, bowel sounds are active.   Neuro: A&O times 3, CN and motor grossly WNL    Labs:   Lab Results   Component Value Date/Time    Cholesterol, total 221 (H) 03/04/2021 03:20 PM    Cholesterol, total 207 (H) 05/13/2019 02:13 PM    Cholesterol, total 192 08/31/2018 12:15 PM    Cholesterol, total 187 12/29/2017 10:23 AM    Cholesterol, total 226 (H) 02/16/2016 02:36 PM    HDL Cholesterol 94 03/04/2021 03:20 PM    HDL Cholesterol 81 05/13/2019 02:13 PM    HDL Cholesterol 77 08/31/2018 12:15 PM    HDL Cholesterol 50 12/29/2017 10:23 AM    HDL Cholesterol 91 02/16/2016 02:36 PM    LDL, calculated 108 (H) 03/04/2021 03:20 PM    LDL, calculated 107 (H) 05/13/2019 02:13 PM    LDL, calculated 96 08/31/2018 12:15 PM    LDL, calculated 113 (H) 12/29/2017 10:23 AM    LDL, calculated 111 (H) 02/16/2016 02:36 PM    LDL, calculated 116 (H) 01/31/2012 10:53 AM    Triglyceride 112 03/04/2021 03:20 PM    Triglyceride 93 05/13/2019 02:13 PM    Triglyceride 93 08/31/2018 12:15 PM    Triglyceride 121 12/29/2017 10:23 AM    Triglyceride 118 02/16/2016 02:36 PM     Lab Results   Component Value Date/Time    CK 94 04/25/2012 04:45 PM     Lab Results   Component Value Date/Time    Sodium 137 03/04/2021 03:20 PM    Potassium 4.0 03/04/2021 03:20 PM    Chloride 101 03/04/2021 03:20 PM    CO2 21 03/04/2021 03:20 PM    Anion gap 6 04/25/2012 04:45 PM    Glucose 90 03/04/2021 03:20 PM    BUN 20 03/04/2021 03:20 PM    Creatinine 0.57 03/04/2021 03:20 PM    BUN/Creatinine ratio 35 (H) 03/04/2021 03:20 PM    GFR est  03/04/2021 03:20 PM    GFR est non-AA 98 03/04/2021 03:20 PM    Calcium 9.5 03/04/2021 03:20 PM    Bilirubin, total 0.5 03/04/2021 03:20 PM    Alk. phosphatase 103 03/04/2021 03:20 PM    Protein, total 7.2 03/04/2021 03:20 PM    Albumin 4.5 03/04/2021 03:20 PM    Globulin 3.4 04/25/2012 04:45 PM    A-G Ratio 1.7 03/04/2021 03:20 PM    ALT (SGPT) 27 03/04/2021 03:20 PM       EKG:  SR       Assessment:     Assessment:        ICD-10-CM ICD-9-CM    1. Intermittent palpitations  R00.2 785.1    2. Venous insufficiency of both lower extremities  I87.2 459.81    3. Palpitations  R00.2 785.1    4. Atypical chest pain  R07.89 786.59    5. Essential hypertension  I10 401.9    6. Mixed hyperlipidemia  E78.2 272.2    7. Tobacco use  Z72.0 305.1        No orders of the defined types were placed in this encounter. Plan:     Hx atypical cp  Normal stress echo in 9/2021, 5/2019  Coronary calcium scoring would be reassuring if totally normal and threshold for further testing/treatment would be decreased if high. Palpitation, intermittent  TSH ok 3/2021  1 mos event 11/2021: < 1% PVC/PAC burded  No further issued with increased dose of BB    HTN  Controlled with BB    Venous insufficiency  Bilateral GSV and left SSV reflux in 11/2016  Stable.     LE arterial duplex in 2016 : No significant occlusive disease    HLD  3/2021  Labs and lipids per PCP    Tobacco abuse  Tobacco cessation discussed with patients       Continue current care and f/u in 1 yr        Radha Dunne, NP

## 2022-02-24 ENCOUNTER — OFFICE VISIT (OUTPATIENT)
Dept: CARDIOLOGY CLINIC | Age: 67
End: 2022-02-24
Payer: MEDICARE

## 2022-02-24 VITALS
WEIGHT: 143.4 LBS | BODY MASS INDEX: 26.39 KG/M2 | HEART RATE: 79 BPM | SYSTOLIC BLOOD PRESSURE: 122 MMHG | RESPIRATION RATE: 18 BRPM | HEIGHT: 62 IN | OXYGEN SATURATION: 98 % | DIASTOLIC BLOOD PRESSURE: 70 MMHG

## 2022-02-24 DIAGNOSIS — R00.2 PALPITATIONS: ICD-10-CM

## 2022-02-24 DIAGNOSIS — R00.2 INTERMITTENT PALPITATIONS: Primary | ICD-10-CM

## 2022-02-24 DIAGNOSIS — R07.89 ATYPICAL CHEST PAIN: ICD-10-CM

## 2022-02-24 DIAGNOSIS — E78.2 MIXED HYPERLIPIDEMIA: ICD-10-CM

## 2022-02-24 DIAGNOSIS — Z72.0 TOBACCO USE: ICD-10-CM

## 2022-02-24 DIAGNOSIS — I87.2 VENOUS INSUFFICIENCY OF BOTH LOWER EXTREMITIES: ICD-10-CM

## 2022-02-24 DIAGNOSIS — I10 ESSENTIAL HYPERTENSION: ICD-10-CM

## 2022-02-24 PROCEDURE — G8419 CALC BMI OUT NRM PARAM NOF/U: HCPCS | Performed by: NURSE PRACTITIONER

## 2022-02-24 PROCEDURE — 3017F COLORECTAL CA SCREEN DOC REV: CPT | Performed by: NURSE PRACTITIONER

## 2022-02-24 PROCEDURE — G8754 DIAS BP LESS 90: HCPCS | Performed by: NURSE PRACTITIONER

## 2022-02-24 PROCEDURE — G8752 SYS BP LESS 140: HCPCS | Performed by: NURSE PRACTITIONER

## 2022-02-24 PROCEDURE — 1101F PT FALLS ASSESS-DOCD LE1/YR: CPT | Performed by: NURSE PRACTITIONER

## 2022-02-24 PROCEDURE — 99214 OFFICE O/P EST MOD 30 MIN: CPT | Performed by: NURSE PRACTITIONER

## 2022-02-24 PROCEDURE — G0463 HOSPITAL OUTPT CLINIC VISIT: HCPCS | Performed by: NURSE PRACTITIONER

## 2022-02-24 PROCEDURE — G8427 DOCREV CUR MEDS BY ELIG CLIN: HCPCS | Performed by: NURSE PRACTITIONER

## 2022-02-24 PROCEDURE — G9899 SCRN MAM PERF RSLTS DOC: HCPCS | Performed by: NURSE PRACTITIONER

## 2022-02-24 PROCEDURE — 93005 ELECTROCARDIOGRAM TRACING: CPT | Performed by: NURSE PRACTITIONER

## 2022-02-24 PROCEDURE — G8432 DEP SCR NOT DOC, RNG: HCPCS | Performed by: NURSE PRACTITIONER

## 2022-02-24 PROCEDURE — 93010 ELECTROCARDIOGRAM REPORT: CPT | Performed by: NURSE PRACTITIONER

## 2022-02-24 PROCEDURE — G8536 NO DOC ELDER MAL SCRN: HCPCS | Performed by: NURSE PRACTITIONER

## 2022-02-24 PROCEDURE — G8400 PT W/DXA NO RESULTS DOC: HCPCS | Performed by: NURSE PRACTITIONER

## 2022-02-24 PROCEDURE — 1090F PRES/ABSN URINE INCON ASSESS: CPT | Performed by: NURSE PRACTITIONER

## 2022-02-24 RX ORDER — METOPROLOL SUCCINATE 25 MG/1
25 TABLET, EXTENDED RELEASE ORAL
Qty: 90 TABLET | Refills: 3 | Status: SHIPPED | OUTPATIENT
Start: 2022-02-24 | End: 2022-10-17 | Stop reason: SDUPTHER

## 2022-02-24 RX ORDER — INFLIXIMAB 100 MG/10ML
INJECTION, POWDER, LYOPHILIZED, FOR SOLUTION INTRAVENOUS
COMMUNITY

## 2022-02-24 NOTE — PROGRESS NOTES
1. Have you been to the ER, urgent care clinic since your last visit? Hospitalized since your last visit?  6 mo appt, HTN,  [facility] ER 10- colitis, UTI       2. Have you seen or consulted any other health care providers outside of the [address] [place] [address] since your last visit? Include any pap smears or colon screening. No    Chief Complaint   Patient presents with    Hypertension     6 mo appt. No cardiac concerns.

## 2022-03-15 DIAGNOSIS — M79.7 FIBROMYALGIA: ICD-10-CM

## 2022-03-15 RX ORDER — PREGABALIN 75 MG/1
CAPSULE ORAL
Qty: 180 CAPSULE | Refills: 0 | Status: SHIPPED | OUTPATIENT
Start: 2022-03-15 | End: 2022-06-20

## 2022-03-19 PROBLEM — E55.9 VITAMIN D DEFICIENCY: Status: ACTIVE | Noted: 2018-01-02

## 2022-03-19 PROBLEM — I83.813 VARICOSE VEINS OF BOTH LOWER EXTREMITIES WITH PAIN: Status: ACTIVE | Noted: 2017-03-21

## 2022-03-19 PROBLEM — D05.11 NEOPLASM OF RIGHT BREAST, PRIMARY TUMOR STAGING CATEGORY TIS: DUCTAL CARCINOMA IN SITU (DCIS): Status: ACTIVE | Noted: 2020-10-23

## 2022-03-19 PROBLEM — Z72.0 TOBACCO USE: Status: ACTIVE | Noted: 2019-05-13

## 2022-03-20 PROBLEM — Z80.3 FH: BREAST CANCER: Status: ACTIVE | Noted: 2020-10-23

## 2022-03-20 PROBLEM — I83.893 VARICOSE VEINS OF BOTH LEGS WITH EDEMA: Status: ACTIVE | Noted: 2017-03-21

## 2022-05-31 DIAGNOSIS — M79.7 FIBROMYALGIA: ICD-10-CM

## 2022-05-31 DIAGNOSIS — G47.00 INSOMNIA, UNSPECIFIED TYPE: ICD-10-CM

## 2022-05-31 RX ORDER — TRAZODONE HYDROCHLORIDE 50 MG/1
TABLET ORAL
Qty: 45 TABLET | Refills: 0 | Status: SHIPPED | OUTPATIENT
Start: 2022-05-31

## 2022-06-20 DIAGNOSIS — M79.7 FIBROMYALGIA: ICD-10-CM

## 2022-06-20 RX ORDER — PREGABALIN 75 MG/1
CAPSULE ORAL
Qty: 180 CAPSULE | Refills: 0 | Status: SHIPPED | OUTPATIENT
Start: 2022-06-20 | End: 2022-10-24

## 2022-10-17 DIAGNOSIS — M79.7 FIBROMYALGIA: ICD-10-CM

## 2022-10-17 DIAGNOSIS — G47.00 INSOMNIA, UNSPECIFIED TYPE: ICD-10-CM

## 2022-10-17 RX ORDER — METOPROLOL SUCCINATE 25 MG/1
25 TABLET, EXTENDED RELEASE ORAL
Qty: 30 TABLET | Refills: 0 | Status: SHIPPED | OUTPATIENT
Start: 2022-10-17 | End: 2022-10-20 | Stop reason: SDUPTHER

## 2022-10-17 RX ORDER — TRAZODONE HYDROCHLORIDE 50 MG/1
TABLET ORAL
Qty: 45 TABLET | Refills: 0 | OUTPATIENT
Start: 2022-10-17

## 2022-10-17 NOTE — TELEPHONE ENCOUNTER
----- Message from [facility] sent at 10/17/2022  9:40 AM EDT -----  Subject: Refill Request    QUESTIONS  Name of Medication? metoprolol succinate (TOPROL-XL) 25 mg XL tablet  Patient-reported dosage and instructions? bid   How many days do you have left? 1  Preferred Pharmacy? [id number]  Pharmacy phone number (if available)? [phone]  Additional Information for Provider? 90 day supply if possible  ---------------------------------------------------------------------------  --------------  CALL BACK INFO  What is the best way for the office to contact you? OK to leave message on   voicemail  Preferred Call Back Phone Number? [phone]  ---------------------------------------------------------------------------  --------------  SCRIPT ANSWERS  Relationship to Patient?  Self

## 2022-10-20 NOTE — TELEPHONE ENCOUNTER
Please advise pt that this is an extended release medication, normal dosing is once daily. Pt has only been prescribed this medication once daily for the last several years. Was it changed by another provider since her last visit?     Pt is also overdue for a visit - last seen 3/4/21

## 2022-10-20 NOTE — TELEPHONE ENCOUNTER
Refill Request (patient callingl)   -pt states that Pt's RX is written incorrectly  -Pt states that she takes this medication twice a day (am and pm)   -please call pt when RX has been sent   -Pt only has two pills left     Requested Prescriptions     Pending Prescriptions Disp Refills    metoprolol succinate (TOPROL-XL) 25 mg XL tablet 30 Tablet 0     Sig: Take 1 Tablet by mouth nightly.        Thank You

## 2022-10-21 RX ORDER — METOPROLOL SUCCINATE 25 MG/1
25 TABLET, EXTENDED RELEASE ORAL 2 TIMES DAILY
Qty: 60 TABLET | Refills: 5 | Status: SHIPPED | OUTPATIENT
Start: 2022-10-21

## 2022-10-22 DIAGNOSIS — M79.7 FIBROMYALGIA: ICD-10-CM

## 2022-10-24 RX ORDER — PREGABALIN 75 MG/1
CAPSULE ORAL
Qty: 180 CAPSULE | Refills: 0 | Status: SHIPPED | OUTPATIENT
Start: 2022-10-24

## 2022-10-25 ENCOUNTER — OFFICE VISIT (OUTPATIENT)
Dept: FAMILY MEDICINE CLINIC | Age: 67
End: 2022-10-25
Payer: MEDICARE

## 2022-10-25 VITALS
WEIGHT: 144.8 LBS | OXYGEN SATURATION: 95 % | SYSTOLIC BLOOD PRESSURE: 135 MMHG | BODY MASS INDEX: 26.65 KG/M2 | RESPIRATION RATE: 16 BRPM | HEIGHT: 62 IN | DIASTOLIC BLOOD PRESSURE: 84 MMHG | HEART RATE: 78 BPM

## 2022-10-25 DIAGNOSIS — E55.9 VITAMIN D DEFICIENCY: ICD-10-CM

## 2022-10-25 DIAGNOSIS — R79.9 ABNORMAL FINDING OF BLOOD CHEMISTRY, UNSPECIFIED: ICD-10-CM

## 2022-10-25 DIAGNOSIS — Z00.00 MEDICARE ANNUAL WELLNESS VISIT, SUBSEQUENT: ICD-10-CM

## 2022-10-25 DIAGNOSIS — M79.7 FIBROMYALGIA: ICD-10-CM

## 2022-10-25 DIAGNOSIS — K51.919 ULCERATIVE COLITIS WITH COMPLICATION, UNSPECIFIED LOCATION (HCC): ICD-10-CM

## 2022-10-25 DIAGNOSIS — M25.552 PAIN IN LEFT HIP: ICD-10-CM

## 2022-10-25 DIAGNOSIS — Z87.891 PERSONAL HISTORY OF TOBACCO USE, PRESENTING HAZARDS TO HEALTH: Primary | ICD-10-CM

## 2022-10-25 PROCEDURE — G8752 SYS BP LESS 140: HCPCS | Performed by: FAMILY MEDICINE

## 2022-10-25 PROCEDURE — 3017F COLORECTAL CA SCREEN DOC REV: CPT | Performed by: FAMILY MEDICINE

## 2022-10-25 PROCEDURE — 1090F PRES/ABSN URINE INCON ASSESS: CPT | Performed by: FAMILY MEDICINE

## 2022-10-25 PROCEDURE — G9899 SCRN MAM PERF RSLTS DOC: HCPCS | Performed by: FAMILY MEDICINE

## 2022-10-25 PROCEDURE — G8427 DOCREV CUR MEDS BY ELIG CLIN: HCPCS | Performed by: FAMILY MEDICINE

## 2022-10-25 PROCEDURE — G8536 NO DOC ELDER MAL SCRN: HCPCS | Performed by: FAMILY MEDICINE

## 2022-10-25 PROCEDURE — G8400 PT W/DXA NO RESULTS DOC: HCPCS | Performed by: FAMILY MEDICINE

## 2022-10-25 PROCEDURE — 1123F ACP DISCUSS/DSCN MKR DOCD: CPT | Performed by: FAMILY MEDICINE

## 2022-10-25 PROCEDURE — G0463 HOSPITAL OUTPT CLINIC VISIT: HCPCS | Performed by: FAMILY MEDICINE

## 2022-10-25 PROCEDURE — G0296 VISIT TO DETERM LDCT ELIG: HCPCS | Performed by: FAMILY MEDICINE

## 2022-10-25 PROCEDURE — G8510 SCR DEP NEG, NO PLAN REQD: HCPCS | Performed by: FAMILY MEDICINE

## 2022-10-25 PROCEDURE — G8417 CALC BMI ABV UP PARAM F/U: HCPCS | Performed by: FAMILY MEDICINE

## 2022-10-25 PROCEDURE — 99213 OFFICE O/P EST LOW 20 MIN: CPT | Performed by: FAMILY MEDICINE

## 2022-10-25 PROCEDURE — G0439 PPPS, SUBSEQ VISIT: HCPCS | Performed by: FAMILY MEDICINE

## 2022-10-25 PROCEDURE — G8754 DIAS BP LESS 90: HCPCS | Performed by: FAMILY MEDICINE

## 2022-10-25 PROCEDURE — 1101F PT FALLS ASSESS-DOCD LE1/YR: CPT | Performed by: FAMILY MEDICINE

## 2022-10-25 RX ORDER — BACLOFEN 10 MG/1
10 TABLET ORAL 3 TIMES DAILY
Qty: 270 TABLET | Refills: 3 | Status: SHIPPED | OUTPATIENT
Start: 2022-10-25

## 2022-10-25 NOTE — LETTER
11/4/2022 1:16 PM          Ms. Schmitz [address] [address]                 Dear Ms. Young Zavala,     I have reviewed your labs and they are normal for you based on my review of your medical record. Earjeffrey Big you for letting me participate in your care. Sincerely,      Vladimir Bernal MD          Results for orders placed or performed in visit on 10/25/22   VITAMIN D, 25 HYDROXY   Result Value Ref Range    Vitamin D 25-Hydroxy 31.2 30 - 100 ng/mL   TSH 3RD GENERATION   Result Value Ref Range    TSH 0.92 0.36 - 3.74 uIU/mL   HEMOGLOBIN A1C WITH EAG   Result Value Ref Range    Hemoglobin A1c 5.4 4.0 - 5.6 %    Est. average glucose 108 mg/dL   LIPID PANEL   Result Value Ref Range    Cholesterol, total 203 (H) <200 MG/DL    Triglyceride 104 <150 MG/DL    HDL Cholesterol 76 MG/DL    LDL, calculated 106.2 (H) 0 - 100 MG/DL    VLDL, calculated 20.8 MG/DL    CHOL/HDL Ratio 2.7 0.0 - 5.0     CBC W/O DIFF   Result Value Ref Range    WBC 7.7 3.6 - 11.0 K/uL    RBC 3.78 (L) 3.80 - 5.20 M/uL    HGB 13.6 11.5 - 16.0 g/dL    HCT 41.0 35.0 - 47.0 %    .5 (H) 80.0 - 99.0 FL    MCH 36.0 (H) 26.0 - 34.0 PG    MCHC 33.2 30.0 - 36.5 g/dL    RDW 14.2 11.5 - 14.5 %    PLATELET 059 785 - 744 K/uL    MPV 11.8 8.9 - 12.9 FL    NRBC 0.0 0  WBC    ABSOLUTE NRBC 0.00 0.00 - 4.44 K/uL   METABOLIC PANEL, COMPREHENSIVE   Result Value Ref Range    Sodium 137 136 - 145 mmol/L    Potassium 4.2 3.5 - 5.1 mmol/L    Chloride 104 97 - 108 mmol/L    CO2 25 21 - 32 mmol/L    Anion gap 8 5 - 15 mmol/L    Glucose 87 65 - 100 mg/dL    BUN 13 6 - 20 MG/DL    Creatinine 0.52 (L) 0.55 - 1.02 MG/DL    BUN/Creatinine ratio 25 (H) 12 - 20      eGFR >60 >60 ml/min/1.73m2    Calcium 9.2 8.5 - 10.1 MG/DL    Bilirubin, total 0.5 0.2 - 1.0 MG/DL    ALT (SGPT) 26 12 - 78 U/L    AST (SGOT) 32 15 - 37 U/L    Alk.  phosphatase 85 45 - 117 U/L    Protein, total 7.7 6.4 - 8.2 g/dL    Albumin 3.9 3.5 - 5.0 g/dL    Globulin 3.8 2.0 - 4.0 g/dL    A-G Ratio 1.0 (L) 1.1 - 2.2     HM PAP SMEAR   Result Value Ref Range    Pap Smear, External normal

## 2022-10-25 NOTE — PROGRESS NOTES
Chief Complaint   Patient presents with    Medication Refill     Pt has had several orthopedic issues, low back and hips. Pt has seen her gyn for PAP, Mammogram and DEXA- was diagnosed with osteopenia. Pt sees Dr. Bossman Post for ulcerative colitis, is getting infusions. Subjective: (As above and below)     Chief Complaint   Patient presents with    Medication Refill     she is a 79y.o. year old female who presents for evaluation. Reviewed PmHx, RxHx, FmHx, SocHx, AllgHx and updated in chart. Review of Systems - negative except as listed above    Objective:     Vitals:    10/25/22 1100   BP: 135/84   Pulse: 78   Resp: 16   SpO2: 95%   Weight: 144 lb 12.8 oz (65.7 kg)   Height: 5' 2\" (1.575 m)     Physical Examination: General appearance - alert, well appearing, and in no distress  Mental status - normal mood, behavior, speech, dress, motor activity, and thought processes  Ears - bilateral TM's and external ear canals normal  Chest - clear to auscultation, no wheezes, rales or rhonchi, symmetric air entry  Heart - normal rate, regular rhythm, normal S1, S2, no murmurs, rubs, clicks or gallops  Musculoskeletal - no joint tenderness, deformity or swelling  Extremities - peripheral pulses normal, no pedal edema, no clubbing or cyanosis    Assessment/ Plan:   1. Ulcerative colitis with complication, unspecified location [facility])  -followed by Arnaldo Villalba, records requested     2. Personal history of tobacco use, presenting hazards to health  -discussed as below  - CT LOW DOSE LUNG CANCER SCREENING; Future    3. Medicare annual wellness visit, subsequent  -see below    4. Vitamin D deficiency  -check labs  - VITAMIN D, 25 HYDROXY; Future    5. Fibromyalgia  -check labs  - METABOLIC PANEL, COMPREHENSIVE; Future  - CBC W/O DIFF; Future  - LIPID PANEL; Future  - HEMOGLOBIN A1C WITH EAG; Future  - TSH 3RD GENERATION; Future  - VITAMIN D, 25 HYDROXY; Future    6.  Abnormal finding of blood chemistry, unspecified  - METABOLIC PANEL, COMPREHENSIVE; Future  - CBC W/O DIFF; Future  - LIPID PANEL; Future  - HEMOGLOBIN A1C WITH EAG; Future  - TSH 3RD GENERATION; Future  - VITAMIN D, 25 HYDROXY; Future       I have discussed the diagnosis with the patient and the intended plan as seen in the above orders. The patient has received an after-visit summary and questions were answered concerning future plans. Medication Side Effects and Warnings were discussed with patient: yes  Patient Labs were reviewed: yes  Patient Past Records were reviewed:  yes    Desi Nelson M.D. Discussed with the patient the current USPSTF guidelines released March 9, 2021 for screening for lung cancer. For adults aged 48 to [de-identified] years who have a 20 pack-year smoking history and currently smoke or have quit within the past 15 years the grade B recommendation is to:  Screen for lung cancer with low-dose computed tomography (LDCT) every year. Stop screening once a person has not smoked for 15 years or has a health problem that limits life expectancy or the ability to have lung surgery. The patient has a 40 pack-year history of cigarette smoking and currently is smoking. Discussed with patient the risks and benefits of screening, including over-diagnosis, false positive rate, and total radiation exposure. The patient currently exhibits no signs or symptoms suggestive of lung cancer. Discussed with patient the importance of compliance with yearly annual lung cancer screenings and willingness to undergo diagnosis and treatment if screening scan is positive. In addition, the patient was counseled regarding the importance of remaining smoke free and/or total smoking cessation.     Also reviewed the following if the patient has Medicare that as of February 10, 2022, Medicare only covers LDCT screening in patients aged 51-72 with at least a 20 pack-year smoking history who currently smoke or have quit in the last 15 yearsThis is the Subsequent Medicare Annual Wellness Exam, performed 12 months or more after the Initial AWV or the last Subsequent AWV    I have reviewed the patient's medical history in detail and updated the computerized patient record. Assessment/Plan   Education and counseling provided:  Are appropriate based on today's review and evaluation    1. Personal history of tobacco use, presenting hazards to health  -     CT LOW DOSE LUNG CANCER SCREENING; Future  2. Ulcerative colitis with complication, unspecified location ([facility]  3. Medicare annual wellness visit, subsequent  4. Vitamin D deficiency  -     VITAMIN D, 25 HYDROXY; Future  5. Fibromyalgia  -     METABOLIC PANEL, COMPREHENSIVE; Future  -     CBC W/O DIFF; Future  -     LIPID PANEL; Future  -     HEMOGLOBIN A1C WITH EAG; Future  -     TSH 3RD GENERATION; Future  -     VITAMIN D, 25 HYDROXY; Future  6. Abnormal finding of blood chemistry, unspecified  -     METABOLIC PANEL, COMPREHENSIVE; Future  -     CBC W/O DIFF; Future  -     LIPID PANEL; Future  -     HEMOGLOBIN A1C WITH EAG; Future  -     TSH 3RD GENERATION; Future  -     VITAMIN D, 25 HYDROXY; Future       Depression Risk Factor Screening     3 most recent PHQ Screens 10/25/2022   PHQ Not Done -   Little interest or pleasure in doing things Several days   Feeling down, depressed, irritable, or hopeless Several days   Total Score PHQ 2 2       Alcohol & Drug Abuse Risk Screen    Do you average more than 1 drink per night or more than 7 drinks a week:  Yes    On any one occasion in the past three months have you have had more than 3 drinks containing alcohol:  Yes          Functional Ability and Level of Safety    Hearing: The patient needs further evaluation. Activities of Daily Living: The home contains:  shower chair  Patient does total self care      Ambulation: with mild difficulty, uses a cane at times     Fall Risk:  Fall Risk Assessment, last 12 mths 10/25/2022   Able to walk? Yes   Fall in past 12 months?  1 Do you feel unsteady? 1   Are you worried about falling 1   Is TUG test greater than 12 seconds? 0   Is the gait abnormal? 0   Number of falls in past 12 months 1   Fall with injury?  0      Abuse Screen:  Patient is not abused       Cognitive Screening    Has your family/caregiver stated any concerns about your memory: yes - has noticed changes in her memory     Cognitive Screening: MMSE - see scanned documents    Health Maintenance Due     Health Maintenance Due   Topic Date Due    Low dose CT lung screening  Never done    Bone Densitometry (Dexa) Screening  Never done    Colorectal Cancer Screening Combo  01/11/2021    Breast Cancer Screen Mammogram  12/18/2021       Patient Care Team   Patient Care Team:  Gray Dean MD as PCP - General (Family Medicine)  [facility] Empaneled Provider  Nusrat Pang MD (Cardiovascular Disease Physician)  Sruthi Case MD (Cardiovascular Disease Physician)  Irwin Haq MD as Referring Provider (Family Medicine)  Niurka Davis MD as Surgeon (General Surgery)  Felicitas Dasilva, JOHN as Nurse Practitioner (Nurse Practitioner)    History     Patient Active Problem List   Diagnosis Code    Back pain M54.9    Cervical neck pain with evidence of disc disease M50.90    Fibromyalgia M79.7    Ulcerative colitis ([facility] K51.90    Anxiety F41.9    Nicotine abuse Z72.0    Palpitations R00.2    Lower extremity edema R60.0    Venous insufficiency of both lower extremities I87.2    Varicose veins of both lower extremities with pain I83.813    Varicose veins of both legs with edema I83.893    Vitamin D deficiency E55.9    Tobacco use Z72.0    FH: breast cancer Z80.3    Neoplasm of right breast, 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     Past Medical History:   Diagnosis Date    Atrial fibrillation ([address]     Breast cancer ([facility] 2002    right breast    Family history of skin cancer Sister - melanoma on shoulder - removed    Gastrointestinal disorder     proctitis    Murmur     Other ill-defined conditions(799.89)     hereniated disc neck & lower spine    Other ill-defined conditions(799.89)     fibromyalgia    Other ill-defined conditions(799.89)     pleurisy    Sun-damaged skin     Sunburn, blistering     Tanning bed exposure       Past Surgical History:   Procedure Laterality Date    HX BREAST LUMPECTOMY Right 2002    cancer    HX GYN  [id number]    c sections    HX HYSTERECTOMY      HX ORTHOPAEDIC      left foot sx    HX OTHER SURGICAL      PA BREAST SURGERY PROCEDURE UNLISTED      breast cancer     Current Outpatient Medications   Medication Sig Dispense Refill    pregabalin (LYRICA) 75 mg capsule TAKE 1 CAPSULE BY MOUTH TWICE A DAY (Patient taking differently: daily. TAKE 1 CAPSULE BY MOUTH TWICE A DAY) 180 Capsule 0    metoprolol succinate (TOPROL-XL) 25 mg XL tablet Take 1 Tablet by mouth two (2) times a day. 60 Tablet 5    traZODone (DESYREL) 50 mg tablet TAKE 1/2 TABLET BY MOUTH NIGHTLY 45 Tablet 0    baclofen (LIORESAL) 10 mg tablet TAKE 1 TABLET BY MOUTH THREE TIMES A  Tablet 0    inFLIXimab (REMICADE) 100 mg injection Remicade 100 mg intravenous solution   Inject by intravenous route. tiZANidine (ZANAFLEX) 4 mg tablet TAKE 1 TABLET BY MOUTH THREE TIMES DAILY AS NEEDED FOR FIBROMYALGIA SYMPTOMS 90 Tablet 0    cholecalciferol (VITAMIN D3) (2,000 UNITS /50 MCG) cap capsule Take 2,000 Units by mouth daily. melatonin 5 mg cap capsule Take 10 mg by mouth nightly. ACETAMINOPHEN/DIPHENHYDRAMINE (TYLENOL PM PO) Take  by mouth nightly. acetaminophen (TYLENOL) 500 mg tablet Take  by mouth every six (6) hours as needed for Pain.        Allergies   Allergen Reactions    Ciprofloxacin Other (comments)    Dilaudid [Hydromorphone] Anxiety    Mesalamine Other (comments)    Naproxen Unknown (comments)     Bleeding in stomach      Nsaids (Non-Steroidal Anti-Inflammatory Drug) Other (comments)       Family History   Problem Relation Age of Onset    Heart Disease Mother     Heart Surgery Mother     Liver Disease Father     Other Sister         neuroma    Cancer Sister     Other Other         muscular dystrophy    Coronary Art Dis Maternal Grandfather     Heart Attack Maternal Grandfather     Breast Cancer Paternal Aunt     Cancer Maternal Aunt     Cancer Maternal Grandmother     Cancer Cousin      Social History     Tobacco Use    Smoking status: Every Day     Packs/day: 1.00     Years: 40.00     Pack years: 40.00     Types: Cigarettes    Smokeless tobacco: Never   Substance Use Topics    Alcohol use:  Yes     Alcohol/week: 2.5 standard drinks     Types: 3 Glasses of wine per week     Comment: daily         Rafy Red MD

## 2022-10-25 NOTE — PATIENT INSTRUCTIONS
Medicare Wellness Visit, Female     The best way to live healthy is to have a lifestyle where you eat a well-balanced diet, exercise regularly, limit alcohol use, and quit all forms of tobacco/nicotine, if applicable. Regular preventive services are another way to keep healthy. Preventive services (vaccines, screening tests, monitoring & exams) can help personalize your care plan, which helps you manage your own care. Screening tests can find health problems at the earliest stages, when they are easiest to treat. [facility] follows the current, evidence-based guidelines published by the [facility] ([facility]) when recommending preventive services for our patients. Because we follow these guidelines, sometimes recommendations change over time as research supports it. (For example, mammograms used to be recommended annually. Even though Medicare will still pay for an annual mammogram, the newer guidelines recommend a mammogram every two years for women of average risk). Of course, you and your doctor may decide to screen more often for some diseases, based on your risk and your co-morbidities (chronic disease you are already diagnosed with). Preventive services for you include:  - Medicare offers their members a free annual wellness visit, which is time for you and your primary care provider to discuss and plan for your preventive service needs. Take advantage of this benefit every year!  -All adults over the age of 72 should receive the recommended pneumonia vaccines. Current USPSTF guidelines recommend a series of two vaccines for the best pneumonia protection.   -All adults should have a flu vaccine yearly and a tetanus vaccine every 10 years.   -All adults age 48 and older should receive the shingles vaccines (series of two vaccines).       -All adults age 38-68 who are overweight should have a diabetes screening test once every three years.   -All adults born between [birth year] and 1965 should be screened once for Hepatitis C.  -Other screening tests and preventive services for persons with diabetes include: an eye exam to screen for diabetic retinopathy, a kidney function test, a foot exam, and stricter control over your cholesterol.   -Cardiovascular screening for adults with routine risk involves an electrocardiogram (ECG) at intervals determined by your doctor.   -Colorectal cancer screenings should be done for adults age 54-65 with no increased risk factors for colorectal cancer. There are a number of acceptable methods of screening for this type of cancer. Each test has its own benefits and drawbacks. Discuss with your doctor what is most appropriate for you during your annual wellness visit. The different tests include: colonoscopy (considered the best screening method), a fecal occult blood test, a fecal DNA test, and sigmoidoscopy.    -A bone mass density test is recommended when a woman turns 65 to screen for osteoporosis. This test is only recommended one time, as a screening. Some providers will use this same test as a disease monitoring tool if you already have osteoporosis. -Breast cancer screenings are recommended every other year for women of normal risk, age 54-69.  -Cervical cancer screenings for women over age 72 are only recommended with certain risk factors.      Here is a list of your current Health Maintenance items (your personalized list of preventive services) with a due date:  Health Maintenance Due   Topic Date Due    Smoker or Former Smoker - Annual [address]  Never done    Bone Mineral Density   Never done    Colorectal Screening  01/11/2021    Mammogram  12/18/2021

## 2022-10-25 NOTE — PROGRESS NOTES
Chief Complaint   Patient presents with    Medication Refill         Health Maintenance Due   Topic Date Due    Shingrix Vaccine Age 49> (1 of 2) Never done    Low dose CT lung screening  Never done    Bone Densitometry (Dexa) Screening  Never done    Colorectal Cancer Screening Combo  01/11/2021    Breast Cancer Screen Mammogram  12/18/2021    Medicare Yearly Exam  03/05/2022     1. \"Have you been to the ER, urgent care clinic since your last visit? Hospitalized since your last visit? \" No    2. \"Have you seen or consulted any other health care providers outside of the [address] [place] [address] since your last visit? \"  [facility], [facility], Dr. Verenice Hernandez for cloitus and infusion       3. For patients aged 39-70: Has the patient had a colonoscopy / FIT/ Cologuard? Yes - no Care Gap present      If the patient is female:    4. For patients aged 41-77: Has the patient had a mammogram within the past 2 years? Yes - no Care Gap present pt has another one scheduled for Janurary/Feburary 20223      5. For patients aged 21-65: Has the patient had a pap smear?  NA - based on age or sex

## 2022-10-26 LAB
25(OH)D3 SERPL-MCNC: 31.2 NG/ML (ref 30–100)
ALBUMIN SERPL-MCNC: 3.9 G/DL (ref 3.5–5)
ALBUMIN/GLOB SERPL: 1 {RATIO} (ref 1.1–2.2)
ALP SERPL-CCNC: 85 U/L (ref 45–117)
ALT SERPL-CCNC: 26 U/L (ref 12–78)
ANION GAP SERPL CALC-SCNC: 8 MMOL/L (ref 5–15)
AST SERPL-CCNC: 32 U/L (ref 15–37)
BILIRUB SERPL-MCNC: 0.5 MG/DL (ref 0.2–1)
BUN SERPL-MCNC: 13 MG/DL (ref 6–20)
BUN/CREAT SERPL: 25 (ref 12–20)
CALCIUM SERPL-MCNC: 9.2 MG/DL (ref 8.5–10.1)
CHLORIDE SERPL-SCNC: 104 MMOL/L (ref 97–108)
CHOLEST SERPL-MCNC: 203 MG/DL
CO2 SERPL-SCNC: 25 MMOL/L (ref 21–32)
CREAT SERPL-MCNC: 0.52 MG/DL (ref 0.55–1.02)
ERYTHROCYTE [DISTWIDTH] IN BLOOD BY AUTOMATED COUNT: 14.2 % (ref 11.5–14.5)
EST. AVERAGE GLUCOSE BLD GHB EST-MCNC: 108 MG/DL
GLOBULIN SER CALC-MCNC: 3.8 G/DL (ref 2–4)
GLUCOSE SERPL-MCNC: 87 MG/DL (ref 65–100)
HBA1C MFR BLD: 5.4 % (ref 4–5.6)
HCT VFR BLD AUTO: 41 % (ref 35–47)
HDLC SERPL-MCNC: 76 MG/DL
HDLC SERPL: 2.7 {RATIO} (ref 0–5)
HGB BLD-MCNC: 13.6 G/DL (ref 11.5–16)
LDLC SERPL CALC-MCNC: 106.2 MG/DL (ref 0–100)
MCH RBC QN AUTO: 36 PG (ref 26–34)
MCHC RBC AUTO-ENTMCNC: 33.2 G/DL (ref 30–36.5)
MCV RBC AUTO: 108.5 FL (ref 80–99)
NRBC # BLD: 0 K/UL (ref 0–0.01)
NRBC BLD-RTO: 0 PER 100 WBC
PLATELET # BLD AUTO: 173 K/UL (ref 150–400)
PMV BLD AUTO: 11.8 FL (ref 8.9–12.9)
POTASSIUM SERPL-SCNC: 4.2 MMOL/L (ref 3.5–5.1)
PROT SERPL-MCNC: 7.7 G/DL (ref 6.4–8.2)
RBC # BLD AUTO: 3.78 M/UL (ref 3.8–5.2)
SODIUM SERPL-SCNC: 137 MMOL/L (ref 136–145)
TRIGL SERPL-MCNC: 104 MG/DL (ref ?–150)
TSH SERPL DL<=0.05 MIU/L-ACNC: 0.92 UIU/ML (ref 0.36–3.74)
VLDLC SERPL CALC-MCNC: 20.8 MG/DL
WBC # BLD AUTO: 7.7 K/UL (ref 3.6–11)

## 2022-11-14 ENCOUNTER — HOSPITAL ENCOUNTER (OUTPATIENT)
Dept: CT IMAGING | Age: 67
Discharge: HOME | End: 2022-11-14
Attending: FAMILY MEDICINE
Payer: MEDICARE

## 2022-11-14 DIAGNOSIS — Z87.891 PERSONAL HISTORY OF TOBACCO USE, PRESENTING HAZARDS TO HEALTH: ICD-10-CM

## 2022-11-14 PROCEDURE — 71271 CT THORAX LUNG CANCER SCR C-: CPT

## 2022-11-20 DIAGNOSIS — G47.00 INSOMNIA, UNSPECIFIED TYPE: ICD-10-CM

## 2022-11-20 DIAGNOSIS — M79.7 FIBROMYALGIA: ICD-10-CM

## 2022-11-20 RX ORDER — TRAZODONE HYDROCHLORIDE 50 MG/1
TABLET ORAL
Qty: 45 TABLET | Refills: 0 | Status: SHIPPED | OUTPATIENT
Start: 2022-11-20

## 2023-01-05 LAB — MAMMOGRAPHY, EXTERNAL: NORMAL

## 2023-02-03 DIAGNOSIS — M79.7 FIBROMYALGIA: ICD-10-CM

## 2023-02-03 RX ORDER — PREGABALIN 75 MG/1
CAPSULE ORAL
Qty: 180 CAPSULE | Refills: 0 | Status: SHIPPED | OUTPATIENT
Start: 2023-02-03

## 2023-05-22 RX ORDER — METOPROLOL SUCCINATE 25 MG/1
TABLET, EXTENDED RELEASE ORAL
Qty: 180 TABLET | Refills: 1 | Status: SHIPPED | OUTPATIENT
Start: 2023-05-22

## 2023-07-11 ENCOUNTER — TELEPHONE (OUTPATIENT)
Age: 68
End: 2023-07-11

## 2023-07-11 DIAGNOSIS — R91.1 LUNG NODULE: Primary | ICD-10-CM

## 2023-07-11 NOTE — TELEPHONE ENCOUNTER
----- Message from Too Gregory sent at 7/11/2023  1:21 PM EDT -----  Subject: Referral Request    Reason for referral request? Pt needs an order for a CT and a PET of her   lungs to follow up with previous imaging results. Provider patient wants to be referred to(if known):     Provider Phone Number(if known):     Additional Information for Provider?   ---------------------------------------------------------------------------  --------------  [address] [place]    [phone]; OK to leave message on voicemail  ---------------------------------------------------------------------------  --------------
LDCT was ordered, pt will be called to schedule
with patient

## 2023-07-12 ENCOUNTER — TELEPHONE (OUTPATIENT)
Age: 68
End: 2023-07-12

## 2023-07-12 NOTE — TELEPHONE ENCOUNTER
Pt came in and spoke with . Gave new order for LDCT and was informed scheduling team should contact pt but also gave the scheduling team number. ----- Message from Cathie Johnson sent at 7/11/2023  4:01 PM EDT -----  Subject: Message to Provider    QUESTIONS  Information for Provider? pt is wanting to let the office know that she is   stopping by tomorrow to get a lung screening paper scanned as she is   unsure how to fax.   ---------------------------------------------------------------------------  --------------  [address] [place]  [phone]; OK to leave message on voicemail  ---------------------------------------------------------------------------  --------------  SCRIPT ANSWERS  Relationship to Patient?  Self

## 2023-07-24 ENCOUNTER — HOSPITAL ENCOUNTER (OUTPATIENT)
Facility: HOSPITAL | Age: 68
Discharge: HOME | End: 2023-07-27
Attending: FAMILY MEDICINE
Payer: MEDICARE

## 2023-07-24 DIAGNOSIS — R91.1 LUNG NODULE: ICD-10-CM

## 2023-07-24 PROCEDURE — 71250 CT THORAX DX C-: CPT

## 2023-08-08 ENCOUNTER — TELEPHONE (OUTPATIENT)
Age: 68
End: 2023-08-08

## 2023-09-20 ENCOUNTER — NURSE TRIAGE (OUTPATIENT)
Dept: OTHER | Facility: CLINIC | Age: 68
End: 2023-09-20

## 2023-09-20 NOTE — TELEPHONE ENCOUNTER
Location of patient: [address] [place] [address] [place] call from Jose Segovia at [facility] with [employer] Subjective: Caller states \"I am fatigued\"     Current Symptoms: Fatigue is worsening. SOB with walking. Has heart issues where the 2 top valves don't always work well. No chest pain at time of call. Loss of memory. Cannot lift her L shoulder up or back without pain, for the past 5 months. Onset: 1 year ago; worsening    Associated Symptoms: reduced activity    Pain Severity: 0/10    Temperature: denies fever     LMP: NA Pregnant: NA    Recommended disposition: Go to ED/UCC Now (Or to Office with PCP Approval)    Care advice provided, patient verbalizes understanding; denies any other questions or concerns; instructed to call back for any new or worsening symptoms. Triage RN tried to contact PCP office for second level triage and no staff picked up. Advised caller am sending message to PCP as a high priority call. Call sent to PCP as high priority. Attention Provider: Thank you for allowing me to participate in the care of your patient. The patient was connected to triage in response to information provided to the ECC/PSC. Please do not respond through this encounter as the response is not directed to a shared pool.     Reason for Disposition   Patient sounds very sick or weak to the triager    Protocols used: Weakness (Generalized) and Fatigue-ADULT-[place]

## 2023-10-13 RX ORDER — METOPROLOL SUCCINATE 25 MG/1
25 TABLET, EXTENDED RELEASE ORAL 2 TIMES DAILY
Qty: 60 TABLET | Refills: 0 | Status: SHIPPED | OUTPATIENT
Start: 2023-10-13 | End: 2023-12-07 | Stop reason: SDUPTHER

## 2023-10-13 RX ORDER — TRAZODONE HYDROCHLORIDE 50 MG/1
50 TABLET ORAL NIGHTLY
Qty: 90 TABLET | Refills: 3 | Status: SHIPPED | OUTPATIENT
Start: 2023-10-13

## 2023-10-13 RX ORDER — TIZANIDINE 4 MG/1
4 TABLET ORAL EVERY 8 HOURS PRN
Qty: 90 TABLET | Refills: 1 | Status: SHIPPED | OUTPATIENT
Start: 2023-10-13

## 2023-10-13 NOTE — TELEPHONE ENCOUNTER
Refill request (pt calling)    traZODone (DESYREL) 50 MG tablet    Pt stated she takes 1 whole tablet at night    tiZANidine (ZANAFLEX) 4 MG tablet        [facility] [address] [facility]    Pt stated she has stopped taking the Lyrica

## 2023-10-16 RX ORDER — TRAZODONE HYDROCHLORIDE 50 MG/1
50 TABLET ORAL NIGHTLY
Qty: 90 TABLET | Refills: 0 | Status: SHIPPED | OUTPATIENT
Start: 2023-10-16 | End: 2023-10-17 | Stop reason: SDUPTHER

## 2023-10-17 ENCOUNTER — OFFICE VISIT (OUTPATIENT)
Age: 68
End: 2023-10-17
Payer: MEDICARE

## 2023-10-17 VITALS
TEMPERATURE: 98.6 F | WEIGHT: 135 LBS | BODY MASS INDEX: 24.69 KG/M2 | DIASTOLIC BLOOD PRESSURE: 77 MMHG | HEART RATE: 79 BPM | SYSTOLIC BLOOD PRESSURE: 111 MMHG | OXYGEN SATURATION: 96 %

## 2023-10-17 DIAGNOSIS — R79.9 ABNORMAL FINDING OF BLOOD CHEMISTRY, UNSPECIFIED: ICD-10-CM

## 2023-10-17 DIAGNOSIS — E55.9 VITAMIN D DEFICIENCY: Primary | ICD-10-CM

## 2023-10-17 DIAGNOSIS — M79.10 MUSCLE PAIN: ICD-10-CM

## 2023-10-17 DIAGNOSIS — G47.9 DIFFICULTY SLEEPING: ICD-10-CM

## 2023-10-17 DIAGNOSIS — M25.512 ACUTE PAIN OF LEFT SHOULDER: ICD-10-CM

## 2023-10-17 DIAGNOSIS — K51.919 ULCERATIVE COLITIS WITH COMPLICATION, UNSPECIFIED LOCATION (HCC): ICD-10-CM

## 2023-10-17 DIAGNOSIS — R41.3 MEMORY CHANGE: ICD-10-CM

## 2023-10-17 PROCEDURE — G8427 DOCREV CUR MEDS BY ELIG CLIN: HCPCS | Performed by: FAMILY MEDICINE

## 2023-10-17 PROCEDURE — G8399 PT W/DXA RESULTS DOCUMENT: HCPCS | Performed by: FAMILY MEDICINE

## 2023-10-17 PROCEDURE — 99214 OFFICE O/P EST MOD 30 MIN: CPT | Performed by: FAMILY MEDICINE

## 2023-10-17 PROCEDURE — G8420 CALC BMI NORM PARAMETERS: HCPCS | Performed by: FAMILY MEDICINE

## 2023-10-17 PROCEDURE — G8484 FLU IMMUNIZE NO ADMIN: HCPCS | Performed by: FAMILY MEDICINE

## 2023-10-17 PROCEDURE — 1123F ACP DISCUSS/DSCN MKR DOCD: CPT | Performed by: FAMILY MEDICINE

## 2023-10-17 PROCEDURE — 1090F PRES/ABSN URINE INCON ASSESS: CPT | Performed by: FAMILY MEDICINE

## 2023-10-17 PROCEDURE — 3017F COLORECTAL CA SCREEN DOC REV: CPT | Performed by: FAMILY MEDICINE

## 2023-10-17 PROCEDURE — 4004F PT TOBACCO SCREEN RCVD TLK: CPT | Performed by: FAMILY MEDICINE

## 2023-10-17 RX ORDER — TRAZODONE HYDROCHLORIDE 50 MG/1
50 TABLET ORAL NIGHTLY
Qty: 90 TABLET | Refills: 0 | Status: SHIPPED | OUTPATIENT
Start: 2023-10-17

## 2023-10-17 RX ORDER — TIZANIDINE 4 MG/1
4 TABLET ORAL EVERY 8 HOURS PRN
Qty: 180 TABLET | Refills: 1 | Status: SHIPPED | OUTPATIENT
Start: 2023-10-17

## 2023-10-17 SDOH — ECONOMIC STABILITY: INCOME INSECURITY: HOW HARD IS IT FOR YOU TO PAY FOR THE VERY BASICS LIKE FOOD, HOUSING, MEDICAL CARE, AND HEATING?: NOT HARD AT ALL

## 2023-10-17 SDOH — ECONOMIC STABILITY: FOOD INSECURITY: WITHIN THE PAST 12 MONTHS, YOU WORRIED THAT YOUR FOOD WOULD RUN OUT BEFORE YOU GOT MONEY TO BUY MORE.: NEVER TRUE

## 2023-10-17 SDOH — ECONOMIC STABILITY: FOOD INSECURITY: WITHIN THE PAST 12 MONTHS, THE FOOD YOU BOUGHT JUST DIDN'T LAST AND YOU DIDN'T HAVE MONEY TO GET MORE.: NEVER TRUE

## 2023-10-17 SDOH — ECONOMIC STABILITY: HOUSING INSECURITY
IN THE LAST 12 MONTHS, WAS THERE A TIME WHEN YOU DID NOT HAVE A STEADY PLACE TO SLEEP OR SLEPT IN A SHELTER (INCLUDING NOW)?: NO

## 2023-10-17 ASSESSMENT — PATIENT HEALTH QUESTIONNAIRE - PHQ9
1. LITTLE INTEREST OR PLEASURE IN DOING THINGS: 0
SUM OF ALL RESPONSES TO PHQ QUESTIONS 1-9: 0
SUM OF ALL RESPONSES TO PHQ QUESTIONS 1-9: 0
2. FEELING DOWN, DEPRESSED OR HOPELESS: 0
SUM OF ALL RESPONSES TO PHQ QUESTIONS 1-9: 0
SUM OF ALL RESPONSES TO PHQ9 QUESTIONS 1 & 2: 0
SUM OF ALL RESPONSES TO PHQ QUESTIONS 1-9: 0

## 2023-10-24 ENCOUNTER — NURSE ONLY (OUTPATIENT)
Age: 68
End: 2023-10-24

## 2023-10-24 DIAGNOSIS — E55.9 VITAMIN D DEFICIENCY: ICD-10-CM

## 2023-10-24 DIAGNOSIS — R79.9 ABNORMAL FINDING OF BLOOD CHEMISTRY, UNSPECIFIED: ICD-10-CM

## 2023-10-25 LAB
25(OH)D3 SERPL-MCNC: 43.1 NG/ML (ref 30–100)
ALBUMIN SERPL-MCNC: 4 G/DL (ref 3.5–5)
ALBUMIN/GLOB SERPL: 1.4 (ref 1.1–2.2)
ALP SERPL-CCNC: 60 U/L (ref 45–117)
ALT SERPL-CCNC: 17 U/L (ref 12–78)
ANION GAP SERPL CALC-SCNC: 6 MMOL/L (ref 5–15)
AST SERPL-CCNC: 14 U/L (ref 15–37)
BASOPHILS # BLD: 0 K/UL (ref 0–0.1)
BASOPHILS NFR BLD: 1 % (ref 0–1)
BILIRUB SERPL-MCNC: 0.4 MG/DL (ref 0.2–1)
BUN SERPL-MCNC: 7 MG/DL (ref 6–20)
BUN/CREAT SERPL: 12 (ref 12–20)
CALCIUM SERPL-MCNC: 9.1 MG/DL (ref 8.5–10.1)
CHLORIDE SERPL-SCNC: 108 MMOL/L (ref 97–108)
CHOLEST SERPL-MCNC: 228 MG/DL
CO2 SERPL-SCNC: 28 MMOL/L (ref 21–32)
CREAT SERPL-MCNC: 0.57 MG/DL (ref 0.55–1.02)
DIFFERENTIAL METHOD BLD: NORMAL
EOSINOPHIL # BLD: 0.1 K/UL (ref 0–0.4)
EOSINOPHIL NFR BLD: 2 % (ref 0–7)
ERYTHROCYTE [DISTWIDTH] IN BLOOD BY AUTOMATED COUNT: 13.8 % (ref 11.5–14.5)
EST. AVERAGE GLUCOSE BLD GHB EST-MCNC: 111 MG/DL
GLOBULIN SER CALC-MCNC: 2.9 G/DL (ref 2–4)
GLUCOSE SERPL-MCNC: 96 MG/DL (ref 65–100)
HBA1C MFR BLD: 5.5 % (ref 4–5.6)
HCT VFR BLD AUTO: 38.6 % (ref 35–47)
HDLC SERPL-MCNC: 49 MG/DL
HDLC SERPL: 4.7 (ref 0–5)
HGB BLD-MCNC: 12.7 G/DL (ref 11.5–16)
IMM GRANULOCYTES # BLD AUTO: 0 K/UL (ref 0–0.04)
IMM GRANULOCYTES NFR BLD AUTO: 0 % (ref 0–0.5)
LDLC SERPL CALC-MCNC: 140 MG/DL (ref 0–100)
LYMPHOCYTES # BLD: 3.1 K/UL (ref 0.8–3.5)
LYMPHOCYTES NFR BLD: 47 % (ref 12–49)
MCH RBC QN AUTO: 31.1 PG (ref 26–34)
MCHC RBC AUTO-ENTMCNC: 32.9 G/DL (ref 30–36.5)
MCV RBC AUTO: 94.6 FL (ref 80–99)
MONOCYTES # BLD: 0.4 K/UL (ref 0–1)
MONOCYTES NFR BLD: 7 % (ref 5–13)
NEUTS SEG # BLD: 2.7 K/UL (ref 1.8–8)
NEUTS SEG NFR BLD: 43 % (ref 32–75)
NRBC # BLD: 0 K/UL (ref 0–0.01)
NRBC BLD-RTO: 0 PER 100 WBC
PLATELET # BLD AUTO: 174 K/UL (ref 150–400)
PMV BLD AUTO: 12.5 FL (ref 8.9–12.9)
POTASSIUM SERPL-SCNC: 4 MMOL/L (ref 3.5–5.1)
PROT SERPL-MCNC: 6.9 G/DL (ref 6.4–8.2)
RBC # BLD AUTO: 4.08 M/UL (ref 3.8–5.2)
SODIUM SERPL-SCNC: 142 MMOL/L (ref 136–145)
TRIGL SERPL-MCNC: 195 MG/DL
TSH SERPL DL<=0.05 MIU/L-ACNC: 2.54 UIU/ML (ref 0.36–3.74)
VLDLC SERPL CALC-MCNC: 39 MG/DL
WBC # BLD AUTO: 6.4 K/UL (ref 3.6–11)

## 2023-10-26 DIAGNOSIS — M25.552 PAIN IN LEFT HIP: ICD-10-CM

## 2023-10-26 RX ORDER — BACLOFEN 10 MG/1
10 TABLET ORAL 3 TIMES DAILY
Qty: 270 TABLET | Refills: 3 | Status: SHIPPED | OUTPATIENT
Start: 2023-10-26

## 2023-12-07 NOTE — TELEPHONE ENCOUNTER
We received a fax refill request for [facility]. Please escribe metoprolol succinate (TOPROL XL) 25 MG extended release tablet  to their pharmacy. The pharmacy is correct in the chart and they are requesting a 180 day supply.

## 2023-12-08 RX ORDER — METOPROLOL SUCCINATE 25 MG/1
25 TABLET, EXTENDED RELEASE ORAL 2 TIMES DAILY
Qty: 180 TABLET | Refills: 1 | Status: SHIPPED | OUTPATIENT
Start: 2023-12-08

## 2024-02-07 DIAGNOSIS — M79.10 MUSCLE PAIN: ICD-10-CM

## 2024-02-07 RX ORDER — TIZANIDINE 4 MG/1
4 TABLET ORAL EVERY 8 HOURS PRN
Qty: 270 TABLET | Refills: 1 | Status: SHIPPED | OUTPATIENT
Start: 2024-02-07

## 2024-05-31 RX ORDER — METOPROLOL SUCCINATE 25 MG/1
25 TABLET, EXTENDED RELEASE ORAL 2 TIMES DAILY
Qty: 180 TABLET | Refills: 1 | Status: SHIPPED | OUTPATIENT
Start: 2024-05-31

## 2024-07-22 ENCOUNTER — OFFICE VISIT (OUTPATIENT)
Age: 69
End: 2024-07-22
Payer: MEDICARE

## 2024-07-22 DIAGNOSIS — F10.11 ALCOHOL ABUSE, IN REMISSION: Primary | ICD-10-CM

## 2024-07-22 DIAGNOSIS — R41.3 MEMORY LOSS: ICD-10-CM

## 2024-07-22 DIAGNOSIS — R41.840 ATTENTION DEFICIT: ICD-10-CM

## 2024-07-22 DIAGNOSIS — F33.1 MAJOR DEPRESSIVE DISORDER, RECURRENT EPISODE, MODERATE WITH ANXIOUS DISTRESS (HCC): ICD-10-CM

## 2024-07-22 PROCEDURE — 4004F PT TOBACCO SCREEN RCVD TLK: CPT | Performed by: CLINICAL NEUROPSYCHOLOGIST

## 2024-07-22 PROCEDURE — 1123F ACP DISCUSS/DSCN MKR DOCD: CPT | Performed by: CLINICAL NEUROPSYCHOLOGIST

## 2024-07-22 PROCEDURE — 90791 PSYCH DIAGNOSTIC EVALUATION: CPT | Performed by: CLINICAL NEUROPSYCHOLOGIST

## 2024-07-22 NOTE — PROGRESS NOTES
Intake Note      Patient Name: Silvia Lion  YOB: 1955    Age: 69 y.o.  Date of Intake: 7/22/2024   Education: 12 Ethnicity White   Gender: Female Referring Provider: Dr. Nick     REASON FOR REFERRAL AND EVALUATION PROCEDURES:  Silvia Lion  was referred for evaluation by her Primary Care Physician to assist in differential diagnosis and individualized treatment planning. she understood the rationale and procedures for evaluation, as well as the limits to confidentiality, and agreed to participate. she consented to have this report made available to her  treating providers through her  electronic medical records.   History Sources: Patient, Spouse, and Medical Record    HISTORY OF PRESENT ILLNESS:  The patient is a 69-year-old female with pertinent medical history noted for fibromyalgia, breast cancer, anxiety, and vitamin D deficiency.  She presented for clinical interview accompanied by her  but she appeared to be an adequate historian.  According to the patient and her , the patient experienced the insidious onset of changes in her cognitive functioning approximately 3 or 4 years ago.  These changes reportedly include attention deficits (e.g., losing her train of thought and becoming easily distracted) and memory problems (e.g., difficulty learning new names and phone numbers).  The patient also stated she occasionally has difficulties recalling names of her family members; however, her  has not noticed or been concerned about this issue.    The patient reported she initially attributed her cognitive difficulties to her alcohol consumption but stated that even after she stopped drinking approximately 18 months ago, her cognitive difficulties have continued to worsen over time.  The patient stated she had been consuming excessive volumes of alcohol for approximately 20 years and her highest level of consumption occurred for the last 3 years.  She estimated she used to

## 2024-07-30 ENCOUNTER — PROCEDURE VISIT (OUTPATIENT)
Age: 69
End: 2024-07-30
Payer: MEDICARE

## 2024-07-30 DIAGNOSIS — F33.41 RECURRENT MAJOR DEPRESSIVE DISORDER, IN PARTIAL REMISSION (HCC): ICD-10-CM

## 2024-07-30 DIAGNOSIS — F10.11 ALCOHOL ABUSE, IN REMISSION: Primary | ICD-10-CM

## 2024-07-30 DIAGNOSIS — F41.1 GENERALIZED ANXIETY DISORDER: ICD-10-CM

## 2024-07-30 PROCEDURE — 96139 PSYCL/NRPSYC TST TECH EA: CPT | Performed by: CLINICAL NEUROPSYCHOLOGIST

## 2024-07-30 PROCEDURE — 96138 PSYCL/NRPSYC TECH 1ST: CPT | Performed by: CLINICAL NEUROPSYCHOLOGIST

## 2024-07-30 PROCEDURE — 96132 NRPSYC TST EVAL PHYS/QHP 1ST: CPT | Performed by: CLINICAL NEUROPSYCHOLOGIST

## 2024-07-30 PROCEDURE — 96133 NRPSYC TST EVAL PHYS/QHP EA: CPT | Performed by: CLINICAL NEUROPSYCHOLOGIST

## 2024-08-27 NOTE — PROGRESS NOTES
Neuropsychological Evaluation Report      Patient Name: Silvia Lion  YOB: 1955    Age: 69 y.o.  Date of Intake: 2024   Education: 12 Date of Testin2024   Gender: Female Ethnicity: White     Referring Provider: Dr. Nick     REASON FOR REFERRAL AND EVALUATION PROCEDURES:  Silvia Lion  was referred for neuropsychological evaluation by her Primary Care Physician to obtain a quantitative assessment of her current level of neurocognitive functioning, assist in differential diagnosis, and aid in individualized treatment planning. The patient understood the rationale and procedures for evaluation, as well as the limits to confidentiality, and they agreed to participate. The patient consented to have this report made available to her  treating providers through her  electronic medical records. This evaluation was completed with the patient by Noé Kimbrough PsyD with the exception of testing by technician, which was completed by MARKO Sullivan under the supervision of Dr. Kimbrough.  History Sources: Patient, Spouse, Medical Record, and Test Data    SUMMARY AND IMPRESSION:  The following section is a summary of the patient's pertinent test results and impressions. A more thorough review of the patient's background and test scores can be found below.    As noted below, results from this evaluation should be interpreted with caution due to evidence of fluctuating task engagement.  At face value, the patient's neuropsychological evaluation revealed a significant weakness on one test of visuospatial learning and recall; however, verbal learning/recall and recognition discrimination for all memory testing were within normal limits.  Sporadic/inconsistent weaknesses were also shown on tests of mental processing speed, executive functioning, and visuospatial construction.  However, at the broader level, no pattern of domain level impairment was revealed in these areas.  Brief attention, sustained

## 2024-08-29 ENCOUNTER — OFFICE VISIT (OUTPATIENT)
Age: 69
End: 2024-08-29
Payer: MEDICARE

## 2024-08-29 DIAGNOSIS — F10.11 ALCOHOL ABUSE, IN REMISSION: Primary | ICD-10-CM

## 2024-08-29 DIAGNOSIS — F33.41 RECURRENT MAJOR DEPRESSIVE DISORDER, IN PARTIAL REMISSION (HCC): ICD-10-CM

## 2024-08-29 DIAGNOSIS — F41.1 GENERALIZED ANXIETY DISORDER: ICD-10-CM

## 2024-08-29 PROCEDURE — 96132 NRPSYC TST EVAL PHYS/QHP 1ST: CPT | Performed by: CLINICAL NEUROPSYCHOLOGIST

## 2024-08-29 RX ORDER — METOPROLOL SUCCINATE 25 MG/1
25 TABLET, EXTENDED RELEASE ORAL 2 TIMES DAILY
Qty: 180 TABLET | Refills: 0 | Status: SHIPPED | OUTPATIENT
Start: 2024-08-29

## 2024-08-29 NOTE — PROGRESS NOTES
Chief complaint: Patient presented for review of previously completed neuropsychological evaluation and discussion of impressions/recommendations.    Prior to seeing the patient for today's visit, I reviewed pertinent records, including the previously completed report, the records in Epic, and any updated visits from other providers since the patient's last visit.    I provided feedback services related to the previously completed report. Attendees included: Patient and Spouse. Education was provided regarding my diagnostic impressions, and we discussed treatment plan/options. Attendees were provided with the opportunity to ask questions, which were answered to the best of my ability.    We discussed, in detail, the following:  Reviewed findings from evaluation including test results, diagnosis, and suspected contributing factors  Discussed recommendations outlined in report  Answered questions to the best of my ability    The patient needs to:   Follow up with referring provider for ongoing management, Initiate psychotherapy using resources (See handout), and Emphasize modifiable risk and protective factors for cognitive functioning (e.g., exercise, diet, cognitive stimulation; see handout)    The patient had the following reactions to recommendations: Patient and  reported understanding results and recommendations.  They indicated they were relieved by findings.  Patient stated she was interested in exercising more frequently and continuing to engage in cognitively stimulating activities.    The patient had the following concerns which I deferred to their referring provider:   meds for anxiety  ASSESSMENT:  Generalized anxiety disorder  Major depressive disorder in partial remission  Alcohol abuse in remission    TIME SPENT PROVIDING SERVICES:   31 minutes     BILLING:  96132 x 1 Units    *Code 32510 Neuropsychological testing evaluation services include: Integration of patient data, interpretation of

## 2024-09-13 ENCOUNTER — TELEPHONE (OUTPATIENT)
Age: 69
End: 2024-09-13

## 2024-09-13 DIAGNOSIS — Z72.0 TOBACCO USE: Primary | ICD-10-CM

## 2024-09-23 DIAGNOSIS — G47.9 DIFFICULTY SLEEPING: ICD-10-CM

## 2024-09-23 RX ORDER — TRAZODONE HYDROCHLORIDE 50 MG/1
50 TABLET, FILM COATED ORAL NIGHTLY
Qty: 90 TABLET | Refills: 0 | OUTPATIENT
Start: 2024-09-23

## 2024-09-24 ENCOUNTER — HOSPITAL ENCOUNTER (OUTPATIENT)
Facility: HOSPITAL | Age: 69
Discharge: HOME | End: 2024-09-27
Attending: FAMILY MEDICINE
Payer: MEDICARE

## 2024-09-24 DIAGNOSIS — Z72.0 TOBACCO USE: ICD-10-CM

## 2024-09-24 PROCEDURE — 71271 CT THORAX LUNG CANCER SCR C-: CPT

## 2024-09-30 ENCOUNTER — CLINICAL DOCUMENTATION (OUTPATIENT)
Age: 69
End: 2024-09-30

## 2024-09-30 ENCOUNTER — OFFICE VISIT (OUTPATIENT)
Age: 69
End: 2024-09-30
Payer: MEDICARE

## 2024-09-30 VITALS
BODY MASS INDEX: 20.72 KG/M2 | HEART RATE: 71 BPM | TEMPERATURE: 98.1 F | OXYGEN SATURATION: 94 % | HEIGHT: 62 IN | RESPIRATION RATE: 16 BRPM | SYSTOLIC BLOOD PRESSURE: 128 MMHG | WEIGHT: 112.6 LBS | DIASTOLIC BLOOD PRESSURE: 73 MMHG

## 2024-09-30 DIAGNOSIS — Z01.811 PRE-OP CHEST EXAM: ICD-10-CM

## 2024-09-30 DIAGNOSIS — E53.8 B12 DEFICIENCY: ICD-10-CM

## 2024-09-30 DIAGNOSIS — E61.1 IRON DEFICIENCY: ICD-10-CM

## 2024-09-30 DIAGNOSIS — I48.91 ATRIAL FIBRILLATION, UNSPECIFIED TYPE (HCC): ICD-10-CM

## 2024-09-30 DIAGNOSIS — Z00.00 ROUTINE GENERAL MEDICAL EXAMINATION AT A HEALTH CARE FACILITY: Primary | ICD-10-CM

## 2024-09-30 DIAGNOSIS — E78.5 HYPERLIPIDEMIA, UNSPECIFIED HYPERLIPIDEMIA TYPE: ICD-10-CM

## 2024-09-30 DIAGNOSIS — F41.1 GAD (GENERALIZED ANXIETY DISORDER): ICD-10-CM

## 2024-09-30 DIAGNOSIS — E55.9 VITAMIN D DEFICIENCY: ICD-10-CM

## 2024-09-30 DIAGNOSIS — G47.9 DIFFICULTY SLEEPING: ICD-10-CM

## 2024-09-30 DIAGNOSIS — K51.919 ULCERATIVE COLITIS WITH COMPLICATION, UNSPECIFIED LOCATION (HCC): ICD-10-CM

## 2024-09-30 PROCEDURE — 99214 OFFICE O/P EST MOD 30 MIN: CPT | Performed by: FAMILY MEDICINE

## 2024-09-30 PROCEDURE — G0439 PPPS, SUBSEQ VISIT: HCPCS | Performed by: FAMILY MEDICINE

## 2024-09-30 PROCEDURE — G8427 DOCREV CUR MEDS BY ELIG CLIN: HCPCS | Performed by: FAMILY MEDICINE

## 2024-09-30 PROCEDURE — 3017F COLORECTAL CA SCREEN DOC REV: CPT | Performed by: FAMILY MEDICINE

## 2024-09-30 PROCEDURE — 1090F PRES/ABSN URINE INCON ASSESS: CPT | Performed by: FAMILY MEDICINE

## 2024-09-30 PROCEDURE — G8420 CALC BMI NORM PARAMETERS: HCPCS | Performed by: FAMILY MEDICINE

## 2024-09-30 PROCEDURE — G8399 PT W/DXA RESULTS DOCUMENT: HCPCS | Performed by: FAMILY MEDICINE

## 2024-09-30 PROCEDURE — 4004F PT TOBACCO SCREEN RCVD TLK: CPT | Performed by: FAMILY MEDICINE

## 2024-09-30 PROCEDURE — 1123F ACP DISCUSS/DSCN MKR DOCD: CPT | Performed by: FAMILY MEDICINE

## 2024-09-30 RX ORDER — TRAZODONE HYDROCHLORIDE 50 MG/1
50 TABLET, FILM COATED ORAL NIGHTLY
Qty: 90 TABLET | Refills: 0 | Status: SHIPPED | OUTPATIENT
Start: 2024-09-30

## 2024-09-30 NOTE — PROGRESS NOTES
Chief Complaint   Patient presents with    Pre-op Exam         Health Maintenance Due   Topic Date Due    Respiratory Syncytial Virus (RSV) Pregnant or age 60 yrs+ (1 - 1-dose 60+ series) Never done    Pneumococcal 65+ years Vaccine (2 of 2 - PCV) 12/29/2018    Annual Wellness Visit (Medicare)  11/27/2023    Breast cancer screen  01/05/2024    Flu vaccine (1) 08/01/2024    COVID-19 Vaccine (7 - 2023-24 season) 09/01/2024    Depression Monitoring  10/17/2024         \"Have you been to the ER, urgent care clinic since your last visit?  Hospitalized since your last visit?\"    NO    “Have you seen or consulted any other health care providers outside of [facility] since your last visit?”    YES - When: approximately Yes ago.  Where and Why: Ophthalmology.       Have you had a mammogram?”   YES - Where: [facility].  Pt authorize record release Nurse/CMA to request most recent records if not in the chart    Date of last Mammogram: 1/5/2023         
TAKE 1 TABLET BY MOUTH TWICE A  tablet 0    tiZANidine (ZANAFLEX) 4 MG tablet TAKE 1 TABLET BY MOUTH EVERY 8 HOURS AS NEEDED (PAIN). 270 tablet 1    baclofen (LIORESAL) 10 MG tablet TAKE 1 TABLET BY MOUTH THREE TIMES A  tablet 3    acetaminophen (TYLENOL) 500 MG tablet Take by mouth every 6 hours as needed      Cholecalciferol 50 MCG (2000 UT) CAPS Take by mouth daily      inFLIXimab (REMICADE) 100 MG injection Remicade 100 mg intravenous solution   Inject by intravenous route.      Melatonin 5 MG CAPS Take by mouth       No current facility-administered medications for this visit.     Allergies   Allergen Reactions    Bupropion     Ciprofloxacin Other (See Comments)    Gabapentin     Mesalamine Other (See Comments)    Naproxen      Other reaction(s): Unknown (comments)  Bleeding in stomach    Nsaids Other (See Comments)    Hydromorphone Anxiety     Family History   Problem Relation Age of Onset    Heart Disease Mother     Cancer Cousin     Cancer Maternal Grandmother     Cancer Maternal Aunt     Breast Cancer Paternal Aunt     Heart Attack Maternal Grandfather     Heart Surgery Mother     Coronary Art Dis Maternal Grandfather     Liver Disease Father     Other Sister         neuroma    Cancer Sister     Other Other         muscular dystrophy     Social History     Tobacco Use    Smoking status: Every Day     Current packs/day: 1.00     Average packs/day: 1 pack/day for 50.0 years (50.0 ttl pk-yrs)     Types: Cigarettes     Passive exposure: Current    Smokeless tobacco: Never   Substance Use Topics    Alcohol use: Not Currently     Alcohol/week: 2.5 standard drinks of alcohol     Types: 3 Standard drinks or equivalent per week     Patient Active Problem List   Diagnosis    Fibromyalgia    Lower extremity edema    Venous insufficiency of both lower extremities    Neoplasm of right breast, 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

## 2024-10-01 LAB
25(OH)D3 SERPL-MCNC: 36.2 NG/ML (ref 30–100)
ALBUMIN SERPL-MCNC: 4 G/DL (ref 3.5–5)
ALBUMIN/GLOB SERPL: 1.3 (ref 1.1–2.2)
ALP SERPL-CCNC: 82 U/L (ref 45–117)
ALT SERPL-CCNC: 16 U/L (ref 12–78)
ANION GAP SERPL CALC-SCNC: 3 MMOL/L (ref 2–12)
AST SERPL-CCNC: 18 U/L (ref 15–37)
BASOPHILS # BLD: 0.1 K/UL (ref 0–0.1)
BASOPHILS NFR BLD: 1 % (ref 0–1)
BILIRUB SERPL-MCNC: 0.4 MG/DL (ref 0.2–1)
BUN SERPL-MCNC: 10 MG/DL (ref 6–20)
BUN/CREAT SERPL: 16 (ref 12–20)
CALCIUM SERPL-MCNC: 9.4 MG/DL (ref 8.5–10.1)
CHLORIDE SERPL-SCNC: 109 MMOL/L (ref 97–108)
CHOLEST SERPL-MCNC: 219 MG/DL
CO2 SERPL-SCNC: 29 MMOL/L (ref 21–32)
CREAT SERPL-MCNC: 0.61 MG/DL (ref 0.55–1.02)
DIFFERENTIAL METHOD BLD: NORMAL
EOSINOPHIL # BLD: 0.1 K/UL (ref 0–0.4)
EOSINOPHIL NFR BLD: 1 % (ref 0–7)
ERYTHROCYTE [DISTWIDTH] IN BLOOD BY AUTOMATED COUNT: 13.8 % (ref 11.5–14.5)
FERRITIN SERPL-MCNC: 99 NG/ML (ref 26–388)
FOLATE SERPL-MCNC: 18.7 NG/ML (ref 5–21)
GLOBULIN SER CALC-MCNC: 3.2 G/DL (ref 2–4)
GLUCOSE SERPL-MCNC: 66 MG/DL (ref 65–100)
HCT VFR BLD AUTO: 39.5 % (ref 35–47)
HDLC SERPL-MCNC: 66 MG/DL
HDLC SERPL: 3.3 (ref 0–5)
HGB BLD-MCNC: 12.8 G/DL (ref 11.5–16)
IMM GRANULOCYTES # BLD AUTO: 0 K/UL (ref 0–0.04)
IMM GRANULOCYTES NFR BLD AUTO: 0 % (ref 0–0.5)
IRON SATN MFR SERPL: 26 % (ref 20–50)
IRON SERPL-MCNC: 78 UG/DL (ref 35–150)
LDLC SERPL CALC-MCNC: 125.8 MG/DL (ref 0–100)
LYMPHOCYTES # BLD: 2.7 K/UL (ref 0.8–3.5)
LYMPHOCYTES NFR BLD: 30 % (ref 12–49)
MCH RBC QN AUTO: 31.1 PG (ref 26–34)
MCHC RBC AUTO-ENTMCNC: 32.4 G/DL (ref 30–36.5)
MCV RBC AUTO: 96.1 FL (ref 80–99)
MONOCYTES # BLD: 0.6 K/UL (ref 0–1)
MONOCYTES NFR BLD: 6 % (ref 5–13)
NEUTS SEG # BLD: 5.7 K/UL (ref 1.8–8)
NEUTS SEG NFR BLD: 62 % (ref 32–75)
NRBC # BLD: 0 K/UL (ref 0–0.01)
NRBC BLD-RTO: 0 PER 100 WBC
PLATELET # BLD AUTO: 180 K/UL (ref 150–400)
PMV BLD AUTO: 12.4 FL (ref 8.9–12.9)
POTASSIUM SERPL-SCNC: 3.7 MMOL/L (ref 3.5–5.1)
PROT SERPL-MCNC: 7.2 G/DL (ref 6.4–8.2)
RBC # BLD AUTO: 4.11 M/UL (ref 3.8–5.2)
SODIUM SERPL-SCNC: 141 MMOL/L (ref 136–145)
TIBC SERPL-MCNC: 297 UG/DL (ref 250–450)
TRIGL SERPL-MCNC: 136 MG/DL
TSH SERPL DL<=0.05 MIU/L-ACNC: 1.48 UIU/ML (ref 0.36–3.74)
VIT B12 SERPL-MCNC: 642 PG/ML (ref 193–986)
VLDLC SERPL CALC-MCNC: 27.2 MG/DL
WBC # BLD AUTO: 9.1 K/UL (ref 3.6–11)

## 2024-10-01 RX ORDER — PRAVASTATIN SODIUM 40 MG
40 TABLET ORAL DAILY
Qty: 90 TABLET | Refills: 3 | Status: SHIPPED | OUTPATIENT
Start: 2024-10-01

## 2024-10-01 NOTE — TELEPHONE ENCOUNTER
Labs reviewed.      Cholesterol is on the higher side.  Between cholesterol and other risk factors I estimate risk of heart disease in the next 10 years at around 13%.  This is higher than we like to see.  Recommend cholesterol medicine to reduce this risk    Pravastatin pended to encounter if interested

## 2024-10-01 NOTE — TELEPHONE ENCOUNTER
verified. Informed pt of message from provider regarding lab results. Pt verified understanding and agreed to starting cholesterol medication. Verified pharmacy. Pt had no further questions.

## 2024-10-10 DIAGNOSIS — M25.552 PAIN IN LEFT HIP: ICD-10-CM

## 2024-10-10 RX ORDER — BACLOFEN 10 MG/1
10 TABLET ORAL 3 TIMES DAILY
Qty: 270 TABLET | Refills: 3 | Status: SHIPPED | OUTPATIENT
Start: 2024-10-10

## 2024-12-16 ENCOUNTER — APPOINTMENT (OUTPATIENT)
Facility: HOSPITAL | Age: 69
End: 2024-12-16
Payer: MEDICARE

## 2024-12-16 ENCOUNTER — HOSPITAL ENCOUNTER (EMERGENCY)
Facility: HOSPITAL | Age: 69
Discharge: HOME | End: 2024-12-16
Payer: MEDICARE

## 2024-12-16 VITALS
WEIGHT: 112.43 LBS | TEMPERATURE: 97.9 F | HEIGHT: 62 IN | RESPIRATION RATE: 16 BRPM | HEART RATE: 63 BPM | OXYGEN SATURATION: 98 % | BODY MASS INDEX: 20.69 KG/M2 | DIASTOLIC BLOOD PRESSURE: 55 MMHG | SYSTOLIC BLOOD PRESSURE: 143 MMHG

## 2024-12-16 DIAGNOSIS — S39.012A BACK STRAIN, INITIAL ENCOUNTER: Primary | ICD-10-CM

## 2024-12-16 LAB
ALBUMIN SERPL-MCNC: 4 G/DL (ref 3.5–5)
ALBUMIN/GLOB SERPL: 1.2 (ref 1.1–2.2)
ALP SERPL-CCNC: 70 U/L (ref 45–117)
ALT SERPL-CCNC: 17 U/L (ref 12–78)
ANION GAP SERPL CALC-SCNC: 1 MMOL/L (ref 2–12)
APPEARANCE UR: CLEAR
AST SERPL-CCNC: 17 U/L (ref 15–37)
BACTERIA URNS QL MICRO: NEGATIVE /HPF
BASOPHILS # BLD: 0 K/UL (ref 0–0.1)
BASOPHILS NFR BLD: 1 % (ref 0–1)
BILIRUB SERPL-MCNC: 0.3 MG/DL (ref 0.2–1)
BILIRUB UR QL: NEGATIVE
BUN SERPL-MCNC: 15 MG/DL (ref 6–20)
BUN/CREAT SERPL: 27 (ref 12–20)
CALCIUM SERPL-MCNC: 9.3 MG/DL (ref 8.5–10.1)
CHLORIDE SERPL-SCNC: 107 MMOL/L (ref 97–108)
CO2 SERPL-SCNC: 30 MMOL/L (ref 21–32)
COLOR UR: ABNORMAL
CREAT SERPL-MCNC: 0.55 MG/DL (ref 0.55–1.02)
DIFFERENTIAL METHOD BLD: NORMAL
EOSINOPHIL # BLD: 0.1 K/UL (ref 0–0.4)
EOSINOPHIL NFR BLD: 1 % (ref 0–7)
EPITH CASTS URNS QL MICRO: ABNORMAL /LPF
ERYTHROCYTE [DISTWIDTH] IN BLOOD BY AUTOMATED COUNT: 13.6 % (ref 11.5–14.5)
GLOBULIN SER CALC-MCNC: 3.4 G/DL (ref 2–4)
GLUCOSE SERPL-MCNC: 85 MG/DL (ref 65–100)
GLUCOSE UR STRIP.AUTO-MCNC: NEGATIVE MG/DL
HCT VFR BLD AUTO: 39.5 % (ref 35–47)
HGB BLD-MCNC: 13.1 G/DL (ref 11.5–16)
HGB UR QL STRIP: ABNORMAL
HYALINE CASTS URNS QL MICRO: ABNORMAL /LPF (ref 0–2)
IMM GRANULOCYTES # BLD AUTO: 0 K/UL (ref 0–0.04)
IMM GRANULOCYTES NFR BLD AUTO: 0 % (ref 0–0.5)
KETONES UR QL STRIP.AUTO: NEGATIVE MG/DL
LEUKOCYTE ESTERASE UR QL STRIP.AUTO: ABNORMAL
LYMPHOCYTES # BLD: 2.9 K/UL (ref 0.8–3.5)
LYMPHOCYTES NFR BLD: 35 % (ref 12–49)
MCH RBC QN AUTO: 31.9 PG (ref 26–34)
MCHC RBC AUTO-ENTMCNC: 33.2 G/DL (ref 30–36.5)
MCV RBC AUTO: 96.1 FL (ref 80–99)
MONOCYTES # BLD: 0.6 K/UL (ref 0–1)
MONOCYTES NFR BLD: 7 % (ref 5–13)
NEUTS SEG # BLD: 4.7 K/UL (ref 1.8–8)
NEUTS SEG NFR BLD: 56 % (ref 32–75)
NITRITE UR QL STRIP.AUTO: NEGATIVE
NRBC # BLD: 0 K/UL (ref 0–0.01)
NRBC BLD-RTO: 0 PER 100 WBC
PH UR STRIP: 7 (ref 5–8)
PLATELET # BLD AUTO: 180 K/UL (ref 150–400)
PMV BLD AUTO: 11.1 FL (ref 8.9–12.9)
POTASSIUM SERPL-SCNC: 4 MMOL/L (ref 3.5–5.1)
PROT SERPL-MCNC: 7.4 G/DL (ref 6.4–8.2)
PROT UR STRIP-MCNC: NEGATIVE MG/DL
RBC # BLD AUTO: 4.11 M/UL (ref 3.8–5.2)
RBC #/AREA URNS HPF: ABNORMAL /HPF (ref 0–5)
SODIUM SERPL-SCNC: 138 MMOL/L (ref 136–145)
SP GR UR REFRACTOMETRY: 1.01
URINE CULTURE IF INDICATED: ABNORMAL
UROBILINOGEN UR QL STRIP.AUTO: 1 EU/DL (ref 0.2–1)
WBC # BLD AUTO: 8.3 K/UL (ref 3.6–11)
WBC URNS QL MICRO: ABNORMAL /HPF (ref 0–4)

## 2024-12-16 PROCEDURE — 6370000000 HC RX 637 (ALT 250 FOR IP)

## 2024-12-16 PROCEDURE — 74176 CT ABD & PELVIS W/O CONTRAST: CPT

## 2024-12-16 PROCEDURE — 99284 EMERGENCY DEPT VISIT MOD MDM: CPT

## 2024-12-16 PROCEDURE — 85025 COMPLETE CBC W/AUTO DIFF WBC: CPT

## 2024-12-16 PROCEDURE — 36415 COLL VENOUS BLD VENIPUNCTURE: CPT

## 2024-12-16 PROCEDURE — 80053 COMPREHEN METABOLIC PANEL: CPT

## 2024-12-16 PROCEDURE — 81001 URINALYSIS AUTO W/SCOPE: CPT

## 2024-12-16 RX ORDER — DIAZEPAM 5 MG/1
5 TABLET ORAL ONCE
Status: COMPLETED | OUTPATIENT
Start: 2024-12-16 | End: 2024-12-16

## 2024-12-16 RX ORDER — OXYCODONE HYDROCHLORIDE 5 MG/1
5 TABLET ORAL EVERY 6 HOURS PRN
Qty: 10 TABLET | Refills: 0 | Status: SHIPPED | OUTPATIENT
Start: 2024-12-16 | End: 2024-12-19

## 2024-12-16 RX ORDER — LIDOCAINE 4 G/G
1 PATCH TOPICAL
Status: DISCONTINUED | OUTPATIENT
Start: 2024-12-16 | End: 2024-12-16 | Stop reason: HOSPADM

## 2024-12-16 RX ADMIN — DIAZEPAM 5 MG: 5 TABLET ORAL at 19:24

## 2024-12-16 ASSESSMENT — PAIN - FUNCTIONAL ASSESSMENT: PAIN_FUNCTIONAL_ASSESSMENT: 0-10

## 2024-12-16 ASSESSMENT — PAIN SCALES - GENERAL: PAINLEVEL_OUTOF10: 3

## 2024-12-16 ASSESSMENT — PAIN DESCRIPTION - ORIENTATION: ORIENTATION: LEFT

## 2024-12-16 ASSESSMENT — PAIN DESCRIPTION - LOCATION: LOCATION: FLANK

## 2024-12-16 ASSESSMENT — PAIN DESCRIPTION - DESCRIPTORS: DESCRIPTORS: ACHING

## 2024-12-16 ASSESSMENT — PAIN DESCRIPTION - PAIN TYPE: TYPE: ACUTE PAIN

## 2024-12-17 NOTE — ED NOTES
Patient stable GCS15  Vitally stable not in any form of distress   Medications given   IV cannula removed, discharged summary given and understood

## 2024-12-17 NOTE — ED PROVIDER NOTES
[facility] EMERGENCY DEPT  EMERGENCY DEPARTMENT ENCOUNTER    Patient Name: Silvia Lion  MRN: [id number]  YOB: 1955  Provider: Bert Henriquez MD  PCP: Ezequiel Millan MD  ***  Time/Date of evaluation: 8:12 PM EST on 12/16/24    History of Presenting Illness     Chief Complaint   Patient presents with    Back Pain     Pt c/o worsening Back pain x yesterday. Pt states she has been having intermittent L flank pain x one week. Pt denies hx of kidney stones. Pt denies dysuria . Pt denies N/V/D     History Provided by: {History Source:55888::\"Patient\"}   History is limited by: {History Limitations:74679::\"Nothing\"}    HISTORY (Narrative):   Silvia Lion is a 69 y.o. female ***      Nursing Notes were all reviewed and agreed with or any disagreements were addressed in the HPI.    Past History     PAST MEDICAL HISTORY:  Past Medical History:   Diagnosis Date    Atrial fibrillation (HCC)     Breast cancer (HCC) 2002    right breast    Family history of skin cancer     Sister - melanoma on shoulder - removed    Gastrointestinal disorder     proctitis    Murmur     Other ill-defined conditions(799.89)     pleurisy    Other ill-defined conditions(799.89)     fibromyalgia    Other ill-defined conditions(799.89)     hereniated disc neck & lower spine    Sun-damaged skin     Sunburn, blistering     Tanning bed exposure        PAST SURGICAL HISTORY:  Past Surgical History:   Procedure Laterality Date    BREAST LUMPECTOMY Right 2002    cancer    BREAST SURGERY      breast cancer    GYN  1973,1981,1988    c sections    HYSTERECTOMY (CERVIX STATUS UNKNOWN)      ORTHOPEDIC SURGERY      left foot sx    OTHER SURGICAL HISTORY         FAMILY HISTORY:  Family History   Problem Relation Age of Onset    Heart Disease Mother     Cancer Cousin     Cancer Maternal Grandmother     Cancer Maternal Aunt     Breast Cancer Paternal Aunt     Heart Attack Maternal Grandfather     Heart Surgery Mother     Coronary Art Dis Maternal

## 2024-12-26 DIAGNOSIS — G47.9 DIFFICULTY SLEEPING: ICD-10-CM

## 2024-12-26 RX ORDER — TRAZODONE HYDROCHLORIDE 50 MG/1
50 TABLET, FILM COATED ORAL NIGHTLY
Qty: 90 TABLET | Refills: 3 | Status: SHIPPED | OUTPATIENT
Start: 2024-12-26

## 2025-02-17 RX ORDER — METOPROLOL SUCCINATE 25 MG/1
25 TABLET, EXTENDED RELEASE ORAL 2 TIMES DAILY
Qty: 180 TABLET | Refills: 3 | Status: SHIPPED | OUTPATIENT
Start: 2025-02-17

## 2025-05-11 DIAGNOSIS — M79.10 MUSCLE PAIN: ICD-10-CM

## 2025-08-14 RX ORDER — PRAVASTATIN SODIUM 40 MG
40 TABLET ORAL DAILY
Qty: 90 TABLET | Refills: 0 | Status: SHIPPED | OUTPATIENT
Start: 2025-08-14

## 2025-08-18 ENCOUNTER — TELEMEDICINE (OUTPATIENT)
Age: 70
End: 2025-08-18
Payer: MEDICARE

## 2025-08-18 DIAGNOSIS — U07.1 COVID-19: Primary | ICD-10-CM

## 2025-08-18 PROCEDURE — 99213 OFFICE O/P EST LOW 20 MIN: CPT
